# Patient Record
Sex: FEMALE | Race: WHITE | Employment: FULL TIME | URBAN - METROPOLITAN AREA
[De-identification: names, ages, dates, MRNs, and addresses within clinical notes are randomized per-mention and may not be internally consistent; named-entity substitution may affect disease eponyms.]

---

## 2017-01-23 ENCOUNTER — OFFICE VISIT (OUTPATIENT)
Dept: FAMILY MEDICINE CLINIC | Facility: CLINIC | Age: 47
End: 2017-01-23

## 2017-01-23 VITALS
HEIGHT: 64 IN | BODY MASS INDEX: 24.82 KG/M2 | DIASTOLIC BLOOD PRESSURE: 68 MMHG | SYSTOLIC BLOOD PRESSURE: 110 MMHG | WEIGHT: 145.38 LBS | HEART RATE: 88 BPM

## 2017-01-23 DIAGNOSIS — M72.2 PLANTAR FASCIITIS OF LEFT FOOT: Primary | ICD-10-CM

## 2017-01-23 PROCEDURE — 99213 OFFICE O/P EST LOW 20 MIN: CPT | Performed by: FAMILY MEDICINE

## 2017-01-23 NOTE — PATIENT INSTRUCTIONS
Understanding Plantar Fasciitis    Plantar fasciitis is a condition that causes foot and heel pain. The plantar fascia is a tough band of tissue that runs across the bottom of the foot from the heel to the toes. This tissue pulls on the heel bone.  It sup · Using heel cups or foot inserts (orthotics). These are placed in the shoes to help support the heel or arch and cushion the heel. You may also be told to buy proper-fitting shoes with good arch support and cushioned soles. · Taping the foot.  This suppor This can happen gradually or suddenly. It usually affects one foot at a time. Heel pain can be sharp, like a knife sticking into the bottom of your foot.  You may feel pain after exercising, long-distance jogging, stair climbing, long periods of standing, o · Icing may help control heel pain. Apply an ice pack to the heel for 10-20 minutes as a preventive. Or ice your heel after a severe flare-up of symptoms. You may repeat this every 1-2 hours as needed.   · You may use over-the-counter pain medicine to contr · To reduce severe pain and swelling, your healthcare provider may prescribe pills or injections or a walking cast in some instances. Physical therapy, such as ultrasound or a daily stretching program, may also be recommended. Surgery is rarely required.

## 2017-01-23 NOTE — PROGRESS NOTES
Britni Mckeon is a 48year old female. HPI:       Left heel pain. Started in the right, which got better with stretches. At worst is in the morning, up to 7-8/10. Also worse at the end of the day.   Worse after sitting for a while and getting up to wal 70% hearing loss in left ear   • History of HPV infection 6/2/2015   • Breast cancer ([facility] [address] 5/2014     Invasive ductal CA          Past Surgical History    TUBAL LIGATION  10-    Comment [facility] [place]    OTHER SURGICAL HISTORY oriented to person, place, and time. No sensory deficit. Skin: Skin is warm and dry. No lesion and no rash noted. No erythema. Psychiatric: She has a normal mood and affect.  Her behavior is normal.       ASSESSMENT AND PLAN:     Plantar fasciitis of le

## 2017-01-24 ENCOUNTER — HOSPITAL ENCOUNTER (OUTPATIENT)
Dept: GENERAL RADIOLOGY | Facility: HOSPITAL | Age: 47
Discharge: HOME | End: 2017-01-24
Attending: FAMILY MEDICINE
Payer: COMMERCIAL

## 2017-01-24 DIAGNOSIS — M72.2 PLANTAR FASCIITIS OF LEFT FOOT: ICD-10-CM

## 2017-01-24 PROCEDURE — 73630 X-RAY EXAM OF FOOT: CPT

## 2017-03-06 ENCOUNTER — OFFICE VISIT (OUTPATIENT)
Dept: FAMILY MEDICINE CLINIC | Facility: CLINIC | Age: 47
End: 2017-03-06

## 2017-03-06 VITALS
HEIGHT: 64 IN | BODY MASS INDEX: 24.69 KG/M2 | HEART RATE: 84 BPM | WEIGHT: 144.63 LBS | DIASTOLIC BLOOD PRESSURE: 64 MMHG | SYSTOLIC BLOOD PRESSURE: 108 MMHG

## 2017-03-06 DIAGNOSIS — Z00.00 ROUTINE GENERAL MEDICAL EXAMINATION AT A HEALTH CARE FACILITY: Primary | ICD-10-CM

## 2017-03-06 DIAGNOSIS — N92.6 IRREGULAR MENSTRUAL BLEEDING: ICD-10-CM

## 2017-03-06 DIAGNOSIS — Z00.00 LABORATORY EXAM ORDERED AS PART OF ROUTINE GENERAL MEDICAL EXAMINATION: ICD-10-CM

## 2017-03-06 DIAGNOSIS — Z11.1 SCREENING FOR TUBERCULOSIS: ICD-10-CM

## 2017-03-06 PROCEDURE — 99396 PREV VISIT EST AGE 40-64: CPT | Performed by: FAMILY MEDICINE

## 2017-03-06 PROCEDURE — 86580 TB INTRADERMAL TEST: CPT | Performed by: FAMILY MEDICINE

## 2017-03-06 NOTE — PROGRESS NOTES
HPI:   Brian Bautista is a 48year old female who presents for a complete physical exam.   Symptoms: denies discharge, itching, burning or dysuria, irregular, pt on tamoxifen.    Last PAP: 2015  Abnormal PAP: 2014 HPV positive  Sexually active: yes   Last child birth   • Normal delivery 40-     child birth   • Visual impairment      reading glasses   • Hearing impairment      70% hearing loss in left ear   • History of HPV infection 6/2/2015   • [facility] 5[facility]2014     Invasive ductal CA chest pain or tightness on exertion: no edema  VASCULAR: denies leg cramps  GI: denies abdominal pain, bowel movement changes, blood in stool  : denies urinary problems, vaginal discharge or discomfort,  periods regular no  MUSCULOSKELETAL: denies joint Routine health profile labs pending. Healthy diet and regular exercise discussed and encouraged. Pt to return to clinic right after her period for PAP ONLY. 2. Laboratory exam ordered as part of routine general medical examination  - LIPID PANEL;  Futu

## 2017-03-06 NOTE — PATIENT INSTRUCTIONS
SCHEDULING [facility] LAB APPOINTMENTS ONLINE    Lab appointments can now be scheduled online at [email or web address]    · Go to [email or web address]  · In Search type Lab  · Click \"Lab services\"  · Click \"Schedule Your Test Online\"  · Follow the prompts often you should have a mammogram.   Pap test: at least every 3 years if you have ever had sex and have not had your uterus removed.  Your healthcare provider may recommend more frequent screening if you have had any abnormalities in the past or if you have smoke or do not get regular exercise). Osteoporosis is a disease that thins and weakens bones to the point where they break easily. Hearing test: if you are 72 or older   Vision test: if you are 72 or older.    Remember, these are the minimum recommendati a hepatitis shot and for all adults who are at risk of infection.  This includes, for example, women who have more than 1 sex partner or whose partner has more than 1 partner, or who have a sexually transmitted infection, abuse IV drugs, or plan to travel w partners. Hormone use: During or after menopause, discuss the risks and benefits of use of estrogen and progesterone replacement with your healthcare provider.    Osteoporosis prevention:  Advise 1,500 mg of calcium with 1,000 iu of vitamin D daily and da

## 2017-03-08 ENCOUNTER — NURSE ONLY (OUTPATIENT)
Dept: FAMILY MEDICINE CLINIC | Facility: CLINIC | Age: 47
End: 2017-03-08

## 2017-03-08 LAB — INDURATION (): 0 MM (ref 0–11)

## 2017-05-08 ENCOUNTER — OFFICE VISIT (OUTPATIENT)
Dept: HEMATOLOGY/ONCOLOGY | Age: 47
End: 2017-05-08
Attending: INTERNAL MEDICINE
Payer: COMMERCIAL

## 2017-05-08 VITALS
RESPIRATION RATE: 18 BRPM | SYSTOLIC BLOOD PRESSURE: 108 MMHG | OXYGEN SATURATION: 97 % | BODY MASS INDEX: 26 KG/M2 | HEART RATE: 75 BPM | WEIGHT: 150 LBS | DIASTOLIC BLOOD PRESSURE: 70 MMHG | TEMPERATURE: 99 F

## 2017-05-08 DIAGNOSIS — R74.8 ELEVATED LIVER ENZYMES: ICD-10-CM

## 2017-05-08 DIAGNOSIS — N92.1 MENORRHAGIA WITH IRREGULAR CYCLE: ICD-10-CM

## 2017-05-08 DIAGNOSIS — C50.412 PRIMARY MALIGNANT NEOPLASM OF UPPER OUTER QUADRANT OF LEFT FEMALE BREAST (HCC): Primary | ICD-10-CM

## 2017-05-08 PROCEDURE — 99213 OFFICE O/P EST LOW 20 MIN: CPT | Performed by: INTERNAL MEDICINE

## 2017-05-08 RX ORDER — ALPRAZOLAM 1 MG/1
1 TABLET ORAL 2 TIMES DAILY PRN
Qty: 60 TABLET | Refills: 6 | Status: SHIPPED | OUTPATIENT
Start: 2017-05-08 | End: 2017-11-13

## 2017-05-08 NOTE — PROGRESS NOTES
[facility] Progress Note      Patient Name:  Lynn Berry  YOB: 1970  Medical Record Number:  [id number]    Date of visit:  5/8/2017    CHIEF COMPLAINT:  Stage I  L breast carcinoma s/p lumpectomy.     HPI:      48year old pre- Prescriptions:  ALPRAZolam (XANAX) 1 MG Oral Tab Take 1 tablet (1 mg total) by mouth 2 (two) times daily as needed for Sleep. Disp: 60 tablet Rfl: 6   Venlafaxine HCl ER 75 MG Oral Capsule SR 24 Hr Take 1 capsule (75 mg total) by mouth once daily.  Disp: 30 healed lumpectomy scars. No axillary adenopathy was noted. Emotional well being: Patient's emotional well being was assessed. No issues requiring acute psychosocial intervention were identified.      LABS:     No results found for this or any previous

## 2017-05-08 NOTE — PROGRESS NOTES
Pt here for MD f/u visit for h/o breast ca. Continues on Tamoxifen daily. Does report having hot flashes. Denies joint stiffness/pain.      Education Record    Learner:  Patient    Disease / Diagnosis:breast ca    Barriers / Limitations:  None   Comments:

## 2017-06-19 ENCOUNTER — LAB ENCOUNTER (OUTPATIENT)
Dept: LAB | Age: 47
End: 2017-06-19
Attending: FAMILY MEDICINE
Payer: COMMERCIAL

## 2017-06-19 DIAGNOSIS — Z00.00 LABORATORY EXAM ORDERED AS PART OF ROUTINE GENERAL MEDICAL EXAMINATION: ICD-10-CM

## 2017-06-19 DIAGNOSIS — C50.412 PRIMARY MALIGNANT NEOPLASM OF UPPER OUTER QUADRANT OF LEFT FEMALE BREAST (HCC): ICD-10-CM

## 2017-06-19 DIAGNOSIS — R74.8 ELEVATED LIVER ENZYMES: ICD-10-CM

## 2017-06-19 DIAGNOSIS — N92.1 MENORRHAGIA WITH IRREGULAR CYCLE: ICD-10-CM

## 2017-06-19 PROCEDURE — 82306 VITAMIN D 25 HYDROXY: CPT | Performed by: FAMILY MEDICINE

## 2017-06-19 PROCEDURE — 36415 COLL VENOUS BLD VENIPUNCTURE: CPT | Performed by: FAMILY MEDICINE

## 2017-06-19 PROCEDURE — 84439 ASSAY OF FREE THYROXINE: CPT | Performed by: FAMILY MEDICINE

## 2017-06-19 PROCEDURE — 84443 ASSAY THYROID STIM HORMONE: CPT | Performed by: FAMILY MEDICINE

## 2017-06-19 PROCEDURE — 80061 LIPID PANEL: CPT | Performed by: FAMILY MEDICINE

## 2017-06-22 ENCOUNTER — TELEPHONE (OUTPATIENT)
Dept: HEMATOLOGY/ONCOLOGY | Facility: HOSPITAL | Age: 47
End: 2017-06-22

## 2017-06-22 NOTE — TELEPHONE ENCOUNTER
\"Call, labs ok except liver enzyme bit high-fatuma low fat diet and recheck in 3 month-ordered. Hb bit low-no intervention needed. is she still having irregular spotting/menses? \"    Called to patient. Will try low fat diet and recheck in 3 months.   Per pa

## 2017-09-08 ENCOUNTER — HOSPITAL ENCOUNTER (EMERGENCY)
Age: 47
Discharge: HOME | End: 2017-09-08
Attending: EMERGENCY MEDICINE
Payer: COMMERCIAL

## 2017-09-08 ENCOUNTER — APPOINTMENT (OUTPATIENT)
Dept: GENERAL RADIOLOGY | Age: 47
End: 2017-09-08
Attending: EMERGENCY MEDICINE
Payer: COMMERCIAL

## 2017-09-08 ENCOUNTER — OFFICE VISIT (OUTPATIENT)
Dept: FAMILY MEDICINE CLINIC | Facility: CLINIC | Age: 47
End: 2017-09-08

## 2017-09-08 VITALS
OXYGEN SATURATION: 98 % | SYSTOLIC BLOOD PRESSURE: 126 MMHG | RESPIRATION RATE: 16 BRPM | WEIGHT: 148 LBS | HEART RATE: 95 BPM | BODY MASS INDEX: 25 KG/M2 | TEMPERATURE: 99 F | DIASTOLIC BLOOD PRESSURE: 82 MMHG

## 2017-09-08 VITALS
OXYGEN SATURATION: 99 % | SYSTOLIC BLOOD PRESSURE: 127 MMHG | WEIGHT: 148 LBS | HEART RATE: 99 BPM | TEMPERATURE: 99 F | BODY MASS INDEX: 27.23 KG/M2 | HEIGHT: 62 IN | RESPIRATION RATE: 18 BRPM | DIASTOLIC BLOOD PRESSURE: 84 MMHG

## 2017-09-08 DIAGNOSIS — Z02.9 ENCOUNTERS FOR ADMINISTRATIVE PURPOSES: Primary | ICD-10-CM

## 2017-09-08 DIAGNOSIS — R07.89 CHEST PAIN, ATYPICAL: Primary | ICD-10-CM

## 2017-09-08 LAB
ALBUMIN SERPL-MCNC: 3.7 G/DL (ref 3.5–4.8)
ALP LIVER SERPL-CCNC: 68 U/L (ref 39–100)
ALT SERPL-CCNC: 35 U/L (ref 14–54)
APTT PPP: 23.4 SECONDS (ref 25–34)
AST SERPL-CCNC: 24 U/L (ref 15–41)
BASOPHILS # BLD AUTO: 0.04 X10(3) UL (ref 0–0.1)
BASOPHILS NFR BLD AUTO: 0.3 %
BILIRUB SERPL-MCNC: 0.4 MG/DL (ref 0.1–2)
BILIRUB UR QL STRIP.AUTO: NEGATIVE
BUN BLD-MCNC: 14 MG/DL (ref 8–20)
CALCIUM BLD-MCNC: 9.1 MG/DL (ref 8.3–10.3)
CHLORIDE: 102 MMOL/L (ref 101–111)
CLARITY UR REFRACT.AUTO: CLEAR
CO2: 26 MMOL/L (ref 22–32)
COLOR UR AUTO: YELLOW
CREAT BLD-MCNC: 0.8 MG/DL (ref 0.55–1.02)
D-DIMER: 0.36 UG/ML FEU (ref 0–0.49)
EOSINOPHIL # BLD AUTO: 0.02 X10(3) UL (ref 0–0.3)
EOSINOPHIL NFR BLD AUTO: 0.1 %
ERYTHROCYTE [DISTWIDTH] IN BLOOD BY AUTOMATED COUNT: 11.7 % (ref 11.5–16)
GLUCOSE BLD-MCNC: 86 MG/DL (ref 70–99)
GLUCOSE UR STRIP.AUTO-MCNC: NEGATIVE MG/DL
HCT VFR BLD AUTO: 37.3 % (ref 34–50)
HGB BLD-MCNC: 13.1 G/DL (ref 12–16)
IMMATURE GRANULOCYTE COUNT: 0.07 X10(3) UL (ref 0–1)
IMMATURE GRANULOCYTE RATIO %: 0.5 %
INR BLD: 0.92 (ref 0.89–1.12)
KETONES UR STRIP.AUTO-MCNC: NEGATIVE MG/DL
LEUKOCYTE ESTERASE UR QL STRIP.AUTO: NEGATIVE
LYMPHOCYTES # BLD AUTO: 1.58 X10(3) UL (ref 0.9–4)
LYMPHOCYTES NFR BLD AUTO: 10.6 %
M PROTEIN MFR SERPL ELPH: 7.4 G/DL (ref 6.1–8.3)
MCH RBC QN AUTO: 31.3 PG (ref 27–33.2)
MCHC RBC AUTO-ENTMCNC: 35.1 G/DL (ref 31–37)
MCV RBC AUTO: 89.2 FL (ref 81–100)
MONOCYTES # BLD AUTO: 0.85 X10(3) UL (ref 0.1–0.6)
MONOCYTES NFR BLD AUTO: 5.7 %
NEUTROPHIL ABS PRELIM: 12.41 X10 (3) UL (ref 1.3–6.7)
NEUTROPHILS # BLD AUTO: 12.41 X10(3) UL (ref 1.3–6.7)
NEUTROPHILS NFR BLD AUTO: 82.8 %
NITRITE UR QL STRIP.AUTO: NEGATIVE
PH UR STRIP.AUTO: 6 [PH] (ref 4.5–8)
PLATELET # BLD AUTO: 275 10(3)UL (ref 150–450)
POTASSIUM SERPL-SCNC: 3.6 MMOL/L (ref 3.6–5.1)
PROT UR STRIP.AUTO-MCNC: NEGATIVE MG/DL
PSA SERPL DL<=0.01 NG/ML-MCNC: 12 SECONDS (ref 11.8–14.1)
RBC # BLD AUTO: 4.18 X10(6)UL (ref 3.8–5.1)
RBC UR QL AUTO: NEGATIVE
RED CELL DISTRIBUTION WIDTH-SD: 37.4 FL (ref 35.1–46.3)
SODIUM SERPL-SCNC: 135 MMOL/L (ref 136–144)
SP GR UR STRIP.AUTO: 1.01 (ref 1–1.03)
TROPONIN: <0.046 NG/ML (ref ?–0.05)
TROPONIN: <0.046 NG/ML (ref ?–0.05)
UROBILINOGEN UR STRIP.AUTO-MCNC: 0.2 MG/DL
WBC # BLD AUTO: 15 X10(3) UL (ref 4–13)

## 2017-09-08 PROCEDURE — 85730 THROMBOPLASTIN TIME PARTIAL: CPT | Performed by: EMERGENCY MEDICINE

## 2017-09-08 PROCEDURE — 85378 FIBRIN DEGRADE SEMIQUANT: CPT | Performed by: EMERGENCY MEDICINE

## 2017-09-08 PROCEDURE — 93005 ELECTROCARDIOGRAM TRACING: CPT

## 2017-09-08 PROCEDURE — 71100 X-RAY EXAM RIBS UNI 2 VIEWS: CPT | Performed by: EMERGENCY MEDICINE

## 2017-09-08 PROCEDURE — 36415 COLL VENOUS BLD VENIPUNCTURE: CPT

## 2017-09-08 PROCEDURE — 85025 COMPLETE CBC W/AUTO DIFF WBC: CPT | Performed by: EMERGENCY MEDICINE

## 2017-09-08 PROCEDURE — 80053 COMPREHEN METABOLIC PANEL: CPT | Performed by: EMERGENCY MEDICINE

## 2017-09-08 PROCEDURE — 85610 PROTHROMBIN TIME: CPT | Performed by: EMERGENCY MEDICINE

## 2017-09-08 PROCEDURE — 71020 XR CHEST PA + LAT CHEST (CPT=71020): CPT | Performed by: EMERGENCY MEDICINE

## 2017-09-08 PROCEDURE — 99285 EMERGENCY DEPT VISIT HI MDM: CPT

## 2017-09-08 PROCEDURE — 81003 URINALYSIS AUTO W/O SCOPE: CPT | Performed by: EMERGENCY MEDICINE

## 2017-09-08 PROCEDURE — 84484 ASSAY OF TROPONIN QUANT: CPT | Performed by: EMERGENCY MEDICINE

## 2017-09-08 RX ORDER — ASPIRIN 81 MG/1
324 TABLET, CHEWABLE ORAL ONCE
Status: COMPLETED | OUTPATIENT
Start: 2017-09-08 | End: 2017-09-08

## 2017-09-08 NOTE — ED INITIAL ASSESSMENT (HPI)
Pt has had chest pain a couple times lasting 4-5 hours for the past couple days. Pt states she is having pressure pain. Pt states she woke up at night w pain. Pt states the pain is mid chest epigastric region.  Pt has taken tums and relieved the pain on e t

## 2017-09-08 NOTE — PROGRESS NOTES
S:    Patient reports substernal chest pain described as squeezing. Pain started last night. Last night and this morning, pain rated 10/10. Currently, pain rated 4-5/10. Patient has had 3 previous episodes, but never sought medical care.    No SOB, dizz

## 2017-09-08 NOTE — ED PROVIDER NOTES
Patient Seen in: [address] [facility] In [place]    History   Patient presents with:  Chest Pain Angina (cardiovascular)    Stated Complaint: chest pain    HPI    Patient is a 41-year-old female who presents emergency room with a history of sev History   Problem Relation Age of Onset   • Cancer Paternal Grandmother      breast   • Breast Cancer Paternal Grandmother 72   • Diabetes Maternal Grandmother    • Breast Cancer Maternal Grandmother 72     estimated age   • Glaucoma Mother    • Breast Can appreciated. There is normoactive bowel sounds. There is no hernia. EXTREMITIES: There is no cyanosis, clubbing, or edema appreciated. Pulses are 2+ and equal in all 4 extremities. NEURO: Patient is awake, alert and oriented to time place and person.  Mot presentation. PROTHROMBIN TIME (PT) - Normal   TROPONIN I - Normal   CBC WITH DIFFERENTIAL WITH PLATELET    Narrative: The following orders were created for panel order CBC WITH DIFFERENTIAL WITH PLATELET.   Procedure                               Abn cardiac monitor.   I discussed with the patient at this point that she needed further testing and suggested admission to the hospital for further blood tests and a stress echocardiogram.  The patient is declining admission to the hospital and is aware of th

## 2017-09-09 LAB
ATRIAL RATE: 100 BPM
P AXIS: 43 DEGREES
P-R INTERVAL: 124 MS
Q-T INTERVAL: 310 MS
QRS DURATION: 80 MS
QTC CALCULATION (BEZET): 399 MS
R AXIS: 31 DEGREES
T AXIS: -33 DEGREES
VENTRICULAR RATE: 100 BPM

## 2017-10-11 RX ORDER — TAMOXIFEN CITRATE 20 MG/1
TABLET ORAL
Qty: 30 TABLET | Refills: 8 | Status: SHIPPED | OUTPATIENT
Start: 2017-10-11 | End: 2018-05-26

## 2017-11-06 ENCOUNTER — APPOINTMENT (OUTPATIENT)
Dept: HEMATOLOGY/ONCOLOGY | Age: 47
End: 2017-11-06
Payer: COMMERCIAL

## 2017-11-11 DIAGNOSIS — R74.8 ELEVATED LIVER ENZYMES: ICD-10-CM

## 2017-11-11 DIAGNOSIS — C50.412 PRIMARY MALIGNANT NEOPLASM OF UPPER OUTER QUADRANT OF LEFT FEMALE BREAST (HCC): ICD-10-CM

## 2017-11-11 DIAGNOSIS — N92.1 MENORRHAGIA WITH IRREGULAR CYCLE: ICD-10-CM

## 2017-11-13 ENCOUNTER — OFFICE VISIT (OUTPATIENT)
Dept: HEMATOLOGY/ONCOLOGY | Age: 47
End: 2017-11-13
Attending: INTERNAL MEDICINE
Payer: COMMERCIAL

## 2017-11-13 VITALS
DIASTOLIC BLOOD PRESSURE: 72 MMHG | WEIGHT: 155.63 LBS | BODY MASS INDEX: 28 KG/M2 | HEART RATE: 90 BPM | RESPIRATION RATE: 18 BRPM | SYSTOLIC BLOOD PRESSURE: 111 MMHG | TEMPERATURE: 98 F | OXYGEN SATURATION: 98 %

## 2017-11-13 DIAGNOSIS — C50.412 PRIMARY MALIGNANT NEOPLASM OF UPPER OUTER QUADRANT OF LEFT FEMALE BREAST (HCC): Primary | ICD-10-CM

## 2017-11-13 DIAGNOSIS — N92.1 MENORRHAGIA WITH IRREGULAR CYCLE: ICD-10-CM

## 2017-11-13 DIAGNOSIS — R74.8 ELEVATED LIVER ENZYMES: ICD-10-CM

## 2017-11-13 PROCEDURE — 99213 OFFICE O/P EST LOW 20 MIN: CPT | Performed by: INTERNAL MEDICINE

## 2017-11-13 RX ORDER — VENLAFAXINE HYDROCHLORIDE 75 MG/1
CAPSULE, EXTENDED RELEASE ORAL
Qty: 30 CAPSULE | Refills: 8 | Status: SHIPPED | OUTPATIENT
Start: 2017-11-13 | End: 2018-02-28

## 2017-11-13 RX ORDER — ALPRAZOLAM 1 MG/1
1 TABLET ORAL 2 TIMES DAILY PRN
Qty: 60 TABLET | Refills: 6 | Status: SHIPPED | OUTPATIENT
Start: 2017-11-13 | End: 2018-05-14

## 2017-11-13 NOTE — PROGRESS NOTES
Pt here for 6mth MD f/u visit for h/o breast ca. Continues on Tamoxifen daily. Does report having hot flashes.  Denies joint stiffness/pain.      Education Record     Learner:  Patient     Disease / Alfredo preston     Barriers / Limitations:  None

## 2017-11-13 NOTE — PROGRESS NOTES
[facility] Progress Note      Patient Name:  German Mcclure  YOB: 1970  Medical Record Number:  [id number]    Date of visit:  11/13/2017    CHIEF COMPLAINT:  Stage I  L breast carcinoma s/p lumpectomy.     HPI:      50year old pr Heme: no easy bruising or bleeding     ALLERGIES:    Tegaderm Chg Dressi*    Rash    Comment:Reacts to adhesive    MEDICATIONS:      Current Outpatient Prescriptions:  VENLAFAXINE HCL ER 75 MG Oral Capsule SR 24 Hr TAKE 1 CAPSULE BY MOUTH DAILY Disp: 30 hepato-splenomegaly. Extremities:  No edema. CNS: no focal deficit  Breast: both breasts were soft, without any masses or nipple retraction, with well healed lumpectomy scars. No axillary adenopathy was noted.       Emotional well being: Patient's emotion

## 2017-12-20 ENCOUNTER — HOSPITAL ENCOUNTER (OUTPATIENT)
Dept: MAMMOGRAPHY | Age: 47
Discharge: HOME | End: 2017-12-20
Attending: INTERNAL MEDICINE
Payer: COMMERCIAL

## 2017-12-20 DIAGNOSIS — C50.412 PRIMARY MALIGNANT NEOPLASM OF UPPER OUTER QUADRANT OF LEFT FEMALE BREAST (HCC): ICD-10-CM

## 2017-12-20 PROCEDURE — 77062 BREAST TOMOSYNTHESIS BI: CPT | Performed by: INTERNAL MEDICINE

## 2017-12-20 PROCEDURE — 77066 DX MAMMO INCL CAD BI: CPT | Performed by: INTERNAL MEDICINE

## 2018-02-12 ENCOUNTER — OFFICE VISIT (OUTPATIENT)
Dept: FAMILY MEDICINE CLINIC | Facility: CLINIC | Age: 48
End: 2018-02-12

## 2018-02-12 VITALS
WEIGHT: 163.63 LBS | HEIGHT: 64 IN | DIASTOLIC BLOOD PRESSURE: 72 MMHG | HEART RATE: 80 BPM | BODY MASS INDEX: 27.93 KG/M2 | RESPIRATION RATE: 16 BRPM | SYSTOLIC BLOOD PRESSURE: 104 MMHG

## 2018-02-12 DIAGNOSIS — E66.3 OVERWEIGHT (BMI 25.0-29.9): ICD-10-CM

## 2018-02-12 DIAGNOSIS — K75.81 NASH (NONALCOHOLIC STEATOHEPATITIS): ICD-10-CM

## 2018-02-12 DIAGNOSIS — Z87.19 HISTORY OF CHOLELITHIASIS: ICD-10-CM

## 2018-02-12 DIAGNOSIS — R10.11 RIGHT UPPER QUADRANT ABDOMINAL PAIN: ICD-10-CM

## 2018-02-12 DIAGNOSIS — R07.89 OTHER CHEST PAIN: Primary | ICD-10-CM

## 2018-02-12 PROCEDURE — 99214 OFFICE O/P EST MOD 30 MIN: CPT | Performed by: FAMILY MEDICINE

## 2018-02-12 NOTE — PROGRESS NOTES
Bill Centeno is a 50year old female. HPI:       Since July 2017 has been having episodes of right upper quadrant or right lower chest pain.   One episode so severe it woke her out of her sleep with squeezing pain of the very upper abdomen/lower chest. (OMEGA 3-6-9 COMPLEX) Oral Cap Take 2 capsules by mouth daily. Disp:  Rfl:    B Complex Vitamins (B COMPLEX OR) Take  by mouth. Disp:  Rfl:    Cyanocobalamin (VITAMIN B-12) 5000 MCG Sublingual SL Tab Place 1 tablet under the tongue daily.  Disp:  Rfl:    Co LEFT  43-: TUBAL LIGATION      Comment: Karishma Peres   Social History:  Smoking status: Former Smoker                                                              Packs/day: 1.00      Years: 15.00        Quit date: 3/1/2003  Smok oriented to person, place, and time. Psychiatric: She has a normal mood and affect. Her behavior is normal.         DATA:    Interpretation   PROCEDURE:  COMPLETE ULTRASOUND OF THE ABDOMEN WITHOUT DOPPLER     COMPARISON:  None.      INDICATIONS:  R74.8 Ab (CPT=76700); Future  - LIPASE; Future    4. SOTO (nonalcoholic steatohepatitis)  Slow steady weight loss recommended. Check labs. - US ABDOMEN COMPLETE (CPT=76700); Future  - COMP METABOLIC PANEL (14); Future    5. Overweight (BMI 25.0-29. 9)  Slow stea

## 2018-02-12 NOTE — PATIENT INSTRUCTIONS
We will call you with the test results and the next step.         Possible Gallstone with Biliary Colic (Presumed)    Your abdominal pain is may be due to spasm of the gallbladder. This is called gallbladder or biliary colic. The gallbladder is a smal and may cause increased pain. Therefore, avoid fat in your diet over the next two days and follow a low-fat diet after that. If you are overweight, a low fat diet will help you lose weight.   Follow-up care  If a test was already scheduled for you, keep thi

## 2018-02-19 ENCOUNTER — LAB ENCOUNTER (OUTPATIENT)
Dept: LAB | Age: 48
End: 2018-02-19
Attending: FAMILY MEDICINE
Payer: COMMERCIAL

## 2018-02-19 DIAGNOSIS — Z87.19 HISTORY OF CHOLELITHIASIS: ICD-10-CM

## 2018-02-19 DIAGNOSIS — R10.11 RIGHT UPPER QUADRANT ABDOMINAL PAIN: ICD-10-CM

## 2018-02-19 DIAGNOSIS — K75.81 NASH (NONALCOHOLIC STEATOHEPATITIS): ICD-10-CM

## 2018-02-19 LAB
ALBUMIN SERPL-MCNC: 3.5 G/DL (ref 3.5–4.8)
ALP LIVER SERPL-CCNC: 66 U/L (ref 39–100)
ALT SERPL-CCNC: 34 U/L (ref 14–54)
AST SERPL-CCNC: 22 U/L (ref 15–41)
BASOPHILS # BLD AUTO: 0.05 X10(3) UL (ref 0–0.1)
BASOPHILS NFR BLD AUTO: 0.9 %
BILIRUB SERPL-MCNC: 0.4 MG/DL (ref 0.1–2)
BUN BLD-MCNC: 18 MG/DL (ref 8–20)
CALCIUM BLD-MCNC: 8.8 MG/DL (ref 8.3–10.3)
CHLORIDE: 111 MMOL/L (ref 101–111)
CO2: 23 MMOL/L (ref 22–32)
CREAT BLD-MCNC: 0.94 MG/DL (ref 0.55–1.02)
EOSINOPHIL # BLD AUTO: 0.18 X10(3) UL (ref 0–0.3)
EOSINOPHIL NFR BLD AUTO: 3.3 %
ERYTHROCYTE [DISTWIDTH] IN BLOOD BY AUTOMATED COUNT: 12.8 % (ref 11.5–16)
GLUCOSE BLD-MCNC: 91 MG/DL (ref 70–99)
HCT VFR BLD AUTO: 35.4 % (ref 34–50)
HGB BLD-MCNC: 12.1 G/DL (ref 12–16)
IMMATURE GRANULOCYTE COUNT: 0.03 X10(3) UL (ref 0–1)
IMMATURE GRANULOCYTE RATIO %: 0.6 %
LIPASE: 219 U/L (ref 73–393)
LYMPHOCYTES # BLD AUTO: 1.52 X10(3) UL (ref 0.9–4)
LYMPHOCYTES NFR BLD AUTO: 28.1 %
M PROTEIN MFR SERPL ELPH: 6.9 G/DL (ref 6.1–8.3)
MCH RBC QN AUTO: 30.8 PG (ref 27–33.2)
MCHC RBC AUTO-ENTMCNC: 34.2 G/DL (ref 31–37)
MCV RBC AUTO: 90.1 FL (ref 81–100)
MONOCYTES # BLD AUTO: 0.54 X10(3) UL (ref 0.1–1)
MONOCYTES NFR BLD AUTO: 10 %
NEUTROPHIL ABS PRELIM: 3.09 X10 (3) UL (ref 1.3–6.7)
NEUTROPHILS # BLD AUTO: 3.09 X10(3) UL (ref 1.3–6.7)
NEUTROPHILS NFR BLD AUTO: 57.1 %
PLATELET # BLD AUTO: 275 10(3)UL (ref 150–450)
POTASSIUM SERPL-SCNC: 4.3 MMOL/L (ref 3.6–5.1)
RBC # BLD AUTO: 3.93 X10(6)UL (ref 3.8–5.1)
RED CELL DISTRIBUTION WIDTH-SD: 42.2 FL (ref 35.1–46.3)
SODIUM SERPL-SCNC: 141 MMOL/L (ref 136–144)
WBC # BLD AUTO: 5.4 X10(3) UL (ref 4–13)

## 2018-02-19 PROCEDURE — 36415 COLL VENOUS BLD VENIPUNCTURE: CPT

## 2018-02-19 PROCEDURE — 83690 ASSAY OF LIPASE: CPT

## 2018-02-19 PROCEDURE — 85025 COMPLETE CBC W/AUTO DIFF WBC: CPT

## 2018-02-19 PROCEDURE — 80053 COMPREHEN METABOLIC PANEL: CPT

## 2018-02-26 ENCOUNTER — HOSPITAL ENCOUNTER (OUTPATIENT)
Dept: ULTRASOUND IMAGING | Age: 48
Discharge: HOME | End: 2018-02-26
Attending: FAMILY MEDICINE
Payer: COMMERCIAL

## 2018-02-26 DIAGNOSIS — K75.81 NASH (NONALCOHOLIC STEATOHEPATITIS): ICD-10-CM

## 2018-02-26 DIAGNOSIS — Z87.19 HISTORY OF CHOLELITHIASIS: ICD-10-CM

## 2018-02-26 DIAGNOSIS — R10.11 RIGHT UPPER QUADRANT ABDOMINAL PAIN: ICD-10-CM

## 2018-02-26 PROCEDURE — 76700 US EXAM ABDOM COMPLETE: CPT | Performed by: FAMILY MEDICINE

## 2018-02-28 DIAGNOSIS — C50.412 PRIMARY MALIGNANT NEOPLASM OF UPPER OUTER QUADRANT OF LEFT FEMALE BREAST (HCC): ICD-10-CM

## 2018-02-28 DIAGNOSIS — N92.1 MENORRHAGIA WITH IRREGULAR CYCLE: ICD-10-CM

## 2018-02-28 DIAGNOSIS — R74.8 ELEVATED LIVER ENZYMES: ICD-10-CM

## 2018-02-28 RX ORDER — VENLAFAXINE HYDROCHLORIDE 75 MG/1
75 CAPSULE, EXTENDED RELEASE ORAL
Qty: 90 CAPSULE | Refills: 3 | Status: SHIPPED | OUTPATIENT
Start: 2018-02-28 | End: 2019-07-15

## 2018-03-05 ENCOUNTER — TELEPHONE (OUTPATIENT)
Dept: FAMILY MEDICINE CLINIC | Facility: CLINIC | Age: 48
End: 2018-03-05

## 2018-03-05 DIAGNOSIS — K80.20 CALCULUS OF GALLBLADDER WITHOUT CHOLECYSTITIS WITHOUT OBSTRUCTION: Primary | ICD-10-CM

## 2018-03-05 NOTE — TELEPHONE ENCOUNTER
Please call patient regarding the ultrasound:  Gallbladder stone that was noted on previous exam is now a little bit bigger.   Please provide patient contact information to Dr. Deborah Bence office, she can see Dr. Annika Jones or any of his colleagues, referral

## 2018-03-05 NOTE — TELEPHONE ENCOUNTER
Written by Reynold Contreras DO on 2/20/2018 12:07 AM   Robby Cruz, your blood test results are normal.   Sincerely,   Dr. Koko Ventura to patient with her lab results as above but she is now asking for the U/S results as well.     thanks

## 2018-03-06 NOTE — TELEPHONE ENCOUNTER
lmom for pt with results/instructions. Left the contact info for Dr. Yonis Amor [phone]. Asked pt after reviewing message to call office with any questions.

## 2018-03-20 ENCOUNTER — OFFICE VISIT (OUTPATIENT)
Dept: SURGERY | Facility: CLINIC | Age: 48
End: 2018-03-20

## 2018-03-20 VITALS
HEART RATE: 100 BPM | BODY MASS INDEX: 30 KG/M2 | TEMPERATURE: 99 F | DIASTOLIC BLOOD PRESSURE: 84 MMHG | SYSTOLIC BLOOD PRESSURE: 110 MMHG | HEIGHT: 62 IN | WEIGHT: 163 LBS

## 2018-03-20 DIAGNOSIS — K42.0 INCARCERATED UMBILICAL HERNIA: ICD-10-CM

## 2018-03-20 DIAGNOSIS — K80.12 CALCULUS OF GALLBLADDER WITH ACUTE ON CHRONIC CHOLECYSTITIS WITHOUT OBSTRUCTION: Primary | ICD-10-CM

## 2018-03-20 DIAGNOSIS — R74.8 ELEVATED LIVER ENZYMES: ICD-10-CM

## 2018-03-20 DIAGNOSIS — E66.3 OVERWEIGHT (BMI 25.0-29.9): ICD-10-CM

## 2018-03-20 PROCEDURE — 99244 OFF/OP CNSLTJ NEW/EST MOD 40: CPT | Performed by: COLON & RECTAL SURGERY

## 2018-03-20 NOTE — H&P
New Patient Visit Note       Active Problems      1. Calculus of gallbladder with acute on chronic cholecystitis without obstruction    2. Incarcerated umbilical hernia    3. Elevated liver enzymes    4. Overweight (BMI 25.0-29. 9)        Chief Complaint COMPLETE (CPT=76700), 12/02/2015, 10:41.      INDICATIONS:  R10.11 Right upper quadrant pain B87.90 Nonalcoholic steatohepatitis (SOTO) Z87.19 Personal history of other diseases of the digestive system     TECHNIQUE:  Real time gray-scale ultrasound was use no   Have you ever been diagnosed with a stomach ulcer? no   Have you ever been diagnosed with angina? no   Have you ever been diagnosed with esophageal reflux? no   Do you have dark colored ([employer]) urine? no   Do you have micaela colored (light/whitish) History   Problem Relation Age of Onset   • Cancer Paternal Grandmother      breast   • Breast Cancer Paternal Grandmother 72   • Diabetes Maternal Grandmother    • Breast Cancer Maternal Grandmother 72     estimated age   • Glaucoma Mother    • Breast Can COMPLEX) Oral Cap Take 2 capsules by mouth daily. Disp:  Rfl:    B Complex Vitamins (B COMPLEX OR) Take  by mouth. Disp:  Rfl:    Cyanocobalamin (VITAMIN B-12) 5000 MCG Sublingual SL Tab Place 1 tablet under the tongue daily.  Disp:  Rfl:    Coenzyme Q10 (C normal, S2 normal and normal heart sounds. No murmur heard. Pulmonary/Chest: Effort normal and breath sounds normal. No accessory muscle usage. No tachypnea. No respiratory distress. She has no decreased breath sounds. She has no wheezes.  She has no rh chronic cholecystitis without obstruction  (primary encounter diagnosis)  Incarcerated umbilical hernia  Elevated liver enzymes  Overweight (BMI 25.0-29. 9)      Plan   I am seeing this patient at the request the primary care service regarding biliary colic There are no inguinal hernias present. This patient has biliary colic with several episodes of attacks. She will require laparoscopic cholecystectomy with cholangiogram.  We will fix her umbilical hernia at the time of her operation.     All risks, be

## 2018-03-21 DIAGNOSIS — K42.9 UMBILICAL HERNIA WITHOUT OBSTRUCTION AND WITHOUT GANGRENE: Primary | ICD-10-CM

## 2018-03-21 RX ORDER — BIOTIN 5 MG
TABLET ORAL
COMMUNITY
End: 2020-02-14

## 2018-03-21 NOTE — PATIENT INSTRUCTIONS
I am seeing this patient at the request the primary care service regarding biliary colic. This patient has abdominal pain. Her last attack was in February 2018. Her first symptoms that she reports were from July 2017.     She feels that she has had 6 t cholangiogram.  We will fix her umbilical hernia at the time of her operation. All risks, benefits, complications and alternatives to the proposed operation were fully discussed with the patient. All questions from the patient were answered in detail.  A

## 2018-03-23 ENCOUNTER — TELEPHONE (OUTPATIENT)
Dept: SURGERY | Facility: CLINIC | Age: 48
End: 2018-03-23

## 2018-03-27 ENCOUNTER — ANESTHESIA EVENT (OUTPATIENT)
Dept: SURGERY | Facility: HOSPITAL | Age: 48
End: 2018-03-27
Payer: COMMERCIAL

## 2018-03-28 ENCOUNTER — SURGERY (OUTPATIENT)
Age: 48
End: 2018-03-28

## 2018-03-28 ENCOUNTER — APPOINTMENT (OUTPATIENT)
Dept: GENERAL RADIOLOGY | Facility: HOSPITAL | Age: 48
End: 2018-03-28
Attending: COLON & RECTAL SURGERY
Payer: COMMERCIAL

## 2018-03-28 ENCOUNTER — HOSPITAL ENCOUNTER (OUTPATIENT)
Facility: HOSPITAL | Age: 48
Setting detail: HOSPITAL OUTPATIENT SURGERY
Discharge: HOME | End: 2018-03-28
Attending: COLON & RECTAL SURGERY | Admitting: COLON & RECTAL SURGERY
Payer: COMMERCIAL

## 2018-03-28 ENCOUNTER — ANESTHESIA (OUTPATIENT)
Dept: SURGERY | Facility: HOSPITAL | Age: 48
End: 2018-03-28
Payer: COMMERCIAL

## 2018-03-28 VITALS
TEMPERATURE: 98 F | HEART RATE: 92 BPM | RESPIRATION RATE: 16 BRPM | BODY MASS INDEX: 29.81 KG/M2 | OXYGEN SATURATION: 98 % | SYSTOLIC BLOOD PRESSURE: 123 MMHG | WEIGHT: 162 LBS | HEIGHT: 62 IN | DIASTOLIC BLOOD PRESSURE: 79 MMHG

## 2018-03-28 DIAGNOSIS — K80.12 CALCULUS OF GALLBLADDER WITH ACUTE ON CHRONIC CHOLECYSTITIS WITHOUT OBSTRUCTION: ICD-10-CM

## 2018-03-28 DIAGNOSIS — E66.3 OVERWEIGHT (BMI 25.0-29.9): ICD-10-CM

## 2018-03-28 DIAGNOSIS — K80.20 CHOLELITHIASES: ICD-10-CM

## 2018-03-28 DIAGNOSIS — R74.8 ELEVATED LIVER ENZYMES: ICD-10-CM

## 2018-03-28 DIAGNOSIS — K43.9 VENTRAL HERNIA WITHOUT OBSTRUCTION OR GANGRENE: ICD-10-CM

## 2018-03-28 DIAGNOSIS — K42.0 INCARCERATED UMBILICAL HERNIA: ICD-10-CM

## 2018-03-28 PROCEDURE — 88304 TISSUE EXAM BY PATHOLOGIST: CPT | Performed by: COLON & RECTAL SURGERY

## 2018-03-28 PROCEDURE — BF100ZZ FLUOROSCOPY OF BILE DUCTS USING HIGH OSMOLAR CONTRAST: ICD-10-PCS | Performed by: COLON & RECTAL SURGERY

## 2018-03-28 PROCEDURE — 74300 X-RAY BILE DUCTS/PANCREAS: CPT | Performed by: COLON & RECTAL SURGERY

## 2018-03-28 PROCEDURE — 0FT44ZZ RESECTION OF GALLBLADDER, PERCUTANEOUS ENDOSCOPIC APPROACH: ICD-10-PCS | Performed by: COLON & RECTAL SURGERY

## 2018-03-28 PROCEDURE — 81025 URINE PREGNANCY TEST: CPT | Performed by: COLON & RECTAL SURGERY

## 2018-03-28 PROCEDURE — 0WQF0ZZ REPAIR ABDOMINAL WALL, OPEN APPROACH: ICD-10-PCS | Performed by: COLON & RECTAL SURGERY

## 2018-03-28 RX ORDER — MORPHINE SULFATE 4 MG/ML
2 INJECTION, SOLUTION INTRAMUSCULAR; INTRAVENOUS EVERY 5 MIN PRN
Status: DISCONTINUED | OUTPATIENT
Start: 2018-03-28 | End: 2018-03-28

## 2018-03-28 RX ORDER — MORPHINE SULFATE 4 MG/ML
INJECTION, SOLUTION INTRAMUSCULAR; INTRAVENOUS
Status: COMPLETED
Start: 2018-03-28 | End: 2018-03-28

## 2018-03-28 RX ORDER — NALOXONE HYDROCHLORIDE 0.4 MG/ML
80 INJECTION, SOLUTION INTRAMUSCULAR; INTRAVENOUS; SUBCUTANEOUS AS NEEDED
Status: DISCONTINUED | OUTPATIENT
Start: 2018-03-28 | End: 2018-03-28

## 2018-03-28 RX ORDER — HYDROCODONE BITARTRATE AND ACETAMINOPHEN 5; 325 MG/1; MG/1
1 TABLET ORAL AS NEEDED
Status: COMPLETED | OUTPATIENT
Start: 2018-03-28 | End: 2018-03-28

## 2018-03-28 RX ORDER — SODIUM CHLORIDE, SODIUM LACTATE, POTASSIUM CHLORIDE, CALCIUM CHLORIDE 600; 310; 30; 20 MG/100ML; MG/100ML; MG/100ML; MG/100ML
INJECTION, SOLUTION INTRAVENOUS CONTINUOUS
Status: DISCONTINUED | OUTPATIENT
Start: 2018-03-28 | End: 2018-03-28

## 2018-03-28 RX ORDER — HYDROCODONE BITARTRATE AND ACETAMINOPHEN 5; 325 MG/1; MG/1
2 TABLET ORAL AS NEEDED
Status: COMPLETED | OUTPATIENT
Start: 2018-03-28 | End: 2018-03-28

## 2018-03-28 RX ORDER — HYDROCODONE BITARTRATE AND ACETAMINOPHEN 5; 325 MG/1; MG/1
1-2 TABLET ORAL
Qty: 20 TABLET | Refills: 0 | Status: SHIPPED | OUTPATIENT
Start: 2018-03-28 | End: 2018-04-03 | Stop reason: ALTCHOICE

## 2018-03-28 RX ORDER — HEPARIN SODIUM 5000 [USP'U]/ML
5000 INJECTION, SOLUTION INTRAVENOUS; SUBCUTANEOUS ONCE
Status: COMPLETED | OUTPATIENT
Start: 2018-03-28 | End: 2018-03-28

## 2018-03-28 RX ORDER — ONDANSETRON 2 MG/ML
4 INJECTION INTRAMUSCULAR; INTRAVENOUS AS NEEDED
Status: DISCONTINUED | OUTPATIENT
Start: 2018-03-28 | End: 2018-03-28

## 2018-03-28 RX ORDER — MEPERIDINE HYDROCHLORIDE 25 MG/ML
12.5 INJECTION INTRAMUSCULAR; INTRAVENOUS; SUBCUTANEOUS AS NEEDED
Status: DISCONTINUED | OUTPATIENT
Start: 2018-03-28 | End: 2018-03-28

## 2018-03-28 RX ORDER — HYDROCODONE BITARTRATE AND ACETAMINOPHEN 5; 325 MG/1; MG/1
TABLET ORAL
Status: DISCONTINUED
Start: 2018-03-28 | End: 2018-03-28

## 2018-03-28 RX ORDER — MIDAZOLAM HYDROCHLORIDE 1 MG/ML
1 INJECTION INTRAMUSCULAR; INTRAVENOUS EVERY 5 MIN PRN
Status: DISCONTINUED | OUTPATIENT
Start: 2018-03-28 | End: 2018-03-28

## 2018-03-28 NOTE — OPERATIVE REPORT
[facility]   Operative Note    [facility] Location: OR   [facility] [id number] MRN [id number]   Admission Date 3/28/2018 Operation Date 3/28/2018   Attending Physician Radha Celestin MD Operating Physician Isaiah Jay MD     Pre-Operative Diagnosis: CHO stomach, duodenum, anterior surfaces of the intestine, omentum, and peritoneum are all within normal limits. The patient underwent an uncomplicated procedure in the standard fashion. Description of Procedure:  Following adequate general anesthesia, th see the findings in the Operative Findings section of this dictation. Three other trocars were placed: a 5-mm port in the subxiphoid region and two 5-mm ports in the subcostal region of the midclavicular and anterior axillary lines.   Using the above ports suctioned from the abdominal cavity. We then completed our repair of this ventral incisional hernia. This was done with #1 Ethibond sutures in a transverse fashion. 4 or 5 sutures were placed.   Once they were secured, the umbilicus was inverted back

## 2018-03-28 NOTE — H&P
Active Problems      1. Calculus of gallbladder with acute on chronic cholecystitis without obstruction    2. Incarcerated umbilical hernia    3. Elevated liver enzymes    4. Overweight (BMI 25.0-29. 9)          Chief Complaint     Biliary colic      Histor TECHNIQUE:  Real time gray-scale ultrasound was used to evaluate the abdomen. The exam includes images of the liver, gallbladder, common bile duct, pancreas, spleen, kidneys, IVC, and aorta.      PATIENT STATED HISTORY: (As transcribed by Technologist)  Pa 6/2014: WILFREDO NEEDLE LOCALIZATION W/ SPECIMEN 1 SITE RIG* Right      Comment: Fibroadenoma  5/2014: NEEDLE BIOPSY LEFT Left      Comment: US guided biopsy - IDC  age 13: OTHER SURGICAL HISTORY      Comment: right arm surgery  age 1: OTHER SURGICAL HISTORY TAMOXIFEN CITRATE 20 MG Oral Tab TAKE 1 TABLET BY MOUTH DAILY Disp: 30 tablet Rfl: 8   alprazolam (XANAX) 0.5 MG Oral Tab Take 1 tablet (0.5 mg total) by mouth nightly as needed for Anxiety.  Disp: 30 tablet Rfl: 2   omega-3 fatty acids (FISH OIL) 1000 MG O Psychiatric/Behavioral: Negative for behavioral problems and sleep disturbance. Physical Findings      Ht 62\"   Wt 163 lb   BMI 29.81 kg/m²   Physical Exam   Constitutional: She is oriented to person, place, and time.  She appears well-developed an Clinical examination reveals her abdomen to be soft, nondistended, currently nontender, no guarding or rebound. She has a negative Olguin sign. Liver and spleen are not palpable. Her BMI is 29.81. Bowel sounds have normal activity and normal pitch.   Anthony Domínguez She has never had hepatitis. She never has had a stomach ulcer. She has never had angina. She does not have a diagnosis of gastroesophageal reflux disease. She has never had dark colored urine, or light colored stool.      Her most recent ultrasound

## 2018-03-28 NOTE — ANESTHESIA POSTPROCEDURE EVALUATION
[address] Patient Status:  Hospital Outpatient Surgery   Age/Gender 50year old female MRN [id number]   [facility] SURGERY Attending Palomo Macias MD   Hosp Day # 0 PCP Jon Carter DO       Anesthesia Post-op Not

## 2018-03-28 NOTE — ANESTHESIA PREPROCEDURE EVALUATION
PRE-OP EVALUATION    Patient Name: Brody Means    Pre-op Diagnosis: Cholecystitis [K81.9]    Procedure(s):  LAPAROSCOPIC CHOLECYSTECTOMY WITH CHOLANGIOGRAM,   UMBILICAL HERNIORRHAPHY      Surgeon(s) and Role:     Lilly Patterson MD - Primary    Pre-op quadrant of left female breast ([facility] [address]     Drug, medication, or biological substance adverse effect, late effect     Plantar fasciitis of left foot     Routine general medical examination at a health care facility     Irregular menstrual bleeding     Overwei Other findings            ASA: 2   Plan: general  NPO status verified and patient meets guidelines. Post-procedure pain management plan discussed with surgeon and patient.       Plan/risks discussed with: patient                Present on Admission:  *

## 2018-04-03 ENCOUNTER — OFFICE VISIT (OUTPATIENT)
Dept: SURGERY | Facility: CLINIC | Age: 48
End: 2018-04-03

## 2018-04-03 VITALS
HEART RATE: 103 BPM | WEIGHT: 162 LBS | TEMPERATURE: 100 F | DIASTOLIC BLOOD PRESSURE: 85 MMHG | SYSTOLIC BLOOD PRESSURE: 127 MMHG | BODY MASS INDEX: 30 KG/M2

## 2018-04-03 DIAGNOSIS — K80.12 CALCULUS OF GALLBLADDER WITH ACUTE ON CHRONIC CHOLECYSTITIS WITHOUT OBSTRUCTION: Primary | ICD-10-CM

## 2018-04-03 DIAGNOSIS — K43.2 VENTRAL INCISIONAL HERNIA WITHOUT OBSTRUCTION OR GANGRENE: ICD-10-CM

## 2018-04-03 DIAGNOSIS — Z91.048 ALLERGY TO ADHESIVE: ICD-10-CM

## 2018-04-03 PROCEDURE — 99024 POSTOP FOLLOW-UP VISIT: CPT | Performed by: COLON & RECTAL SURGERY

## 2018-04-03 NOTE — PROGRESS NOTES
Post Operative Visit Note       Active Problems  1. Calculus of gallbladder with acute on chronic cholecystitis without obstruction    2. Ventral incisional hernia without obstruction or gangrene    3.  Allergy to adhesive         Chief Complaint   Patient CHOLECYSTECTOMY  03/28/2018: HERNIA SURGERY      Comment: ventral & umbilical hernia repair  06/02/2014: LUMPECTOMY LEFT Left      Comment: Invasive ductal CA  6/2014: WILFREDO NEEDLE LOCALIZATION W/ SPECIMEN 1 SITE RIG* Right      Comment: Fibroadenoma  5/2014 1 MG Oral Tab Take 1 tablet (1 mg total) by mouth 2 (two) times daily as needed for Sleep.  Disp: 60 tablet Rfl: 6   TAMOXIFEN CITRATE 20 MG Oral Tab TAKE 1 TABLET BY MOUTH DAILY Disp: 30 tablet Rfl: 8   alprazolam (XANAX) 0.5 MG Oral Tab Take 1 tablet (0.5 Clinical exam reveals the abdomen to be soft, nontender, nondistended, good bowel sounds. Incisions: The umbilical incision is slightly bigger than normal secondary to the patient's ventral hernia repair.   There is a vesicle type blistering and weepin umbilical incision is slightly bigger than normal secondary to the patient's ventral hernia repair. There is a vesicle type blistering and weeping from the tissues. It is partial-thickness. The incision itself is closed.   The 2 right upper quadrant late

## 2018-04-03 NOTE — PATIENT INSTRUCTIONS
This patient underwent laparoscopic cholecystectomy with cholangiogram.  She had a previous tubal ligation and a ventral hernia as well. The patient tolerated procedure presents for postoperative care. Procedure was performed on March 28, 2018.     This p

## 2018-04-04 ENCOUNTER — HOSPITAL ENCOUNTER (EMERGENCY)
Facility: HOSPITAL | Age: 48
Discharge: HOME | End: 2018-04-04
Attending: EMERGENCY MEDICINE
Payer: COMMERCIAL

## 2018-04-04 ENCOUNTER — TELEPHONE (OUTPATIENT)
Dept: SURGERY | Facility: CLINIC | Age: 48
End: 2018-04-04

## 2018-04-04 ENCOUNTER — APPOINTMENT (OUTPATIENT)
Dept: NUCLEAR MEDICINE | Facility: HOSPITAL | Age: 48
End: 2018-04-04
Attending: EMERGENCY MEDICINE
Payer: COMMERCIAL

## 2018-04-04 VITALS
BODY MASS INDEX: 29.81 KG/M2 | OXYGEN SATURATION: 97 % | WEIGHT: 162 LBS | TEMPERATURE: 100 F | SYSTOLIC BLOOD PRESSURE: 111 MMHG | RESPIRATION RATE: 18 BRPM | HEIGHT: 62 IN | HEART RATE: 98 BPM | DIASTOLIC BLOOD PRESSURE: 77 MMHG

## 2018-04-04 DIAGNOSIS — R10.9 ABDOMINAL PAIN OF UNKNOWN ETIOLOGY: Primary | ICD-10-CM

## 2018-04-04 PROCEDURE — 85025 COMPLETE CBC W/AUTO DIFF WBC: CPT | Performed by: EMERGENCY MEDICINE

## 2018-04-04 PROCEDURE — 99285 EMERGENCY DEPT VISIT HI MDM: CPT

## 2018-04-04 PROCEDURE — 80053 COMPREHEN METABOLIC PANEL: CPT | Performed by: EMERGENCY MEDICINE

## 2018-04-04 PROCEDURE — 36415 COLL VENOUS BLD VENIPUNCTURE: CPT

## 2018-04-04 PROCEDURE — 99284 EMERGENCY DEPT VISIT MOD MDM: CPT

## 2018-04-04 PROCEDURE — 78226 HEPATOBILIARY SYSTEM IMAGING: CPT | Performed by: EMERGENCY MEDICINE

## 2018-04-04 PROCEDURE — 83690 ASSAY OF LIPASE: CPT | Performed by: EMERGENCY MEDICINE

## 2018-04-04 PROCEDURE — 78299 UNLISTED GI PX DX NUC MED: CPT | Performed by: EMERGENCY MEDICINE

## 2018-04-04 RX ORDER — HYDROCODONE BITARTRATE AND ACETAMINOPHEN 5; 325 MG/1; MG/1
1-2 TABLET ORAL EVERY 4 HOURS PRN
Qty: 20 TABLET | Refills: 0 | Status: SHIPPED | OUTPATIENT
Start: 2018-04-04 | End: 2018-04-10 | Stop reason: ALTCHOICE

## 2018-04-04 RX ORDER — AMOXICILLIN AND CLAVULANATE POTASSIUM 875; 125 MG/1; MG/1
1 TABLET, FILM COATED ORAL 2 TIMES DAILY
Qty: 20 TABLET | Refills: 0 | Status: SHIPPED | OUTPATIENT
Start: 2018-04-04 | End: 2018-04-10

## 2018-04-04 NOTE — TELEPHONE ENCOUNTER
Pt called today and stated while taking bra off yesterday began with R under Breast pain. Pt states pain worse when she takes a deep breath. Offered her an appointment to be seen tomorrow.  She said she will call back, I instructed her if pain gets worse sh

## 2018-04-04 NOTE — ED INITIAL ASSESSMENT (HPI)
51 y/o female to ED with c/o of RUQ \"pulsing pain\". Patient reports she had cholecystectomy 3/28, reports pain started last night, started when she \"un hooked my bra\". Patient reports pain is relieved when she applies pressure.  Patient was at the lui

## 2018-04-04 NOTE — ED PROVIDER NOTES
Patient Seen in: [facility] Emergency Department    History   Patient presents with:  Abdomen/Flank Pain (GI/)    Stated Complaint: gallbladder out on 3/28, now having right upper quadrant abdominal pain.      YOANNA    Tawnya Martínez is a 77-year-old female pr Former Smoker                                                              Packs/day: 1.00      Years: 15.00        Quit date: 3/1/2003  Smokeless tobacco: Never Used                      Alcohol use: Yes           0.0 oz/week     Comment: rarely      Revi Please view results for these tests on the individual orders.    RAINBOW DRAW BLUE   RAINBOW DRAW LAVENDER   RAINBOW DRAW LIGHT GREEN   RAINBOW DRAW GOLD       ED Course as of Apr 04 1952  ------------------------------------------------------------

## 2018-04-09 ENCOUNTER — TELEPHONE (OUTPATIENT)
Dept: SURGERY | Facility: CLINIC | Age: 48
End: 2018-04-09

## 2018-04-09 RX ORDER — CLINDAMYCIN HYDROCHLORIDE 300 MG/1
300 CAPSULE ORAL 3 TIMES DAILY
Qty: 30 CAPSULE | Refills: 0 | Status: SHIPPED | OUTPATIENT
Start: 2018-04-09 | End: 2018-04-19

## 2018-04-09 NOTE — TELEPHONE ENCOUNTER
Pt called c/o rash to her thighs been on Augmentin talked with PA told pt to stop augmentin and start clindamycin this was ordered for pt. Pt has f/u appointment tomorrow. Pt stated good understanding.

## 2018-04-10 ENCOUNTER — OFFICE VISIT (OUTPATIENT)
Dept: SURGERY | Facility: CLINIC | Age: 48
End: 2018-04-10

## 2018-04-10 VITALS
BODY MASS INDEX: 30 KG/M2 | WEIGHT: 163 LBS | SYSTOLIC BLOOD PRESSURE: 112 MMHG | RESPIRATION RATE: 16 BRPM | HEIGHT: 62 IN | DIASTOLIC BLOOD PRESSURE: 76 MMHG | HEART RATE: 94 BPM

## 2018-04-10 DIAGNOSIS — L50.9 URTICARIA: ICD-10-CM

## 2018-04-10 DIAGNOSIS — R10.11 RUQ ABDOMINAL PAIN: Primary | ICD-10-CM

## 2018-04-10 DIAGNOSIS — K80.12 CALCULUS OF GALLBLADDER WITH ACUTE ON CHRONIC CHOLECYSTITIS WITHOUT OBSTRUCTION: ICD-10-CM

## 2018-04-10 DIAGNOSIS — K43.2 VENTRAL INCISIONAL HERNIA WITHOUT OBSTRUCTION OR GANGRENE: ICD-10-CM

## 2018-04-10 PROCEDURE — 99024 POSTOP FOLLOW-UP VISIT: CPT | Performed by: PHYSICIAN ASSISTANT

## 2018-04-10 NOTE — PROGRESS NOTES
Follow Up Visit Note       Active Problems      1. RUQ abdominal pain    2. Calculus of gallbladder with acute on chronic cholecystitis without obstruction    3. Ventral incisional hernia without obstruction or gangrene    4.  Urticaria          Chief Compl ([facility] [address] 5/2014    Invasive ductal CA   • Depression    • Disorder of liver     SOTO  fatty liver   • Hearing impairment     70% hearing loss in left ear   • History of HPV infection 6/2/2015   • Normal delivery 2-    child birth   • Normal delivery 10 Alcohol use: Yes           0.0 oz/week       Comment: rarely    Drug use: No            Other Topics            Concern  Caffeine Concern        Yes    Comment:1 large sweet tea daily  Exercise                Yes    Comment:1-2 times abdominal pain. Negative for abdominal distention, anal bleeding, blood in stool, constipation, diarrhea, nausea and vomiting. Genitourinary: Negative for difficulty urinating, dysuria, frequency and urgency.    Musculoskeletal: Negative for arthralgias a encounter diagnosis)  Calculus of gallbladder with acute on chronic cholecystitis without obstruction  Ventral incisional hernia without obstruction or gangrene  Urticaria    Plan   The patient has some residual right upper quadrant pain following her surg

## 2018-05-13 NOTE — PROGRESS NOTES
[facility] Progress Note      Patient Name:  Kim Hart  YOB: 1970  Medical Record Number:  [id number]    Date of visit:  5/14/2018    CHIEF COMPLAINT:  Stage I  L breast carcinoma s/p lumpectomy.     HPI:      50year old pre TABLET BY MOUTH DAILY Disp: 30 tablet Rfl: 8   alprazolam (XANAX) 0.5 MG Oral Tab Take 1 tablet (0.5 mg total) by mouth nightly as needed for Anxiety. Disp: 30 tablet Rfl: 2   Vitamin D3 (VITAMIN D3) 1000 UNITS Oral Tab Take 2,000 Units by mouth daily.    D Alt 40 14 - 54 U/L   Bilirubin, Total 0.3 0.1 - 2.0 mg/dL   Total Protein 6.9 6.1 - 8.3 g/dL   Albumin 3.3 (L) 3.5 - 4.8 g/dL   Sodium 142 136 - 144 mmol/L   Potassium 4.1 3.6 - 5.1 mmol/L   Chloride 110 101 - 111 mmol/L   CO2 24.0 22.0 - 32.0 mmol/L   - tablet 6      Sig: Take 1 tablet (1 mg total) by mouth 3 (three) times daily as needed for Sleep. Return in about 6 months (around 11/14/2018) for Madeleine Franks M.D.     [facility]    [facility]  [address]

## 2018-05-14 ENCOUNTER — OFFICE VISIT (OUTPATIENT)
Dept: HEMATOLOGY/ONCOLOGY | Age: 48
End: 2018-05-14
Attending: INTERNAL MEDICINE
Payer: COMMERCIAL

## 2018-05-14 VITALS
RESPIRATION RATE: 18 BRPM | DIASTOLIC BLOOD PRESSURE: 75 MMHG | HEART RATE: 96 BPM | SYSTOLIC BLOOD PRESSURE: 111 MMHG | WEIGHT: 164.63 LBS | BODY MASS INDEX: 30 KG/M2 | TEMPERATURE: 97 F | OXYGEN SATURATION: 98 %

## 2018-05-14 DIAGNOSIS — R74.8 ELEVATED LIVER ENZYMES: ICD-10-CM

## 2018-05-14 DIAGNOSIS — N92.1 MENORRHAGIA WITH IRREGULAR CYCLE: ICD-10-CM

## 2018-05-14 DIAGNOSIS — F41.9 ANXIETY: ICD-10-CM

## 2018-05-14 DIAGNOSIS — C50.412 PRIMARY MALIGNANT NEOPLASM OF UPPER OUTER QUADRANT OF LEFT FEMALE BREAST (HCC): Primary | ICD-10-CM

## 2018-05-14 PROCEDURE — 99214 OFFICE O/P EST MOD 30 MIN: CPT | Performed by: INTERNAL MEDICINE

## 2018-05-14 RX ORDER — ALPRAZOLAM 1 MG/1
1 TABLET ORAL 3 TIMES DAILY PRN
Qty: 90 TABLET | Refills: 6 | Status: SHIPPED | OUTPATIENT
Start: 2018-05-14 | End: 2019-12-09

## 2018-05-14 NOTE — PROGRESS NOTES
Pt here for MD f/u visit for h/o breast ca. Continues on Tamoxifen daily. Some hot flashes. LMP 5/10/18. Spotting in April. Denies joint pain/stiffness.    Education Record    Learner:  Patient    Disease / Diagnosis:breast ca    Barriers / Limitations:  No

## 2018-05-29 RX ORDER — TAMOXIFEN CITRATE 20 MG/1
TABLET ORAL
Qty: 30 TABLET | Refills: 5 | Status: SHIPPED | OUTPATIENT
Start: 2018-05-29 | End: 2018-11-29

## 2018-11-12 ENCOUNTER — OFFICE VISIT (OUTPATIENT)
Dept: HEMATOLOGY/ONCOLOGY | Age: 48
End: 2018-11-12
Attending: INTERNAL MEDICINE
Payer: COMMERCIAL

## 2018-11-12 VITALS
OXYGEN SATURATION: 99 % | SYSTOLIC BLOOD PRESSURE: 121 MMHG | WEIGHT: 165 LBS | HEART RATE: 89 BPM | BODY MASS INDEX: 30 KG/M2 | TEMPERATURE: 97 F | RESPIRATION RATE: 18 BRPM | DIASTOLIC BLOOD PRESSURE: 78 MMHG

## 2018-11-12 DIAGNOSIS — E61.1 IRON DEFICIENCY: ICD-10-CM

## 2018-11-12 DIAGNOSIS — E55.9 VITAMIN D DEFICIENCY: Primary | ICD-10-CM

## 2018-11-12 DIAGNOSIS — C50.412 PRIMARY MALIGNANT NEOPLASM OF UPPER OUTER QUADRANT OF LEFT FEMALE BREAST (HCC): ICD-10-CM

## 2018-11-12 DIAGNOSIS — N92.1 MENORRHAGIA WITH IRREGULAR CYCLE: ICD-10-CM

## 2018-11-12 DIAGNOSIS — R74.8 ELEVATED LIVER ENZYMES: ICD-10-CM

## 2018-11-12 PROCEDURE — 99214 OFFICE O/P EST MOD 30 MIN: CPT | Performed by: INTERNAL MEDICINE

## 2018-11-12 RX ORDER — VENLAFAXINE HYDROCHLORIDE 75 MG/1
75 CAPSULE, EXTENDED RELEASE ORAL
Qty: 30 CAPSULE | Refills: 11 | Status: SHIPPED | OUTPATIENT
Start: 2018-11-12 | End: 2019-11-25

## 2018-11-12 RX ORDER — ALPRAZOLAM 0.5 MG/1
0.5 TABLET ORAL EVERY 6 HOURS PRN
Qty: 120 TABLET | Refills: 6 | Status: SHIPPED | OUTPATIENT
Start: 2018-11-12 | End: 2019-07-15

## 2018-11-12 NOTE — PROGRESS NOTES
Pt here for 6mth MD f/u visit for h/o breast ca. Pt continues on Tamoxifen daily. Vaginal discharge daily. Pt states discharge does have an odor.    Outpatient Oncology Care Plan  Problem list:  knowledge deficit    Problems related to:    disease/disease p

## 2018-11-12 NOTE — PROGRESS NOTES
[facility] Progress Note      Patient Name:  Ellen Chamberlain  YOB: 1970  Medical Record Number:  [id number]    Date of visit:  5/14/2018    CHIEF COMPLAINT:  Stage I  L breast carcinoma s/p lumpectomy.     HPI:      46year old pre mouth 3 (three) times daily as needed for Sleep. Disp: 90 tablet Rfl: 6   Krill Oil 1000 MG Oral Cap Take by mouth. Disp:  Rfl:    Venlafaxine HCl ER 75 MG Oral Capsule SR 24 Hr Take 1 capsule (75 mg total) by mouth once daily.  Disp: 90 capsule Rfl: 3   al Potassium 4.2 3.6 - 5.1 mmol/L    Chloride 108 101 - 111 mmol/L    CO2 24.0 22.0 - 32.0 mmol/L    Anion Gap 8 0 - 18 mmol/L    BUN 13 8 - 20 mg/dL    Creatinine 0.95 0.55 - 1.02 mg/dL    BUN/CREA Ratio 13.7 10.0 - 20.0    Calcium, Total 8.4 8.3 - 10.3 m s/p left breast lumpectomy and was found to have 1.1 cm focus of grade 2 invasive ductal carcinoma along with DCIS. Tumor is ER/OH positive and HER-2 negative with an Oncotype DX recurrence score of 17.  She started tamoxifen in 10/14 and plan is to keep he

## 2018-11-13 ENCOUNTER — TELEPHONE (OUTPATIENT)
Dept: HEMATOLOGY/ONCOLOGY | Facility: HOSPITAL | Age: 48
End: 2018-11-13

## 2018-11-13 RX ORDER — ERGOCALCIFEROL 1.25 MG/1
50000 CAPSULE ORAL WEEKLY
Qty: 12 CAPSULE | Refills: 0 | Status: SHIPPED | OUTPATIENT
Start: 2018-11-13 | End: 2019-02-10

## 2018-11-13 NOTE — TELEPHONE ENCOUNTER
Mayela Mcdowell MD  [facility] [address] [facility] Rns             Call, vit d low, Rx sent, also Fe low. Riaz OTC iron any brand/dose once every other day     Unable to reach pt by phone.  Left VM with MD instructions and requested a call back to RN triage line with a

## 2018-11-16 NOTE — TELEPHONE ENCOUNTER
Patient received RN's message regarding lab results. Picked up over the counter iron and Rx for Vitamin d. Patient will take as directed.

## 2018-11-29 RX ORDER — TAMOXIFEN CITRATE 20 MG/1
TABLET ORAL
Qty: 30 TABLET | Refills: 5 | Status: SHIPPED | OUTPATIENT
Start: 2018-11-29 | End: 2019-04-12

## 2018-12-11 ENCOUNTER — HOSPITAL ENCOUNTER (OUTPATIENT)
Dept: MAMMOGRAPHY | Age: 48
Discharge: HOME | End: 2018-12-11
Attending: INTERNAL MEDICINE
Payer: COMMERCIAL

## 2018-12-11 DIAGNOSIS — C50.412 PRIMARY MALIGNANT NEOPLASM OF UPPER OUTER QUADRANT OF LEFT FEMALE BREAST (HCC): ICD-10-CM

## 2018-12-11 PROCEDURE — 77062 BREAST TOMOSYNTHESIS BI: CPT | Performed by: INTERNAL MEDICINE

## 2018-12-11 PROCEDURE — 77066 DX MAMMO INCL CAD BI: CPT | Performed by: INTERNAL MEDICINE

## 2019-02-10 DIAGNOSIS — C50.412 PRIMARY MALIGNANT NEOPLASM OF UPPER OUTER QUADRANT OF LEFT FEMALE BREAST (HCC): Primary | ICD-10-CM

## 2019-02-11 RX ORDER — ERGOCALCIFEROL 1.25 MG/1
CAPSULE ORAL
Qty: 12 CAPSULE | Refills: 0 | Status: SHIPPED | OUTPATIENT
Start: 2019-02-11 | End: 2020-02-14

## 2019-04-12 RX ORDER — TAMOXIFEN CITRATE 20 MG/1
TABLET ORAL
Qty: 30 TABLET | Refills: 3 | Status: SHIPPED | OUTPATIENT
Start: 2019-04-12 | End: 2019-12-09

## 2019-05-06 ENCOUNTER — PATIENT OUTREACH (OUTPATIENT)
Dept: FAMILY MEDICINE CLINIC | Facility: CLINIC | Age: 49
End: 2019-05-06

## 2019-05-07 DIAGNOSIS — C50.412 PRIMARY MALIGNANT NEOPLASM OF UPPER OUTER QUADRANT OF LEFT FEMALE BREAST (HCC): ICD-10-CM

## 2019-05-07 RX ORDER — ERGOCALCIFEROL 1.25 MG/1
CAPSULE ORAL
Qty: 12 CAPSULE | Refills: 0 | OUTPATIENT
Start: 2019-05-07

## 2019-05-12 NOTE — PROGRESS NOTES
[facility] Progress Note      Patient Name:  Alida Oswald  YOB: 1970  Medical Record Number:  [id number]    Date of visit:  5/13/2019    CHIEF COMPLAINT:  Stage I  L breast carcinoma s/p lumpectomy.     HPI:      46year old pre needed for Anxiety. Disp: 120 tablet Rfl: 6   Venlafaxine HCl ER 75 MG Oral Capsule SR 24 Hr Take 1 capsule (75 mg total) by mouth once daily.  Disp: 30 capsule Rfl: 11   ALPRAZolam (XANAX) 1 MG Oral Tab Take 1 tablet (1 mg total) by mouth 3 (three) times d psychosocial intervention were identified.      LABS:     Recent Results (from the past 24 hour(s))   COMP METABOLIC PANEL (14)    Collection Time: 05/13/19  1:23 PM   Result Value Ref Range    Glucose 88 70 - 99 mg/dL    Sodium 141 136 - 145 mmol/L    Pota ductal carcinoma along with DCIS. Tumor is ER/SC positive and HER-2 negative with an Oncotype DX recurrence score of 17. She started tamoxifen in 10/14 and plan is to keep her on endocrine Rx for 5 years till 10/19.   Pap smear is overdue-discussed again ri

## 2019-05-13 NOTE — PROGRESS NOTES
Pt here for 6mth MD f/u visit for h/o breast ca. Pt continues on Tamoxifen daily. Some HF. Denies pain.      Outpatient Oncology Care Plan  Problem list:  knowledge deficit     Problems related to:    disease/disease progression  side effect of treatment

## 2019-07-15 ENCOUNTER — OFFICE VISIT (OUTPATIENT)
Dept: FAMILY MEDICINE CLINIC | Facility: CLINIC | Age: 49
End: 2019-07-15
Payer: COMMERCIAL

## 2019-07-15 VITALS
SYSTOLIC BLOOD PRESSURE: 110 MMHG | DIASTOLIC BLOOD PRESSURE: 76 MMHG | HEART RATE: 76 BPM | BODY MASS INDEX: 32.02 KG/M2 | WEIGHT: 174 LBS | HEIGHT: 62 IN

## 2019-07-15 DIAGNOSIS — H93.19 TINNITUS, UNSPECIFIED LATERALITY: ICD-10-CM

## 2019-07-15 DIAGNOSIS — Z12.11 SCREENING FOR MALIGNANT NEOPLASM OF COLON: ICD-10-CM

## 2019-07-15 DIAGNOSIS — Z23 NEED FOR VACCINATION: ICD-10-CM

## 2019-07-15 DIAGNOSIS — E66.09 CLASS 1 OBESITY DUE TO EXCESS CALORIES WITHOUT SERIOUS COMORBIDITY WITH BODY MASS INDEX (BMI) OF 31.0 TO 31.9 IN ADULT: ICD-10-CM

## 2019-07-15 DIAGNOSIS — Z00.00 ROUTINE GENERAL MEDICAL EXAMINATION AT A HEALTH CARE FACILITY: Primary | ICD-10-CM

## 2019-07-15 DIAGNOSIS — N84.1 CERVICAL POLYP: ICD-10-CM

## 2019-07-15 PROBLEM — E66.3 OVERWEIGHT (BMI 25.0-29.9): Status: RESOLVED | Noted: 2018-02-12 | Resolved: 2019-07-15

## 2019-07-15 PROBLEM — K43.2 VENTRAL INCISIONAL HERNIA WITHOUT OBSTRUCTION OR GANGRENE: Status: RESOLVED | Noted: 2018-04-03 | Resolved: 2019-07-15

## 2019-07-15 PROBLEM — K42.0 INCARCERATED UMBILICAL HERNIA: Status: RESOLVED | Noted: 2018-03-20 | Resolved: 2019-07-15

## 2019-07-15 PROBLEM — K80.12 CHOLELITHIASIS WITH ACUTE ON CHRONIC CHOLECYSTITIS WITHOUT BILIARY OBSTRUCTION: Status: RESOLVED | Noted: 2018-03-20 | Resolved: 2019-07-15

## 2019-07-15 PROBLEM — M72.2 PLANTAR FASCIITIS OF LEFT FOOT: Status: RESOLVED | Noted: 2017-01-23 | Resolved: 2019-07-15

## 2019-07-15 PROBLEM — E66.811 CLASS 1 OBESITY DUE TO EXCESS CALORIES WITHOUT SERIOUS COMORBIDITY WITH BODY MASS INDEX (BMI) OF 31.0 TO 31.9 IN ADULT: Status: ACTIVE | Noted: 2019-07-15

## 2019-07-15 PROCEDURE — 90715 TDAP VACCINE 7 YRS/> IM: CPT | Performed by: FAMILY MEDICINE

## 2019-07-15 PROCEDURE — 99396 PREV VISIT EST AGE 40-64: CPT | Performed by: FAMILY MEDICINE

## 2019-07-15 PROCEDURE — 90472 IMMUNIZATION ADMIN EACH ADD: CPT | Performed by: FAMILY MEDICINE

## 2019-07-15 PROCEDURE — 90471 IMMUNIZATION ADMIN: CPT | Performed by: FAMILY MEDICINE

## 2019-07-15 NOTE — PATIENT INSTRUCTIONS
Prevention Guidelines, Women Ages 48 to 59  Screening tests and vaccines are an important part of managing your health. A screening test is done to find possible disorders or diseases in people who don't have any symptoms.  The goal is to find a disease e Chlamydia Women at increased risk for infection At routine exams   Colorectal cancer All women in this age group Flexible sigmoidoscopy every 5 years, or colonoscopy every 10 years, or double-contrast barium enema every 5 years; yearly fecal occult blood t Hepatitis B Women at increased risk for infection – talk with your healthcare provider 3 doses over 6 months; second dose should be given 1 month after the first dose; the third dose should be given at least 2 months after the second dose and at least 4 mo Use of daily aspirin Women ages 54 and up in this age group who are at risk for cardiovascular health problems such as stroke When your risk is known   Use of tobacco and the health effects it can cause All women in this age group Every exam   1Ameryadiel Ca

## 2019-07-15 NOTE — PROGRESS NOTES
HPI:   Genet Villanueva is a 46year old female   Symptoms: denies discharge, itching, burning or dysuria, periods are irregular.    Last PAP: 2016  Abnormal PAP: yes  Sexually active: no   Last colonoscopy: n/a      Patient complains of chronic tinnitus whi Vitamins (B COMPLEX OR) Take  by mouth. Disp:  Rfl:    Cyanocobalamin (VITAMIN B-12) 5000 MCG Sublingual SL Tab Place 1 tablet under the tongue daily. Disp:  Rfl:    Nutritional Supplements (ANTIOXIDANTS OR) Take  by mouth.  Take as directed Disp:  Rfl: 17-    Andrea Peres      Family History   Problem Relation Age of Onset   • Cancer Paternal Grandmother         breast   • Breast Cancer Paternal Grandmother 72   • Diabetes Maternal Grandmother    • Breast Cancer Maternal Grandm lesions  HEENT: atraumatic, normocephalic,ears, nose and throat are normal, mmm  EYES: PERRLA, EOMI, sclera, conjunctiva are clear  NECK: supple, no adenopathy/thyromegaly/masses  LUNGS: clear to auscultation bilateral, no rales, rhonchi or wheezing  CARDI laterality  Consult Dr. Mickael Schwab for further evaluation and treatment.     4. Class 1 obesity due to excess calories without serious comorbidity with body mass index (BMI) of 31.0 to 31.9 in adult  Recommend healthy diet including green leafy vegetables, fres

## 2019-08-12 ENCOUNTER — HOSPITAL ENCOUNTER (OUTPATIENT)
Dept: MRI IMAGING | Age: 49
Discharge: HOME | End: 2019-08-12
Attending: OTOLARYNGOLOGY
Payer: COMMERCIAL

## 2019-08-12 DIAGNOSIS — H90.5 SENSORINEURAL HEARING LOSS (SNHL) OF LEFT EAR, UNSPECIFIED HEARING STATUS ON CONTRALATERAL SIDE: ICD-10-CM

## 2019-08-12 DIAGNOSIS — H90.3 SENSORINEURAL HEARING LOSS, ASYMMETRICAL: ICD-10-CM

## 2019-08-12 LAB — CREAT BLD-MCNC: 0.9 MG/DL (ref 0.55–1.02)

## 2019-08-12 PROCEDURE — 82565 ASSAY OF CREATININE: CPT

## 2019-08-12 PROCEDURE — A9575 INJ GADOTERATE MEGLUMI 0.1ML: HCPCS | Performed by: OTOLARYNGOLOGY

## 2019-08-12 PROCEDURE — 70553 MRI BRAIN STEM W/O & W/DYE: CPT | Performed by: OTOLARYNGOLOGY

## 2019-08-19 ENCOUNTER — OFFICE VISIT (OUTPATIENT)
Dept: OBGYN CLINIC | Facility: CLINIC | Age: 49
End: 2019-08-19
Payer: COMMERCIAL

## 2019-08-19 VITALS
WEIGHT: 174 LBS | BODY MASS INDEX: 29.71 KG/M2 | HEIGHT: 64 IN | DIASTOLIC BLOOD PRESSURE: 74 MMHG | SYSTOLIC BLOOD PRESSURE: 122 MMHG

## 2019-08-19 DIAGNOSIS — N85.2 ENLARGED UTERUS: ICD-10-CM

## 2019-08-19 DIAGNOSIS — Z12.4 CERVICAL CANCER SCREENING: ICD-10-CM

## 2019-08-19 DIAGNOSIS — N84.1 CERVICAL POLYP: Primary | ICD-10-CM

## 2019-08-19 DIAGNOSIS — Z85.3 PERSONAL HISTORY OF BREAST CANCER: ICD-10-CM

## 2019-08-19 PROCEDURE — 88175 CYTOPATH C/V AUTO FLUID REDO: CPT | Performed by: NURSE PRACTITIONER

## 2019-08-19 PROCEDURE — 99203 OFFICE O/P NEW LOW 30 MIN: CPT | Performed by: NURSE PRACTITIONER

## 2019-08-19 PROCEDURE — 88305 TISSUE EXAM BY PATHOLOGIST: CPT | Performed by: NURSE PRACTITIONER

## 2019-08-19 PROCEDURE — 57500 BIOPSY OF CERVIX: CPT | Performed by: NURSE PRACTITIONER

## 2019-08-19 NOTE — PROGRESS NOTES
Here for new gynecology visit. 46year old G 2 P 2. Patient's last menstrual period was 08/18/2019 (exact date). .     Here for a pap smear and was told she had a cervical polyp.  She also feels she may have some kind of prolapse, she has been retaining ur 03/28/2018   • HERNIA SURGERY  03/28/2018    ventral & umbilical hernia repair   • HERNIA UMBILICAL REPAIR ADULT N/A 3/28/2018    Performed by Lg Tran MD at [address]   • [address] [place] [address] N/A 3/28/2018    Performed by Lg Tran MD Supplements (ANTIOXIDANTS OR) Take  by mouth. Take as directed Disp:  Rfl:      No current facility-administered medications on file prior to visit.      OB History    Para Term  AB Living   2 2 2 0 0 2   SAB TAB Ectopic Multiple Live Births

## 2019-11-25 DIAGNOSIS — C50.412 PRIMARY MALIGNANT NEOPLASM OF UPPER OUTER QUADRANT OF LEFT FEMALE BREAST (HCC): ICD-10-CM

## 2019-11-25 DIAGNOSIS — R74.8 ELEVATED LIVER ENZYMES: ICD-10-CM

## 2019-11-25 DIAGNOSIS — N92.1 MENORRHAGIA WITH IRREGULAR CYCLE: ICD-10-CM

## 2019-11-25 RX ORDER — VENLAFAXINE HYDROCHLORIDE 75 MG/1
CAPSULE, EXTENDED RELEASE ORAL
Qty: 90 CAPSULE | Refills: 3 | Status: SHIPPED | OUTPATIENT
Start: 2019-11-25 | End: 2020-09-21

## 2019-12-08 NOTE — PROGRESS NOTES
[facility] Progress Note      Patient Name:  Deanna Thomas  YOB: 1970  Medical Record Number:  [id number]    Date of visit: 12/9/2019    CHIEF COMPLAINT:  Stage I  L breast carcinoma s/p lumpectomy.     HPI:      44year old pre- 120 tablet 6   • ERGOCALCIFEROL 61519 units Oral Cap TAKE 1 CAPSULE BY MOUTH ONCE A WEEK FOR 12 DOSES. 12 capsule 0   • Krill Oil 1000 MG Oral Cap Take by mouth.      • alprazolam (XANAX) 0.5 MG Oral Tab Take 1 tablet (0.5 mg total) by mouth nightly as need - 18 mg/dL    Creatinine 1.02 0.55 - 1.02 mg/dL    BUN/CREA Ratio 14.7 10.0 - 20.0    Calcium, Total 9.0 8.5 - 10.1 mg/dL    Calculated Osmolality 289 275 - 295 mOsm/kg    GFR, Non- 64 >=60    GFR, -American 74 >=60    AST 36 15 - 37 signs and symptoms of recurrence, continue surveillance. # Anxiety: Tolerating Effexor well. Xanax prescription given for insomnia. She wishes to stay on Effexor for now. May go to every other day and then wean off and stop when she is ready.     # Vi

## 2019-12-09 NOTE — PROGRESS NOTES
Pt here for 6mth MD f/u visit for h/o BRCA. Pt stopped Tamoxifen at the end of August (was due to complete in October) d/t \"horrible HF'.    Outpatient Oncology Care Plan  Problem list:  knowledge deficit     Problems related to:    disease/disease progres

## 2019-12-17 ENCOUNTER — HOSPITAL ENCOUNTER (OUTPATIENT)
Dept: MAMMOGRAPHY | Age: 49
Discharge: HOME | End: 2019-12-17
Attending: INTERNAL MEDICINE
Payer: COMMERCIAL

## 2019-12-17 DIAGNOSIS — N92.1 MENORRHAGIA WITH IRREGULAR CYCLE: ICD-10-CM

## 2019-12-17 DIAGNOSIS — C50.412 PRIMARY MALIGNANT NEOPLASM OF UPPER OUTER QUADRANT OF LEFT FEMALE BREAST (HCC): ICD-10-CM

## 2019-12-17 DIAGNOSIS — R74.8 ELEVATED LIVER ENZYMES: ICD-10-CM

## 2019-12-17 PROCEDURE — 77062 BREAST TOMOSYNTHESIS BI: CPT | Performed by: INTERNAL MEDICINE

## 2019-12-17 PROCEDURE — 77066 DX MAMMO INCL CAD BI: CPT | Performed by: INTERNAL MEDICINE

## 2020-01-21 ENCOUNTER — HOSPITAL ENCOUNTER (OUTPATIENT)
Dept: ULTRASOUND IMAGING | Age: 50
Discharge: HOME | End: 2020-01-21
Attending: NURSE PRACTITIONER
Payer: COMMERCIAL

## 2020-01-21 DIAGNOSIS — Z85.3 PERSONAL HISTORY OF BREAST CANCER: ICD-10-CM

## 2020-01-21 DIAGNOSIS — N85.2 ENLARGED UTERUS: ICD-10-CM

## 2020-01-21 PROCEDURE — 76856 US EXAM PELVIC COMPLETE: CPT | Performed by: NURSE PRACTITIONER

## 2020-01-21 PROCEDURE — 76830 TRANSVAGINAL US NON-OB: CPT | Performed by: NURSE PRACTITIONER

## 2020-02-14 ENCOUNTER — OFFICE VISIT (OUTPATIENT)
Dept: OBGYN CLINIC | Facility: CLINIC | Age: 50
End: 2020-02-14
Payer: COMMERCIAL

## 2020-02-14 VITALS
HEART RATE: 101 BPM | WEIGHT: 179.81 LBS | DIASTOLIC BLOOD PRESSURE: 70 MMHG | BODY MASS INDEX: 31 KG/M2 | SYSTOLIC BLOOD PRESSURE: 110 MMHG

## 2020-02-14 DIAGNOSIS — R93.89 THICKENED ENDOMETRIUM: ICD-10-CM

## 2020-02-14 DIAGNOSIS — Z92.29 H/O TAMOXIFEN THERAPY: ICD-10-CM

## 2020-02-14 DIAGNOSIS — Z85.3 PERSONAL HISTORY OF BREAST CANCER: Primary | ICD-10-CM

## 2020-02-14 PROCEDURE — 99213 OFFICE O/P EST LOW 20 MIN: CPT | Performed by: OBSTETRICS & GYNECOLOGY

## 2020-02-14 RX ORDER — RIBOFLAVIN (VITAMIN B2) 100 MG
100 TABLET ORAL DAILY
COMMUNITY

## 2020-02-14 NOTE — PROGRESS NOTES
German Mcclure is a 44year old female. HPI:   Patient presents with: Follow - Up: US review. Pt c/o  having cycle since nov 20. Pt states since the US was done her cycle been worse.        Here to discuss above  Stopped tamoxifen last year  Bleeding n 5/2014    US guided biopsy - IDC   • OTHER SURGICAL HISTORY  age 13    right arm surgery   • OTHER SURGICAL HISTORY  age 1    dental work under anesthesia   • RADIATION LEFT  2014   • TUBAL LIGATION  10-    [address] [address] to better evaluate adnexal and endometrial detail. PATIENT STATED HISTORY: (As transcribed by Technologist)  Patient states abnormal bleeding since August 2019. History of breast cancer. FINDINGS:                UTERUS:  15.02 cm x 7.39 cm x 9.

## 2020-02-17 ENCOUNTER — TELEPHONE (OUTPATIENT)
Dept: OBGYN CLINIC | Facility: CLINIC | Age: 50
End: 2020-02-17

## 2020-02-17 DIAGNOSIS — Z92.29 H/O TAMOXIFEN THERAPY: ICD-10-CM

## 2020-02-17 DIAGNOSIS — R93.89 THICKENED ENDOMETRIUM: ICD-10-CM

## 2020-02-17 DIAGNOSIS — Z85.3 PERSONAL HISTORY OF BREAST CANCER: Primary | ICD-10-CM

## 2020-02-17 NOTE — TELEPHONE ENCOUNTER
----- Message from Alice Fritz MD sent at 2/14/2020  3:25 PM CST -----  Surgery: diagnostic hysteroscopy, dilation and curettage    Diag; (Z85.3) Personal history of breast cancer  (primary encounter diagnosis)      (R93.89) Thickened endometrium

## 2020-02-17 NOTE — TELEPHONE ENCOUNTER
Surgery scheduled 3/31/2020  Post op - LVM for pt to schedule 2 week post op  Orders entered  Added to calendar  PA - no auth needed ref # [id number]

## 2020-02-20 NOTE — TELEPHONE ENCOUNTER
Post op scheduled   Future Appointments   Date Time Provider Byron Henson   4/13/2020 12:15 PM Niki Graham MD [facility] [address]

## 2020-03-11 ENCOUNTER — TELEPHONE (OUTPATIENT)
Dept: HEMATOLOGY/ONCOLOGY | Facility: HOSPITAL | Age: 50
End: 2020-03-11

## 2020-03-11 NOTE — TELEPHONE ENCOUNTER
Harjinder Back called to let Priyank Staples know that she is faxing a form over for her to start employment. She says she can't strt the job until it is signed and faxed back to the number listed on the form. Please call the patient when you've received this.

## 2020-03-13 ENCOUNTER — TELEPHONE (OUTPATIENT)
Dept: HEMATOLOGY/ONCOLOGY | Facility: HOSPITAL | Age: 50
End: 2020-03-13

## 2020-03-16 ENCOUNTER — NURSE ONLY (OUTPATIENT)
Dept: HEMATOLOGY/ONCOLOGY | Facility: HOSPITAL | Age: 50
End: 2020-03-16

## 2020-03-16 NOTE — PROGRESS NOTES
Received DOT Medical Documentation request for information from physicians immediate care. Form completed by dr. Catie Castaneda and faxed to medical records office per request, [phone]. Form sent to be scanned.

## 2020-03-18 RX ORDER — ACETAMINOPHEN 500 MG
1000 TABLET ORAL ONCE
Status: CANCELLED | OUTPATIENT
Start: 2020-03-18 | End: 2020-03-18

## 2020-03-19 ENCOUNTER — TELEPHONE (OUTPATIENT)
Dept: OBGYN CLINIC | Facility: CLINIC | Age: 50
End: 2020-03-19

## 2020-03-19 NOTE — TELEPHONE ENCOUNTER
Spouse calling and Pt is really bleeding a lot and unsteady and SOB    Spouse wants to see if we can move up surgery  Current date March 31st    Please advise Spouse

## 2020-03-19 NOTE — TELEPHONE ENCOUNTER
Spoke to patient's  who was requesting to move surgery up. I advised patient based on the situation we are unable to do so at this time. He did say patient is bleeding heavily, has shortness of breath, and is unsteady.  Advised patient's  go t

## 2020-03-21 ENCOUNTER — TELEPHONE (OUTPATIENT)
Dept: OBGYN CLINIC | Facility: CLINIC | Age: 50
End: 2020-03-21

## 2020-03-21 ENCOUNTER — HOSPITAL ENCOUNTER (EMERGENCY)
Facility: HOSPITAL | Age: 50
Discharge: HOME | End: 2020-03-22
Attending: EMERGENCY MEDICINE
Payer: COMMERCIAL

## 2020-03-21 ENCOUNTER — APPOINTMENT (OUTPATIENT)
Dept: LAB | Age: 50
End: 2020-03-21
Payer: COMMERCIAL

## 2020-03-21 DIAGNOSIS — N93.9 VAGINAL BLEEDING: Primary | ICD-10-CM

## 2020-03-21 DIAGNOSIS — R93.89 THICKENED ENDOMETRIUM: ICD-10-CM

## 2020-03-21 DIAGNOSIS — D64.9 SYMPTOMATIC ANEMIA: ICD-10-CM

## 2020-03-21 LAB
ALBUMIN SERPL-MCNC: 3.4 G/DL (ref 3.4–5)
ALBUMIN/GLOB SERPL: 0.9 {RATIO} (ref 1–2)
ALP LIVER SERPL-CCNC: 101 U/L (ref 41–108)
ALT SERPL-CCNC: 114 U/L (ref 13–56)
ANION GAP SERPL CALC-SCNC: 4 MMOL/L (ref 0–18)
ANTIBODY SCREEN: NEGATIVE
AST SERPL-CCNC: 89 U/L (ref 15–37)
BASOPHILS # BLD AUTO: 0.05 X10(3) UL (ref 0–0.2)
BASOPHILS NFR BLD AUTO: 0.7 %
BILIRUB SERPL-MCNC: 0.4 MG/DL (ref 0.1–2)
BUN BLD-MCNC: 12 MG/DL (ref 7–18)
BUN/CREAT SERPL: 10.9 (ref 10–20)
CALCIUM BLD-MCNC: 8.5 MG/DL (ref 8.5–10.1)
CHLORIDE SERPL-SCNC: 109 MMOL/L (ref 98–112)
CO2 SERPL-SCNC: 26 MMOL/L (ref 21–32)
CREAT BLD-MCNC: 1.1 MG/DL (ref 0.55–1.02)
DEPRECATED RDW RBC AUTO: 45.4 FL (ref 35.1–46.3)
DEPRECATED RDW RBC AUTO: 48.2 FL (ref 35.1–46.3)
EOSINOPHIL # BLD AUTO: 0.26 X10(3) UL (ref 0–0.7)
EOSINOPHIL NFR BLD AUTO: 3.5 %
ERYTHROCYTE [DISTWIDTH] IN BLOOD BY AUTOMATED COUNT: 14.6 % (ref 11–15)
ERYTHROCYTE [DISTWIDTH] IN BLOOD BY AUTOMATED COUNT: 14.9 % (ref 11–15)
GLOBULIN PLAS-MCNC: 3.7 G/DL (ref 2.8–4.4)
GLUCOSE BLD-MCNC: 92 MG/DL (ref 70–99)
HCT VFR BLD AUTO: 20.6 % (ref 35–48)
HCT VFR BLD AUTO: 22.1 % (ref 35–48)
HGB BLD-MCNC: 6.3 G/DL (ref 12–16)
HGB BLD-MCNC: 6.4 G/DL (ref 12–16)
IMM GRANULOCYTES # BLD AUTO: 0.1 X10(3) UL (ref 0–1)
IMM GRANULOCYTES NFR BLD: 1.3 %
LYMPHOCYTES # BLD AUTO: 2.44 X10(3) UL (ref 1–4)
LYMPHOCYTES NFR BLD AUTO: 32.4 %
M PROTEIN MFR SERPL ELPH: 7.1 G/DL (ref 6.4–8.2)
MCH RBC QN AUTO: 26 PG (ref 26–34)
MCH RBC QN AUTO: 26.3 PG (ref 26–34)
MCHC RBC AUTO-ENTMCNC: 29 G/DL (ref 31–37)
MCHC RBC AUTO-ENTMCNC: 30.6 G/DL (ref 31–37)
MCV RBC AUTO: 85.8 FL (ref 80–100)
MCV RBC AUTO: 89.8 FL (ref 80–100)
MONOCYTES # BLD AUTO: 0.62 X10(3) UL (ref 0.1–1)
MONOCYTES NFR BLD AUTO: 8.2 %
NEUTROPHILS # BLD AUTO: 4.06 X10 (3) UL (ref 1.5–7.7)
NEUTROPHILS # BLD AUTO: 4.06 X10(3) UL (ref 1.5–7.7)
NEUTROPHILS NFR BLD AUTO: 53.9 %
OSMOLALITY SERPL CALC.SUM OF ELEC: 287 MOSM/KG (ref 275–295)
PATIENT FASTING Y/N/NP: YES
PLATELET # BLD AUTO: 384 10(3)UL (ref 150–450)
PLATELET # BLD AUTO: 410 10(3)UL (ref 150–450)
POTASSIUM SERPL-SCNC: 4.6 MMOL/L (ref 3.5–5.1)
RBC # BLD AUTO: 2.4 X10(6)UL (ref 3.8–5.3)
RBC # BLD AUTO: 2.46 X10(6)UL (ref 3.8–5.3)
RH BLOOD TYPE: POSITIVE
SODIUM SERPL-SCNC: 139 MMOL/L (ref 136–145)
WBC # BLD AUTO: 6.4 X10(3) UL (ref 4–11)
WBC # BLD AUTO: 7.5 X10(3) UL (ref 4–11)

## 2020-03-21 PROCEDURE — 86920 COMPATIBILITY TEST SPIN: CPT

## 2020-03-21 PROCEDURE — 36415 COLL VENOUS BLD VENIPUNCTURE: CPT

## 2020-03-21 PROCEDURE — 85027 COMPLETE CBC AUTOMATED: CPT

## 2020-03-21 PROCEDURE — 99285 EMERGENCY DEPT VISIT HI MDM: CPT

## 2020-03-21 PROCEDURE — 86901 BLOOD TYPING SEROLOGIC RH(D): CPT | Performed by: EMERGENCY MEDICINE

## 2020-03-21 PROCEDURE — 81025 URINE PREGNANCY TEST: CPT

## 2020-03-21 PROCEDURE — 86850 RBC ANTIBODY SCREEN: CPT | Performed by: EMERGENCY MEDICINE

## 2020-03-21 PROCEDURE — 80053 COMPREHEN METABOLIC PANEL: CPT

## 2020-03-21 PROCEDURE — 85025 COMPLETE CBC W/AUTO DIFF WBC: CPT | Performed by: EMERGENCY MEDICINE

## 2020-03-21 PROCEDURE — 84703 CHORIONIC GONADOTROPIN ASSAY: CPT | Performed by: EMERGENCY MEDICINE

## 2020-03-21 PROCEDURE — 86900 BLOOD TYPING SEROLOGIC ABO: CPT | Performed by: EMERGENCY MEDICINE

## 2020-03-21 PROCEDURE — 36430 TRANSFUSION BLD/BLD COMPNT: CPT

## 2020-03-21 RX ORDER — TRANEXAMIC ACID 650 1/1
1300 TABLET ORAL 3 TIMES DAILY
Qty: 30 TABLET | Refills: 0 | Status: SHIPPED | OUTPATIENT
Start: 2020-03-21 | End: 2020-03-26

## 2020-03-22 VITALS
HEART RATE: 100 BPM | SYSTOLIC BLOOD PRESSURE: 111 MMHG | BODY MASS INDEX: 32 KG/M2 | WEIGHT: 179.88 LBS | OXYGEN SATURATION: 100 % | TEMPERATURE: 99 F | RESPIRATION RATE: 18 BRPM | DIASTOLIC BLOOD PRESSURE: 68 MMHG

## 2020-03-22 LAB — HCG SERPL QL: NEGATIVE

## 2020-03-22 NOTE — TELEPHONE ENCOUNTER
I received a phone call from the lab regarding a critical lab value on CBC of 6.4. I reviewed the patient's chart prior to phone call. She is  scheduled for D&C, hysteroscopy on 3/31/20 for thickened endometrium and menorrhagia. Has a h/o tamoxifen use. arrival.       Yanet Olvera, 03/21/20, 8:06 PM

## 2020-03-22 NOTE — H&P
[facility]  Obstetrics and Gynecology Consultation     Jorge Luiswendy John 13 Patient Status:  Emergency    10/16/1970 MRN [id number]   Location [address] [place] [address] Attending Rose Marie Price MD   Hospital Day 0 PCP Abbie Kenny, liver enzymes 11/16/2015   • Exposure to medical diagnostic radiation     last 2014   • Hearing impairment     70% hearing loss in left ear and some in right    • History of HPV infection 6/2/2015   • Incarcerated umbilical hernia 3/09/6534   • Insomnia 7/ Needs      Financial resource strain: Not on file      Food insecurity:        Worry: Not on file        Inability: Not on file      Transportation needs:        Medical: Not on file        Non-medical: Not on file    Tobacco Use      Smoking status: Forme Paternal Grandmother 72   • Diabetes Maternal Grandmother    • Breast Cancer Maternal Grandmother 72        estimated age   • Breast Cancer Maternal Aunt    • Glaucoma Mother    • Breast Cancer Self 45       ROS:  General: + fatigue  Cardiovascular:  dhruv them today. Reviewed bleeding precautions. Patient and her  voiced understanding and were given opportunities to ask questions.        Time spent on counseling/coordination of care:  30 Minutes    Total time spent with patient:  30 Minutes    Delle Aschoff

## 2020-03-22 NOTE — ED PROVIDER NOTES
Patient Seen in: [facility] Emergency Department      History   Patient presents with:  Abnormal Labs  Dyspnea BESSY SOB    Stated Complaint: hgb 6.6, BESSY    HPI  This is a 49-year-old female who is been having dysfunctional uterine bleeding for some OR   • BREAST SENTINEL LYMPH NODE BIOPSY Bilateral 6/2/2014    Performed by Ivan Kimball MD at [facility] MAIN OR   • CHOLECYSTECTOMY  03/28/2018   • HERNIA SURGERY  03/28/2018    ventral & umbilical hernia repair   • HERNIA UMBILICAL REPAIR ADULT N/A 3/28/2018 distress. The patient is not septic or toxic    HEENT: Atraumatic, conjunctiva are not pale. There is no icterus. Oral mucosa Is wet. No facial trauma. The neck is supple. LUNGS: Clear to auscultation, there is no wheezing or retraction.   No crackle ABORH (BLOOD TYPE)   ANTIBODY SCREEN   PREPARE RBC          The patient was placed on monitors, IV was started, blood was drawn. The patient's hemoglobin was 6.3. She was typed and screened. I discussed this case with her extensively.   The patient na

## 2020-03-23 ENCOUNTER — TELEPHONE (OUTPATIENT)
Dept: OBGYN CLINIC | Facility: CLINIC | Age: 50
End: 2020-03-23

## 2020-03-23 ENCOUNTER — ANESTHESIA EVENT (OUTPATIENT)
Dept: SURGERY | Facility: HOSPITAL | Age: 50
End: 2020-03-23
Payer: COMMERCIAL

## 2020-03-23 LAB — BLOOD TYPE BARCODE: 6200

## 2020-03-23 NOTE — PAT NURSING NOTE
Faxed abnormal Hematology report to Aleksey,surgeon to advise of low Hemoglobin and hematocrit results.

## 2020-03-23 NOTE — TELEPHONE ENCOUNTER
Please see if we can move case up to either Wednesday 3/25 after yaritza around 3:30 or  Thursday  3/26 at 9:30    Pt is bleeding and anemic

## 2020-03-24 ENCOUNTER — TELEPHONE (OUTPATIENT)
Dept: OBGYN CLINIC | Facility: CLINIC | Age: 50
End: 2020-03-24

## 2020-03-24 NOTE — TELEPHONE ENCOUNTER
Pt  returned call to the office.  Spoke with pt and scheduled surgery for 4/26/2020 at [facility] op   Future Appointments   Date Time Provider Byron Henson   4/13/2020 12:15 PM Trevon Price MD [facility] [address]     Orders entere

## 2020-03-24 NOTE — TELEPHONE ENCOUNTER
----- Message from Precious Najjar, MD sent at 3/21/2020 10:14 PM CDT -----  Regarding: Move up this patient's surgery? Hello all,  Do you think (if Dr. Edu Clark able) her surgery could be moved up this week?  Patient is s/p ED visit for anemia requiring t

## 2020-03-25 NOTE — H&P
[facility] Patient Status:  Hospital Outpatient Surgery    10/16/1970 MRN [id number]   [facility] SURGERY Attending Roshan Fitch MD   Hosp Day # 0 PCP Yasmine Quintero DO     SUBJECTIVE: anemic. She was then sent to the ER for evaluation. My partner examined her and found to have scant bleeding at that time. She was placed on lysteda and given 1 unit of blood and instructed on iron supplementation.     Her case was then moved to 3/26/2020 Surgical History:  Past Surgical History:   Procedure Laterality Date   • BREAST BIOPSY Left 6/27/2014    Performed by Jignesh Chandler MD at [facility] MAIN OR   • BREAST BIOPSY NEEDLE LOCALIZATION Left 6/2/2014    Performed by Jignesh Chandler MD at [address] [place] [address]   • B 1446    Physical Exam:  General: Alert, orientated x3. .  Vital Signs:  Height 63\", weight 180 lb (81.6 kg), last menstrual period 03/18/2020, not currently breastfeeding. . VSS Afebrile  HEENT: Exam is unremarkable. Without scleral icterus.   Mucous membr the endometrial canal.  This may be related to tamoxifen use. However given the overall appearance, an underlying lesion such as a polyp or mass is not able to   be excluded and continued follow-up and correlation with direct visualization is recommended.

## 2020-03-26 ENCOUNTER — HOSPITAL ENCOUNTER (OUTPATIENT)
Facility: HOSPITAL | Age: 50
Setting detail: HOSPITAL OUTPATIENT SURGERY
Discharge: HOME | End: 2020-03-26
Attending: OBSTETRICS & GYNECOLOGY | Admitting: OBSTETRICS & GYNECOLOGY
Payer: COMMERCIAL

## 2020-03-26 VITALS
TEMPERATURE: 98 F | HEART RATE: 90 BPM | DIASTOLIC BLOOD PRESSURE: 72 MMHG | OXYGEN SATURATION: 96 % | WEIGHT: 182.13 LBS | RESPIRATION RATE: 14 BRPM | SYSTOLIC BLOOD PRESSURE: 115 MMHG | BODY MASS INDEX: 32.27 KG/M2 | HEIGHT: 63 IN

## 2020-03-26 DIAGNOSIS — Z92.29 H/O TAMOXIFEN THERAPY: ICD-10-CM

## 2020-03-26 DIAGNOSIS — R93.89 THICKENED ENDOMETRIUM: Primary | ICD-10-CM

## 2020-03-26 DIAGNOSIS — Z85.3 PERSONAL HISTORY OF BREAST CANCER: ICD-10-CM

## 2020-03-26 LAB
POCT LOT NUMBER: NORMAL
POCT URINE PREGNANCY: NEGATIVE

## 2020-03-26 PROCEDURE — 58558 HYSTEROSCOPY BIOPSY: CPT | Performed by: OBSTETRICS & GYNECOLOGY

## 2020-03-26 PROCEDURE — 0UDB8ZZ EXTRACTION OF ENDOMETRIUM, VIA NATURAL OR ARTIFICIAL OPENING ENDOSCOPIC: ICD-10-PCS | Performed by: OBSTETRICS & GYNECOLOGY

## 2020-03-26 RX ORDER — DOXYCYCLINE HYCLATE 100 MG
100 TABLET ORAL 2 TIMES DAILY
Qty: 6 TABLET | Refills: 0 | Status: SHIPPED | OUTPATIENT
Start: 2020-03-26 | End: 2020-03-29

## 2020-03-26 RX ORDER — HYDROCODONE BITARTRATE AND ACETAMINOPHEN 5; 325 MG/1; MG/1
2 TABLET ORAL AS NEEDED
Status: DISCONTINUED | OUTPATIENT
Start: 2020-03-26 | End: 2020-03-26

## 2020-03-26 RX ORDER — NALOXONE HYDROCHLORIDE 0.4 MG/ML
80 INJECTION, SOLUTION INTRAMUSCULAR; INTRAVENOUS; SUBCUTANEOUS AS NEEDED
Status: DISCONTINUED | OUTPATIENT
Start: 2020-03-26 | End: 2020-03-26

## 2020-03-26 RX ORDER — HYDROCODONE BITARTRATE AND ACETAMINOPHEN 5; 325 MG/1; MG/1
1-2 TABLET ORAL EVERY 4 HOURS PRN
Qty: 10 TABLET | Refills: 0 | Status: SHIPPED | OUTPATIENT
Start: 2020-03-26 | End: 2020-04-13

## 2020-03-26 RX ORDER — HYDROCODONE BITARTRATE AND ACETAMINOPHEN 5; 325 MG/1; MG/1
1 TABLET ORAL AS NEEDED
Status: DISCONTINUED | OUTPATIENT
Start: 2020-03-26 | End: 2020-03-26

## 2020-03-26 RX ORDER — SODIUM CHLORIDE, SODIUM LACTATE, POTASSIUM CHLORIDE, CALCIUM CHLORIDE 600; 310; 30; 20 MG/100ML; MG/100ML; MG/100ML; MG/100ML
INJECTION, SOLUTION INTRAVENOUS CONTINUOUS
Status: DISCONTINUED | OUTPATIENT
Start: 2020-03-26 | End: 2020-03-26

## 2020-03-26 RX ORDER — HYDROMORPHONE HYDROCHLORIDE 1 MG/ML
0.4 INJECTION, SOLUTION INTRAMUSCULAR; INTRAVENOUS; SUBCUTANEOUS EVERY 5 MIN PRN
Status: DISCONTINUED | OUTPATIENT
Start: 2020-03-26 | End: 2020-03-26

## 2020-03-26 RX ORDER — DEXAMETHASONE SODIUM PHOSPHATE 4 MG/ML
VIAL (ML) INJECTION AS NEEDED
Status: DISCONTINUED | OUTPATIENT
Start: 2020-03-26 | End: 2020-03-26 | Stop reason: SURG

## 2020-03-26 RX ORDER — ONDANSETRON 2 MG/ML
INJECTION INTRAMUSCULAR; INTRAVENOUS AS NEEDED
Status: DISCONTINUED | OUTPATIENT
Start: 2020-03-26 | End: 2020-03-26 | Stop reason: SURG

## 2020-03-26 RX ORDER — LIDOCAINE HYDROCHLORIDE 10 MG/ML
INJECTION, SOLUTION EPIDURAL; INFILTRATION; INTRACAUDAL; PERINEURAL AS NEEDED
Status: DISCONTINUED | OUTPATIENT
Start: 2020-03-26 | End: 2020-03-26 | Stop reason: SURG

## 2020-03-26 RX ADMIN — SODIUM CHLORIDE, SODIUM LACTATE, POTASSIUM CHLORIDE, CALCIUM CHLORIDE: 600; 310; 30; 20 INJECTION, SOLUTION INTRAVENOUS at 10:16:00

## 2020-03-26 RX ADMIN — DEXAMETHASONE SODIUM PHOSPHATE 8 MG: 4 MG/ML VIAL (ML) INJECTION at 09:59:00

## 2020-03-26 RX ADMIN — LIDOCAINE HYDROCHLORIDE 50 MG: 10 INJECTION, SOLUTION EPIDURAL; INFILTRATION; INTRACAUDAL; PERINEURAL at 09:52:00

## 2020-03-26 RX ADMIN — ONDANSETRON 4 MG: 2 INJECTION INTRAMUSCULAR; INTRAVENOUS at 09:59:00

## 2020-03-26 NOTE — BRIEF OP NOTE
Pre-Operative Diagnosis: Personal history of breast cancer [Z85.3]  Thickened endometrium [R93.89]  H/O tamoxifen therapy [Z92.29]     Post-Operative Diagnosis: Personal history of breast cancer [Z85. 3]Thickened endometrium [R93.89]H/O tamoxifen therapy [Z

## 2020-03-26 NOTE — ANESTHESIA PREPROCEDURE EVALUATION
PRE-OP EVALUATION    Patient Name: Vianney Wyatt    Pre-op Diagnosis: Personal history of breast cancer [Z85.3]  Thickened endometrium [R93.89]  H/O tamoxifen therapy [Z92.29]    Procedure(s):  diagnostic hysteroscopy, dilation and curettage    Surgeon(s NEEDLE LOCALIZATION Left 6/2/2014    Performed by Jignesh Chandler MD at [facility] MAIN OR   • BREAST SENTINEL LYMPH NODE BIOPSY Bilateral 6/2/2014    Performed by Jignesh Chandler MD at [facility] MAIN OR   • CHOLECYSTECTOMY  03/28/2018   • HERNIA SURGERY  03/28/2018    falguni regular  Rate: normal     Dental  Comment: No gross abnormalities noted on exam.            Pulmonary    Pulmonary exam normal.                 Other findings            ASA: 2   Plan: general  NPO status verified and patient meets guidelines.     Post-proc

## 2020-03-26 NOTE — ANESTHESIA PROCEDURE NOTES
Airway  Date/Time: 3/26/2020 10:02 AM  Urgency: elective    Airway not difficult    General Information and Staff    Patient location during procedure: OR  Anesthesiologist: Gilma Garcia MD  Performed: anesthesiologist     Indications and Patient Conditio

## 2020-03-26 NOTE — ANESTHESIA POSTPROCEDURE EVALUATION
[address] Patient Status:  Hospital Outpatient Surgery   Age/Gender 44year old female MRN [id number]   [facility] SURGERY Attending Norbert Cook MD   Hosp Day # 0 PCP Kip Banks DO       Anesthesia Post-op

## 2020-03-26 NOTE — OPERATIVE REPORT
[facility]  Post-Operative Note    Juwan Lopez 13 Patient Status:  Hospital Outpatient Surgery    10/16/1970 MRN [id number]   [facility] SURGERY Attending Amanda Rodríguez MD   Hosp Day # 0 PCP Alondra Mora DO     Preoperative D

## 2020-03-26 NOTE — INTERVAL H&P NOTE
Pre-op Diagnosis: Personal history of breast cancer [Z85.3]  Thickened endometrium [R93.89]  H/O tamoxifen therapy [Z92.29]    The above referenced H&P was reviewed by Ric Gross MD on 3/26/2020, the patient was examined and no significant changes h

## 2020-03-31 ENCOUNTER — ANESTHESIA (OUTPATIENT)
Dept: SURGERY | Facility: HOSPITAL | Age: 50
End: 2020-03-31
Payer: COMMERCIAL

## 2020-04-13 ENCOUNTER — TELEPHONE (OUTPATIENT)
Dept: HEMATOLOGY/ONCOLOGY | Facility: HOSPITAL | Age: 50
End: 2020-04-13

## 2020-04-13 ENCOUNTER — OFFICE VISIT (OUTPATIENT)
Dept: OBGYN CLINIC | Facility: CLINIC | Age: 50
End: 2020-04-13
Payer: COMMERCIAL

## 2020-04-13 VITALS
DIASTOLIC BLOOD PRESSURE: 72 MMHG | HEIGHT: 64 IN | WEIGHT: 181.63 LBS | SYSTOLIC BLOOD PRESSURE: 122 MMHG | BODY MASS INDEX: 31.01 KG/M2 | HEART RATE: 103 BPM | TEMPERATURE: 99 F

## 2020-04-13 DIAGNOSIS — Z98.890 POST-OPERATIVE STATE: Primary | ICD-10-CM

## 2020-04-13 DIAGNOSIS — R93.89 THICKENED ENDOMETRIUM: ICD-10-CM

## 2020-04-13 DIAGNOSIS — Z92.29 H/O TAMOXIFEN THERAPY: ICD-10-CM

## 2020-04-13 NOTE — PROGRESS NOTES
Post op visit    No bleeding currently    Will observe    Pathology and procedure reviewed with pt    Repeat u/s in july

## 2020-04-13 NOTE — TELEPHONE ENCOUNTER
Cheryl Rodrigues called requesting a refill on Xanax prescribed in December when she was in [place] which the script cannot be transferred back to [place]. Please send to [facility] in [place] her usual pharmacy, if any problems please call her.     I contacted the patient, a

## 2020-09-21 NOTE — PROGRESS NOTES
Alida Oswald is a 44year old female. HPI:     Depression and anxiety:  Mood worsening since being off of Effexor. Mood also worse since having issues with her daughter who has severe untreated and uncontrolled bipolar disorder.   Patient states raeann times daily as needed for Sleep. (Patient taking differently: Take 1 mg by mouth as needed for Sleep.  ) 90 tablet 3   • Ascorbic Acid (VITAMIN C) 100 MG Oral Tab Take 100 mg by mouth daily.      • Vitamin D3 (VITAMIN D3) 1000 UNITS Oral Tab Take 2,000 Unit Performed by Gerry Marina MD at [facility] MAIN OR   • LUMPECTOMY LEFT Left 06/02/2014    Invasive ductal CA   • LUMPECTOMY RIGHT      right breast- benign   • WILFREDO LOCALIZATION WIRE 1 SITE RIGHT (CKM=05852) Right 6/2014    Fibroadenoma   • NEEDLE BIOPSY LEFT Lef LMP 09/09/2020 (Approximate)   SpO2 98%   BMI 29.97 kg/m²   GENERAL: NAD, pleasant well-appearing overweight  female  SKIN: no visible rashes  HEAD: NCAT  NECK: supple, no adenopathy, no thyromegaly, no masses  LUNGS: CTA A/P no wheezes/ronchi/ra (VIIBRYD) 40 MG Oral Tab 30 tablet 1     Sig: Take 1 tablet (40 mg total) by mouth daily. Imaging & Consults:  INFLUENZA REFUSED [facility] NAVIGATOR    Return in about 4 weeks (around 10/19/2020) for Depression/Anxiety, and Wellness Visit. [facility]

## 2020-09-22 ENCOUNTER — TELEPHONE (OUTPATIENT)
Dept: FAMILY MEDICINE CLINIC | Facility: CLINIC | Age: 50
End: 2020-09-22

## 2020-09-22 ENCOUNTER — OFFICE VISIT (OUTPATIENT)
Dept: PODIATRY CLINIC | Facility: CLINIC | Age: 50
End: 2020-09-22
Payer: COMMERCIAL

## 2020-09-22 DIAGNOSIS — S93.602A: ICD-10-CM

## 2020-09-22 DIAGNOSIS — M79.671 RIGHT FOOT PAIN: Primary | ICD-10-CM

## 2020-09-22 PROCEDURE — 99203 OFFICE O/P NEW LOW 30 MIN: CPT | Performed by: PODIATRIST

## 2020-09-22 NOTE — TELEPHONE ENCOUNTER
Dinh Son Dr. Jha Course,     On 9/22, the following referrals for therapy were provided to the patient:     Rupinder Banks, Psychologist,  [address], Therapist, [facility] Cathy   [facility]

## 2020-09-22 NOTE — PROGRESS NOTES
Carin Barboza is a 44year old female.  Patient presents with:  New Patient: On Memorial day patient drop a step stool on middle toe right foot - ever since then she is  unable to walk more  than 3 hrs a day - does wear a foot brace - pain scale 2/10 now, umbilical hernia 8/36/3289   • Insomnia 7/20/2015   • Normal delivery 2-    child birth   • Normal delivery 23-    child birth   • Overweight (BMI 25.0-29.9) 2/12/2018   • Ventral incisional hernia without obstruction or gangrene 4/3/2018   • name: Not on file      Number of children: 2      Years of education: Not on file      Highest education level: Not on file    Occupational History      Occupation:  for [employer]        Employer: [employer]     Tobacco Use VIEWS), RIGHT   (CPT=73630)  -     PHYSICAL THERAPY EXTERNAL    Sprain of foot joint, left, initial encounter  -     PHYSICAL THERAPY EXTERNAL        Plan:  At this time we reviewed her x-rays there is no evidence of any obvious fracture of the second toe o

## 2020-09-29 PROBLEM — F33.1 MAJOR DEPRESSIVE DISORDER, RECURRENT EPISODE, MODERATE WITH ANXIOUS DISTRESS (HCC): Status: ACTIVE | Noted: 2020-09-29

## 2020-10-21 ENCOUNTER — OFFICE VISIT (OUTPATIENT)
Dept: FAMILY MEDICINE CLINIC | Facility: CLINIC | Age: 50
End: 2020-10-21
Payer: COMMERCIAL

## 2020-10-21 VITALS
DIASTOLIC BLOOD PRESSURE: 70 MMHG | RESPIRATION RATE: 16 BRPM | WEIGHT: 174 LBS | TEMPERATURE: 97 F | HEART RATE: 78 BPM | BODY MASS INDEX: 29.71 KG/M2 | HEIGHT: 64 IN | SYSTOLIC BLOOD PRESSURE: 122 MMHG

## 2020-10-21 DIAGNOSIS — Z12.11 SCREENING FOR MALIGNANT NEOPLASM OF COLON: ICD-10-CM

## 2020-10-21 DIAGNOSIS — F33.1 MAJOR DEPRESSIVE DISORDER, RECURRENT EPISODE, MODERATE WITH ANXIOUS DISTRESS (HCC): ICD-10-CM

## 2020-10-21 DIAGNOSIS — C50.412 PRIMARY MALIGNANT NEOPLASM OF UPPER OUTER QUADRANT OF LEFT FEMALE BREAST (HCC): ICD-10-CM

## 2020-10-21 DIAGNOSIS — D50.0 IRON DEFICIENCY ANEMIA DUE TO CHRONIC BLOOD LOSS: ICD-10-CM

## 2020-10-21 DIAGNOSIS — Z23 NEED FOR VACCINATION: ICD-10-CM

## 2020-10-21 DIAGNOSIS — Z00.00 ROUTINE GENERAL MEDICAL EXAMINATION AT A HEALTH CARE FACILITY: Primary | ICD-10-CM

## 2020-10-21 PROCEDURE — 3008F BODY MASS INDEX DOCD: CPT | Performed by: FAMILY MEDICINE

## 2020-10-21 PROCEDURE — 99396 PREV VISIT EST AGE 40-64: CPT | Performed by: FAMILY MEDICINE

## 2020-10-21 PROCEDURE — 3074F SYST BP LT 130 MM HG: CPT | Performed by: FAMILY MEDICINE

## 2020-10-21 PROCEDURE — 99214 OFFICE O/P EST MOD 30 MIN: CPT | Performed by: FAMILY MEDICINE

## 2020-10-21 PROCEDURE — 3078F DIAST BP <80 MM HG: CPT | Performed by: FAMILY MEDICINE

## 2020-10-21 PROCEDURE — 90686 IIV4 VACC NO PRSV 0.5 ML IM: CPT | Performed by: FAMILY MEDICINE

## 2020-10-21 PROCEDURE — 90471 IMMUNIZATION ADMIN: CPT | Performed by: FAMILY MEDICINE

## 2020-10-21 RX ORDER — VILAZODONE HYDROCHLORIDE 40 MG/1
40 TABLET ORAL DAILY
Qty: 30 TABLET | Refills: 1 | Status: SHIPPED | OUTPATIENT
Start: 2020-10-21 | End: 2020-12-02

## 2020-10-21 RX ORDER — ALPRAZOLAM 0.5 MG/1
0.5 TABLET ORAL NIGHTLY PRN
Qty: 90 TABLET | Refills: 0 | Status: SHIPPED | OUTPATIENT
Start: 2020-10-21 | End: 2021-10-11

## 2020-10-21 NOTE — PROGRESS NOTES
HPI:   Venkata Bañuelos is a 44year old female   Symptoms: denies discharge, itching, burning or dysuria.    Last PAP: Up-to-date  Last colonoscopy: Not applicable      Depression and anxiety:  Patient was prescribed Viibryd on 9/21/2020, we had her start w 2,000 Units by mouth daily. • Nutritional Supplements (ANTIOXIDANTS OR) Take  by mouth.  Take as directed        Past Medical History:   Diagnosis Date   • Breast cancer ([facility] [address] 5/2014    Invasive ductal CA// taking Tamoxifen until 08/2019 x5 years   • Left 5/2014    US guided biopsy - [facility]   • OTHER  03/2020    dilation and curettage    • OTHER SURGICAL HISTORY  age 13    right arm surgery   • OTHER SURGICAL HISTORY  age 1    dental work under anesthesia   • RADIATION LEFT  2014   • TUBAL LIGATION  10-19 122/70   Pulse 78   Temp 97.2 °F (36.2 °C) (Temporal)   Resp 16   Ht 64\"   Wt 174 lb (78.9 kg)   LMP 09/09/2020 (Approximate)   BMI 29.87 kg/m²   Body mass index is 29.87 kg/m².    GENERAL: NAD, Pleasant overweight  female female  SKIN: no rashes, 1.50 - 7.70 x10(3) uL 4.06    Lymphocytes Absolute      1.00 - 4.00 x10(3) uL 2.44    Monocytes Absolute      0.10 - 1.00 x10(3) uL 0.62    Eosinophils Absolute      0.00 - 0.70 x10(3) uL 0.26    Basophils Absolute      0.00 - 0.20 x10(3) uL 0.05    Sherry 25OH, Total      30.0 - 100.0 ng/mL 56.5 34.2          ASSESSMENT AND PLAN:   Jeny Michele is a 44year old female who presents for a complete physical exam.        Routine general medical examination at a health care facility  (primary encounter diagno Future  - VITAMIN B12; Future  - ALPRAZolam 0.5 MG Oral Tab; Take 1 tablet (0.5 mg total) by mouth nightly as needed for Sleep or Anxiety. Dispense: 90 tablet; Refill: 0    5.  Primary malignant neoplasm of upper outer quadrant of left female breast ([facility] [address] for physical and mental health concerns.

## 2020-10-25 PROBLEM — D50.0 IRON DEFICIENCY ANEMIA DUE TO CHRONIC BLOOD LOSS: Status: ACTIVE | Noted: 2020-10-25

## 2020-12-02 DIAGNOSIS — F33.1 MAJOR DEPRESSIVE DISORDER, RECURRENT EPISODE, MODERATE WITH ANXIOUS DISTRESS (HCC): ICD-10-CM

## 2020-12-02 RX ORDER — VILAZODONE HYDROCHLORIDE 40 MG/1
TABLET ORAL
Qty: 30 TABLET | Refills: 0 | Status: SHIPPED | OUTPATIENT
Start: 2020-12-02 | End: 2020-12-21

## 2020-12-02 NOTE — TELEPHONE ENCOUNTER
Requested Prescriptions     Pending Prescriptions Disp Refills   • VIIBRYD 40 MG Oral Tab [Pharmacy Med Name: VIIBRYD 40 MG TABLET] 30 tablet 1     Sig: TAKE 1 TABLET BY MOUTH EVERY DAY     Last fill was 10/21/20 30 1 refill  Last OV 10/21/20  Future OV 12

## 2020-12-04 ENCOUNTER — TELEPHONE (OUTPATIENT)
Dept: HEMATOLOGY/ONCOLOGY | Facility: HOSPITAL | Age: 50
End: 2020-12-04

## 2020-12-04 DIAGNOSIS — C50.412 PRIMARY MALIGNANT NEOPLASM OF UPPER OUTER QUADRANT OF LEFT FEMALE BREAST (HCC): Primary | ICD-10-CM

## 2020-12-04 NOTE — TELEPHONE ENCOUNTER
Gisele Oropeza called asking for her mammogram order to be places so she can schedule that and a f/u with Tommy Al.

## 2020-12-04 NOTE — TELEPHONE ENCOUNTER
LVM to let her know the order for mammogram is in the system.    Can schedule by calling [phone]

## 2020-12-08 ENCOUNTER — LAB ENCOUNTER (OUTPATIENT)
Dept: LAB | Age: 50
End: 2020-12-08
Attending: FAMILY MEDICINE
Payer: COMMERCIAL

## 2020-12-08 ENCOUNTER — HOSPITAL ENCOUNTER (OUTPATIENT)
Dept: ULTRASOUND IMAGING | Age: 50
Discharge: HOME | End: 2020-12-08
Attending: OBSTETRICS & GYNECOLOGY
Payer: COMMERCIAL

## 2020-12-08 ENCOUNTER — HOSPITAL ENCOUNTER (OUTPATIENT)
Dept: MAMMOGRAPHY | Age: 50
Discharge: HOME | End: 2020-12-08
Attending: INTERNAL MEDICINE
Payer: COMMERCIAL

## 2020-12-08 DIAGNOSIS — Z00.00 ROUTINE GENERAL MEDICAL EXAMINATION AT A HEALTH CARE FACILITY: ICD-10-CM

## 2020-12-08 DIAGNOSIS — Z92.29 H/O TAMOXIFEN THERAPY: ICD-10-CM

## 2020-12-08 DIAGNOSIS — F33.1 MAJOR DEPRESSIVE DISORDER, RECURRENT EPISODE, MODERATE WITH ANXIOUS DISTRESS (HCC): ICD-10-CM

## 2020-12-08 DIAGNOSIS — R93.89 THICKENED ENDOMETRIUM: ICD-10-CM

## 2020-12-08 DIAGNOSIS — C50.412 PRIMARY MALIGNANT NEOPLASM OF UPPER OUTER QUADRANT OF LEFT FEMALE BREAST (HCC): ICD-10-CM

## 2020-12-08 DIAGNOSIS — D50.0 IRON DEFICIENCY ANEMIA DUE TO CHRONIC BLOOD LOSS: ICD-10-CM

## 2020-12-08 PROCEDURE — 84443 ASSAY THYROID STIM HORMONE: CPT

## 2020-12-08 PROCEDURE — 80053 COMPREHEN METABOLIC PANEL: CPT

## 2020-12-08 PROCEDURE — 36415 COLL VENOUS BLD VENIPUNCTURE: CPT

## 2020-12-08 PROCEDURE — 77066 DX MAMMO INCL CAD BI: CPT | Performed by: INTERNAL MEDICINE

## 2020-12-08 PROCEDURE — 82607 VITAMIN B-12: CPT

## 2020-12-08 PROCEDURE — 85025 COMPLETE CBC W/AUTO DIFF WBC: CPT

## 2020-12-08 PROCEDURE — 83540 ASSAY OF IRON: CPT

## 2020-12-08 PROCEDURE — 82728 ASSAY OF FERRITIN: CPT

## 2020-12-08 PROCEDURE — 84439 ASSAY OF FREE THYROXINE: CPT

## 2020-12-08 PROCEDURE — 76642 ULTRASOUND BREAST LIMITED: CPT | Performed by: OBSTETRICS & GYNECOLOGY

## 2020-12-08 PROCEDURE — 82306 VITAMIN D 25 HYDROXY: CPT

## 2020-12-08 PROCEDURE — 77062 BREAST TOMOSYNTHESIS BI: CPT | Performed by: INTERNAL MEDICINE

## 2020-12-08 PROCEDURE — 83550 IRON BINDING TEST: CPT

## 2020-12-08 PROCEDURE — 80061 LIPID PANEL: CPT

## 2020-12-21 NOTE — PROGRESS NOTES
Melvin Das is a 44year old female. HPI:       Depression:  Pt states the Viibryd has been effective in treating her depression. However, insurance will not cover Viibryd starting January 1, 2021. Still having difficulty sleeping.   Sleeping 6 ho • History of HPV infection 6/2/2015   • Incarcerated umbilical hernia 0/79/2989   • Insomnia 7/20/2015   • Normal delivery 2-    child birth   • Normal delivery 90-    child birth   • Overweight (BMI 25.0-29.9) 2/12/2018   • Ventral incisio Family History   Problem Relation Age of Onset   • Cancer Paternal Grandmother         breast   • Breast Cancer Paternal Grandmother 72   • Diabetes Maternal Grandmother    • Breast Cancer Maternal Grandmother 72        estimated age   • Breast Cancer MCV      80.0 - 100.0 fL  90.3 85.8 89.8   MCH      26.0 - 34.0 pg  28.0 26.3 26.0   MCHC      31.0 - 37.0 g/dL  31.0 30.6 (L) 29.0 (L)   RDW      11.0 - 15.0 %  13.6 14.6 14.9   RDW-SD      35.1 - 46.3 fL  44.4 45.4 48.2 (H)   Prelim Neutrophil Abs Triglycerides      30 - 149 mg/dL  111     LDL Cholesterol Calc      <100 mg/dL  100 (H)     VLDL      0 - 30 mg/dL  22     NON HDL CHOL      <130 mg/dL  122     Iron, Serum      50 - 170 ug/dL  32 (L)     Transferrin      200 - 360 mg/dL  293     Iron B 1          Orders Placed This Encounter      Iron And Tibc [E]      Ferritin [E]      Meds & Refills for this Visit:  Requested Prescriptions     Signed Prescriptions Disp Refills   • escitalopram 5 MG Oral Tab 30 tablet 1     Sig: Take 1 tablet (5 mg tota

## 2020-12-21 NOTE — PATIENT INSTRUCTIONS
-Wean off the Viibryd using the starter pack in reverse.   After weaning off the Viibryd start the Lexapro.    -Schedule your appointment for follow-up on depression and use of Lexapro after you have been on the Lexapro for approximately 4 w

## 2021-01-19 DIAGNOSIS — F33.1 MAJOR DEPRESSIVE DISORDER, RECURRENT EPISODE, MODERATE WITH ANXIOUS DISTRESS (HCC): ICD-10-CM

## 2021-01-19 DIAGNOSIS — Z76.89 ENCOUNTER FOR NEW MEDICATION PRESCRIPTION: ICD-10-CM

## 2021-01-19 NOTE — TELEPHONE ENCOUNTER
Pt has an upcoming appt on 2/11 but will be out of her escitalopram 5 MG before then and would it refilled before she runs out to her [facility] on file.

## 2021-01-19 NOTE — TELEPHONE ENCOUNTER
Patient has refill available. LMOM to call pharmacy and request refill. Last fill was 12/21/20 30 tabs with 1 refill.

## 2021-01-29 RX ORDER — ESCITALOPRAM OXALATE 5 MG/1
5 TABLET ORAL DAILY
Qty: 90 TABLET | Refills: 0 | Status: SHIPPED | OUTPATIENT
Start: 2021-01-29 | End: 2021-02-11

## 2021-01-29 NOTE — TELEPHONE ENCOUNTER
Percy Gonzalez called her pharmacy, they told her to call the office and have a nurse call to get approval to refill her Escitalopram..

## 2021-02-04 ENCOUNTER — LAB ENCOUNTER (OUTPATIENT)
Dept: LAB | Age: 51
End: 2021-02-04
Attending: FAMILY MEDICINE
Payer: COMMERCIAL

## 2021-02-04 DIAGNOSIS — D50.0 IRON DEFICIENCY ANEMIA DUE TO CHRONIC BLOOD LOSS: ICD-10-CM

## 2021-02-04 LAB
DEPRECATED HBV CORE AB SER IA-ACNC: 11.3 NG/ML
IRON SATURATION: 22 %
IRON SERPL-MCNC: 99 UG/DL
TOTAL IRON BINDING CAPACITY: 450 UG/DL (ref 240–450)
TRANSFERRIN SERPL-MCNC: 302 MG/DL (ref 200–360)

## 2021-02-04 PROCEDURE — 36415 COLL VENOUS BLD VENIPUNCTURE: CPT

## 2021-02-04 PROCEDURE — 83540 ASSAY OF IRON: CPT

## 2021-02-04 PROCEDURE — 82728 ASSAY OF FERRITIN: CPT

## 2021-02-04 PROCEDURE — 83550 IRON BINDING TEST: CPT

## 2021-02-11 ENCOUNTER — OFFICE VISIT (OUTPATIENT)
Dept: FAMILY MEDICINE CLINIC | Facility: CLINIC | Age: 51
End: 2021-02-11
Payer: COMMERCIAL

## 2021-02-11 VITALS
TEMPERATURE: 98 F | SYSTOLIC BLOOD PRESSURE: 120 MMHG | DIASTOLIC BLOOD PRESSURE: 68 MMHG | BODY MASS INDEX: 29.77 KG/M2 | HEIGHT: 64 IN | HEART RATE: 88 BPM | WEIGHT: 174.38 LBS | OXYGEN SATURATION: 98 %

## 2021-02-11 DIAGNOSIS — F33.1 MAJOR DEPRESSIVE DISORDER, RECURRENT EPISODE, MODERATE WITH ANXIOUS DISTRESS (HCC): ICD-10-CM

## 2021-02-11 DIAGNOSIS — D50.0 IRON DEFICIENCY ANEMIA DUE TO CHRONIC BLOOD LOSS: ICD-10-CM

## 2021-02-11 DIAGNOSIS — G47.26 CIRCADIAN RHYTHM SLEEP DISORDER, SHIFT WORK TYPE: ICD-10-CM

## 2021-02-11 DIAGNOSIS — E66.3 OVERWEIGHT WITH BODY MASS INDEX (BMI) OF 29 TO 29.9 IN ADULT: ICD-10-CM

## 2021-02-11 DIAGNOSIS — Z51.81 THERAPEUTIC DRUG MONITORING: Primary | ICD-10-CM

## 2021-02-11 DIAGNOSIS — Z79.899 ENCOUNTER FOR LONG-TERM CURRENT USE OF MEDICATION: ICD-10-CM

## 2021-02-11 PROBLEM — E66.09 CLASS 1 OBESITY DUE TO EXCESS CALORIES WITHOUT SERIOUS COMORBIDITY WITH BODY MASS INDEX (BMI) OF 31.0 TO 31.9 IN ADULT: Status: RESOLVED | Noted: 2019-07-15 | Resolved: 2021-02-11

## 2021-02-11 PROBLEM — E66.811 CLASS 1 OBESITY DUE TO EXCESS CALORIES WITHOUT SERIOUS COMORBIDITY WITH BODY MASS INDEX (BMI) OF 31.0 TO 31.9 IN ADULT: Status: RESOLVED | Noted: 2019-07-15 | Resolved: 2021-02-11

## 2021-02-11 PROCEDURE — 3008F BODY MASS INDEX DOCD: CPT | Performed by: FAMILY MEDICINE

## 2021-02-11 PROCEDURE — 99214 OFFICE O/P EST MOD 30 MIN: CPT | Performed by: FAMILY MEDICINE

## 2021-02-11 PROCEDURE — 3078F DIAST BP <80 MM HG: CPT | Performed by: FAMILY MEDICINE

## 2021-02-11 PROCEDURE — 3074F SYST BP LT 130 MM HG: CPT | Performed by: FAMILY MEDICINE

## 2021-02-11 RX ORDER — ESCITALOPRAM OXALATE 5 MG/1
5 TABLET ORAL DAILY
Qty: 90 TABLET | Refills: 0 | Status: SHIPPED | OUTPATIENT
Start: 2021-02-11 | End: 2021-07-14 | Stop reason: DRUGHIGH

## 2021-02-11 NOTE — PROGRESS NOTES
Annabel Jay is a 44year old female. HPI:       Sleep disorder:  Working 4 over nights a week, midnight to 8 am.  She does keep the room she sleeps in as dark as possible.   Tinnitus also affects her sleep, it is less intense when she gets better sl 11/16/2015   • Exposure to medical diagnostic radiation     last 2014   • Hearing impairment     70% hearing loss in left ear and some in right    • History of HPV infection 6/2/2015   • Incarcerated umbilical hernia 3/89/1046   • Insomnia 7/20/2015   • Ir years: 13        Quit date: 3/1/2003        Years since quittin.9      Smokeless tobacco: Never Used    Alcohol use:  Yes      Alcohol/week: 0.0 standard drinks      Comment: rarely    Drug use: No        Family History   Problem Relation Age of Onset (primary encounter diagnosis)  Major depressive disorder, recurrent episode, moderate with anxious distress (hcc)  Iron deficiency anemia due to chronic blood loss  Circadian rhythm sleep disorder, shift work type  Overweight with body mass index (bmi) of

## 2021-02-11 NOTE — PATIENT INSTRUCTIONS
-Stop taking the iron supplement.    -Continue to take the generic Lexapro every day, avoid missing doses. -You can continue the vitamin C supplement and vitamin D supplement.

## 2021-04-06 ENCOUNTER — OFFICE VISIT (OUTPATIENT)
Dept: SURGERY | Facility: CLINIC | Age: 51
End: 2021-04-06
Payer: COMMERCIAL

## 2021-04-06 VITALS — DIASTOLIC BLOOD PRESSURE: 72 MMHG | TEMPERATURE: 98 F | SYSTOLIC BLOOD PRESSURE: 109 MMHG | HEART RATE: 79 BPM

## 2021-04-06 DIAGNOSIS — Z12.11 ENCOUNTER FOR SCREENING COLONOSCOPY: Primary | ICD-10-CM

## 2021-04-06 PROCEDURE — 3078F DIAST BP <80 MM HG: CPT | Performed by: COLON & RECTAL SURGERY

## 2021-04-06 PROCEDURE — S0285 CNSLT BEFORE SCREEN COLONOSC: HCPCS | Performed by: COLON & RECTAL SURGERY

## 2021-04-06 PROCEDURE — 3074F SYST BP LT 130 MM HG: CPT | Performed by: COLON & RECTAL SURGERY

## 2021-04-06 RX ORDER — SODIUM, POTASSIUM,MAG SULFATES 17.5-3.13G
SOLUTION, RECONSTITUTED, ORAL ORAL
Qty: 1 KIT | Refills: 0 | Status: SHIPPED | OUTPATIENT
Start: 2021-04-06 | End: 2021-05-27

## 2021-04-06 NOTE — PATIENT INSTRUCTIONS
This patient is 44years old, she presents for screening colonoscopy. She has not had a previous colonoscopy examination. She has no bright red blood per rectum or melena. She has no mucus in the stool or narrowing of the stool.     She has not had a

## 2021-04-06 NOTE — H&P
New Patient Visit Note       Active Problems      1. Encounter for screening colonoscopy        Chief Complaint   Patient presents with:  Colonoscopy: NP - CSCOPE CONSULT -- 1st cscope, denies GI issues. Denies family hx colon or rectal ca.        History o 1 obesity due to excess calories without serious comorbidity with body mass index (BMI) of 31.0 to 31.9 in adult 7/15/2019   • Depression    • Diarrhea     since 03/17/20/ advised to notify surgeon or Pcp- 03/18/20   • Disorder of liver     SOTO  fatty unique HISTORY  age 1    dental work under anesthesia   • RADIATION LEFT  2014   • TUBAL LIGATION  10-    [place], [place]       The family history and social history have been reviewed by me today.     Family History   Problem Relation Age Communication with Friends and Family:       Frequency of Social Gatherings with Friends and Family:       Attends [employer] Services:       Active Member of Clubs or Organizations:       Attends Club or Organization Meetings:       Marital Status:   Intim frequency and urgency. Musculoskeletal: Negative for arthralgias and myalgias. Skin: Negative for color change and rash. Neurological: Negative for tremors, syncope and weakness. Hematological: Negative for adenopathy. Does not bruise/bleed easily. superficial or deep cervical adenopathy. Left cervical: No superficial or deep cervical adenopathy. Upper Body:      Right upper body: No supraclavicular adenopathy. Left upper body: No supraclavicular adenopathy.    Neurological:      Mental

## 2021-04-07 ENCOUNTER — TELEPHONE (OUTPATIENT)
Dept: SURGERY | Facility: CLINIC | Age: 51
End: 2021-04-07

## 2021-04-07 RX ORDER — POLYETHYLENE GLYCOL 3350, SODIUM CHLORIDE, SODIUM BICARBONATE, POTASSIUM CHLORIDE 420; 11.2; 5.72; 1.48 G/4L; G/4L; G/4L; G/4L
POWDER, FOR SOLUTION ORAL
Qty: 1 BOTTLE | Refills: 0 | Status: SHIPPED | OUTPATIENT
Start: 2021-04-07 | End: 2021-05-27

## 2021-04-07 NOTE — TELEPHONE ENCOUNTER
PT. Was contacted after I spoke with the pharmacist. The pharmacist at [facility] informed me that all the preps are on backorder currently and the date of arrival is unknown at this time.  Pt.'s insurance will not cover the Suprep so it would be an out of pocket

## 2021-06-11 ENCOUNTER — PATIENT OUTREACH (OUTPATIENT)
Dept: SURGERY | Facility: CLINIC | Age: 51
End: 2021-06-11

## 2021-07-13 PROBLEM — K57.30 DIVERTICULOSIS OF COLON: Status: ACTIVE | Noted: 2021-07-13

## 2021-07-14 ENCOUNTER — OFFICE VISIT (OUTPATIENT)
Dept: FAMILY MEDICINE CLINIC | Facility: CLINIC | Age: 51
End: 2021-07-14
Payer: COMMERCIAL

## 2021-07-14 DIAGNOSIS — F33.1 MAJOR DEPRESSIVE DISORDER, RECURRENT EPISODE, MODERATE WITH ANXIOUS DISTRESS (HCC): ICD-10-CM

## 2021-07-14 DIAGNOSIS — Z51.81 THERAPEUTIC DRUG MONITORING: Primary | ICD-10-CM

## 2021-07-14 DIAGNOSIS — R10.11 RIGHT UPPER QUADRANT PAIN: ICD-10-CM

## 2021-07-14 PROCEDURE — 3074F SYST BP LT 130 MM HG: CPT | Performed by: FAMILY MEDICINE

## 2021-07-14 PROCEDURE — 3078F DIAST BP <80 MM HG: CPT | Performed by: FAMILY MEDICINE

## 2021-07-14 PROCEDURE — 3008F BODY MASS INDEX DOCD: CPT | Performed by: FAMILY MEDICINE

## 2021-07-14 PROCEDURE — 99214 OFFICE O/P EST MOD 30 MIN: CPT | Performed by: FAMILY MEDICINE

## 2021-07-14 RX ORDER — ESCITALOPRAM OXALATE 10 MG/1
10 TABLET ORAL DAILY
Qty: 90 TABLET | Refills: 1 | Status: SHIPPED | OUTPATIENT
Start: 2021-07-14

## 2021-07-14 NOTE — PROGRESS NOTES
Brody Hester is a 44year old female. 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 Has been having to work from home. Feels like the dose of the Lexapro needs to go up.   States she feels like he depressio difficult have these problems made it for you to do your work, take care of things at home, or get along with other people? Not difficult at all         Right lower rib pain:  Sharp.   Occurs when she is doing a higher level of cardio, when she stops doing hearing loss in left ear and some in right    • History of HPV infection 6/2/2015   • Hx of colonoscopy 05/27/2021   • Incarcerated umbilical hernia 2/94/7871   • Insomnia 7/20/2015   • Iron deficiency anemia due to chronic blood loss 10/25/2020   • Normal Grandmother    • Breast Cancer Maternal Grandmother 72        estimated age   • Breast Cancer Maternal Aunt    • Glaucoma Mother    • Breast Cancer Self 45     REVIEW OF SYSTEMS:   GENERAL HEALTH: overall feels well, no fevers  RESPIRATORY: Denies: BESSY/TRAYLOR mouth daily. Dispense: 90 tablet; Refill: 1    3.  Right upper quadrant pain  This is intermittent and only occurs when doing a higher level of cardio, therefore suspect this is related to patient's diaphragm or scar tissue related to history of lap garcia

## 2021-07-15 VITALS
SYSTOLIC BLOOD PRESSURE: 104 MMHG | HEIGHT: 63 IN | WEIGHT: 171 LBS | BODY MASS INDEX: 30.3 KG/M2 | TEMPERATURE: 97 F | OXYGEN SATURATION: 97 % | DIASTOLIC BLOOD PRESSURE: 64 MMHG | HEART RATE: 75 BPM

## 2021-10-10 NOTE — PROGRESS NOTES
[facility] Progress Note      Patient Name:  Jhony Bonds  YOB: 1970  Medical Record Number:  [id number]    Date of visit: 10/11/2021    CHIEF COMPLAINT:  Stage I  L breast carcinoma s/p lumpectomy.     HPI:      44year old pre tablet (10 mg total) by mouth daily. 90 tablet 1   • multivitamin with iron 10 MG/ML Oral Solution Take 1 mL by mouth daily. • Calcium-Magnesium-Vitamin D (CALCIUM MAGNESIUM OR) Take by mouth.  Liquid     • Ascorbic Acid (VITAMIN C) 100 MG Oral Tab Take Prescriptions     Signed Prescriptions Disp Refills   • ALPRAZolam 0.5 MG Oral Tab 90 tablet 3     Sig: Take 1 tablet (0.5 mg total) by mouth nightly as needed for Sleep or Anxiety.        May follow-up with me annually or may just have mammograms ordered i

## 2021-10-11 ENCOUNTER — OFFICE VISIT (OUTPATIENT)
Dept: HEMATOLOGY/ONCOLOGY | Age: 51
End: 2021-10-11
Attending: INTERNAL MEDICINE
Payer: COMMERCIAL

## 2021-10-11 VITALS
DIASTOLIC BLOOD PRESSURE: 71 MMHG | HEART RATE: 77 BPM | BODY MASS INDEX: 31 KG/M2 | RESPIRATION RATE: 18 BRPM | WEIGHT: 175.81 LBS | OXYGEN SATURATION: 96 % | SYSTOLIC BLOOD PRESSURE: 115 MMHG | TEMPERATURE: 97 F

## 2021-10-11 DIAGNOSIS — C50.412 PRIMARY MALIGNANT NEOPLASM OF UPPER OUTER QUADRANT OF LEFT FEMALE BREAST (HCC): Primary | ICD-10-CM

## 2021-10-11 DIAGNOSIS — F33.1 MAJOR DEPRESSIVE DISORDER, RECURRENT EPISODE, MODERATE WITH ANXIOUS DISTRESS (HCC): ICD-10-CM

## 2021-10-11 PROCEDURE — 99214 OFFICE O/P EST MOD 30 MIN: CPT | Performed by: INTERNAL MEDICINE

## 2021-10-11 RX ORDER — ALPRAZOLAM 0.5 MG/1
0.5 TABLET ORAL NIGHTLY PRN
Qty: 90 TABLET | Refills: 3 | Status: SHIPPED | OUTPATIENT
Start: 2021-10-11

## 2021-10-11 NOTE — PROGRESS NOTES
Education Record    Learner:  Patient    Disease / Diagnosis:hx left breast cancer      Barriers / Limitations:  None   Comments:    Method:  Discussion   Comments:    General Topics:  Plan of care reviewed   Comments:    Outcome:  Shows understanding   Co

## 2021-12-01 ENCOUNTER — HOSPITAL ENCOUNTER (OUTPATIENT)
Dept: MAMMOGRAPHY | Age: 51
Discharge: HOME | End: 2021-12-01
Attending: INTERNAL MEDICINE
Payer: COMMERCIAL

## 2021-12-01 DIAGNOSIS — C50.412 PRIMARY MALIGNANT NEOPLASM OF UPPER OUTER QUADRANT OF LEFT FEMALE BREAST (HCC): ICD-10-CM

## 2021-12-01 PROCEDURE — 77063 BREAST TOMOSYNTHESIS BI: CPT | Performed by: INTERNAL MEDICINE

## 2021-12-01 PROCEDURE — 77067 SCR MAMMO BI INCL CAD: CPT | Performed by: INTERNAL MEDICINE

## 2021-12-07 ENCOUNTER — HOSPITAL ENCOUNTER (OUTPATIENT)
Dept: ULTRASOUND IMAGING | Age: 51
Discharge: HOME | End: 2021-12-07
Attending: INTERNAL MEDICINE
Payer: COMMERCIAL

## 2021-12-07 ENCOUNTER — HOSPITAL ENCOUNTER (OUTPATIENT)
Dept: MAMMOGRAPHY | Age: 51
Discharge: HOME | End: 2021-12-07
Attending: INTERNAL MEDICINE
Payer: COMMERCIAL

## 2021-12-07 DIAGNOSIS — R92.2 INCONCLUSIVE MAMMOGRAM: ICD-10-CM

## 2021-12-07 PROCEDURE — 77065 DX MAMMO INCL CAD UNI: CPT | Performed by: INTERNAL MEDICINE

## 2021-12-07 PROCEDURE — 76642 ULTRASOUND BREAST LIMITED: CPT | Performed by: INTERNAL MEDICINE

## 2021-12-07 PROCEDURE — 77061 BREAST TOMOSYNTHESIS UNI: CPT | Performed by: INTERNAL MEDICINE

## 2022-01-27 ENCOUNTER — TELEPHONE (OUTPATIENT)
Dept: FAMILY MEDICINE CLINIC | Facility: CLINIC | Age: 52
End: 2022-01-27

## 2022-01-27 DIAGNOSIS — H93.19 TINNITUS, UNSPECIFIED LATERALITY: Primary | ICD-10-CM

## 2022-01-27 NOTE — TELEPHONE ENCOUNTER
Patient requesting referral for Dr. Luis Corcoran ENT for hearing problem. She said she saw him a few years ago. Referral placed.

## 2022-02-01 RX ORDER — ESCITALOPRAM OXALATE 10 MG/1
TABLET ORAL
Qty: 90 TABLET | Refills: 1 | OUTPATIENT
Start: 2022-02-01

## 2022-02-24 ENCOUNTER — OFFICE VISIT (OUTPATIENT)
Dept: FAMILY MEDICINE CLINIC | Facility: CLINIC | Age: 52
End: 2022-02-24
Payer: COMMERCIAL

## 2022-02-24 VITALS
BODY MASS INDEX: 29.41 KG/M2 | DIASTOLIC BLOOD PRESSURE: 68 MMHG | HEIGHT: 63 IN | TEMPERATURE: 97 F | WEIGHT: 166 LBS | SYSTOLIC BLOOD PRESSURE: 112 MMHG | OXYGEN SATURATION: 96 % | HEART RATE: 72 BPM

## 2022-02-24 DIAGNOSIS — N18.2 CKD (CHRONIC KIDNEY DISEASE) STAGE 2, GFR 60-89 ML/MIN: ICD-10-CM

## 2022-02-24 DIAGNOSIS — Z79.899 ENCOUNTER FOR LONG-TERM CURRENT USE OF MEDICATION: ICD-10-CM

## 2022-02-24 DIAGNOSIS — Z51.81 THERAPEUTIC DRUG MONITORING: Primary | ICD-10-CM

## 2022-02-24 DIAGNOSIS — F33.1 MAJOR DEPRESSIVE DISORDER, RECURRENT EPISODE, MODERATE WITH ANXIOUS DISTRESS (HCC): ICD-10-CM

## 2022-02-24 DIAGNOSIS — D50.0 IRON DEFICIENCY ANEMIA DUE TO CHRONIC BLOOD LOSS: ICD-10-CM

## 2022-02-24 DIAGNOSIS — Z13.6 SCREENING FOR HEART DISEASE: ICD-10-CM

## 2022-02-24 DIAGNOSIS — H91.93 BILATERAL HEARING LOSS, UNSPECIFIED HEARING LOSS TYPE: ICD-10-CM

## 2022-02-24 PROCEDURE — 3008F BODY MASS INDEX DOCD: CPT | Performed by: FAMILY MEDICINE

## 2022-02-24 PROCEDURE — 3078F DIAST BP <80 MM HG: CPT | Performed by: FAMILY MEDICINE

## 2022-02-24 PROCEDURE — 99214 OFFICE O/P EST MOD 30 MIN: CPT | Performed by: FAMILY MEDICINE

## 2022-02-24 PROCEDURE — 3074F SYST BP LT 130 MM HG: CPT | Performed by: FAMILY MEDICINE

## 2022-02-24 RX ORDER — ESCITALOPRAM OXALATE 10 MG/1
10 TABLET ORAL DAILY
Qty: 90 TABLET | Refills: 1 | Status: SHIPPED | OUTPATIENT
Start: 2022-02-24

## 2022-02-24 RX ORDER — VITAMIN E 268 MG
1000 CAPSULE ORAL DAILY
COMMUNITY

## 2022-05-16 ENCOUNTER — TELEPHONE (OUTPATIENT)
Dept: FAMILY MEDICINE CLINIC | Facility: CLINIC | Age: 52
End: 2022-05-16

## 2022-05-16 NOTE — TELEPHONE ENCOUNTER
Patient is at [facility] they will not accept her paper orders for labs they said it must be faxed  Patient is at [facility] now    [facility]  Fax# [phone]

## 2022-05-17 LAB
ABSOLUTE BASOPHILS: 51 CELLS/UL (ref 0–200)
ABSOLUTE EOSINOPHILS: 128 CELLS/UL (ref 15–500)
ABSOLUTE LYMPHOCYTES: 1971 CELLS/UL (ref 850–3900)
ABSOLUTE MONOCYTES: 544 CELLS/UL (ref 200–950)
ABSOLUTE NEUTROPHILS: 3706 CELLS/UL (ref 1500–7800)
ALBUMIN/GLOBULIN RATIO: 1.8 (CALC) (ref 1–2.5)
ALBUMIN: 4.6 G/DL (ref 3.6–5.1)
ALKALINE PHOSPHATASE: 74 U/L (ref 37–153)
ALT: 31 U/L (ref 6–29)
AST: 27 U/L (ref 10–35)
BASOPHILS: 0.8 %
BILIRUBIN, TOTAL: 0.6 MG/DL (ref 0.2–1.2)
BUN: 24 MG/DL (ref 7–25)
CALCIUM: 9.6 MG/DL (ref 8.6–10.4)
CARBON DIOXIDE: 27 MMOL/L (ref 20–32)
CHLORIDE: 107 MMOL/L (ref 98–110)
CHOL/HDLC RATIO: 3.9 (CALC)
CHOLESTEROL, TOTAL: 187 MG/DL
CREATININE: 0.81 MG/DL (ref 0.5–1.05)
EGFR IF AFRICN AM: 96 ML/MIN/1.73M2
EGFR IF NONAFRICN AM: 83 ML/MIN/1.73M2
EOSINOPHILS: 2 %
FERRITIN: 60 NG/ML (ref 16–232)
GLOBULIN: 2.6 G/DL (CALC) (ref 1.9–3.7)
GLUCOSE: 85 MG/DL (ref 65–99)
HDL CHOLESTEROL: 48 MG/DL
HEMATOCRIT: 42.5 % (ref 35–45)
HEMOGLOBIN: 14 G/DL (ref 11.7–15.5)
LDL-CHOLESTEROL: 120 MG/DL (CALC)
LYMPHOCYTES: 30.8 %
MCH: 30.5 PG (ref 27–33)
MCHC: 32.9 G/DL (ref 32–36)
MCV: 92.6 FL (ref 80–100)
MONOCYTES: 8.5 %
MPV: 11.3 FL (ref 7.5–12.5)
NEUTROPHILS: 57.9 %
NON-HDL CHOLESTEROL: 139 MG/DL (CALC)
PLATELET COUNT: 255 THOUSAND/UL (ref 140–400)
POTASSIUM: 4.2 MMOL/L (ref 3.5–5.3)
PROTEIN, TOTAL: 7.2 G/DL (ref 6.1–8.1)
RDW: 12.1 % (ref 11–15)
RED BLOOD CELL COUNT: 4.59 MILLION/UL (ref 3.8–5.1)
SODIUM: 142 MMOL/L (ref 135–146)
T4, FREE: 1.2 NG/DL (ref 0.8–1.8)
TRIGLYCERIDES: 88 MG/DL
TSH: 2.41 MIU/L
WHITE BLOOD CELL COUNT: 6.4 THOUSAND/UL (ref 3.8–10.8)

## 2022-05-25 ENCOUNTER — OFFICE VISIT (OUTPATIENT)
Dept: FAMILY MEDICINE CLINIC | Facility: CLINIC | Age: 52
End: 2022-05-25
Payer: COMMERCIAL

## 2022-05-25 VITALS
HEIGHT: 63 IN | DIASTOLIC BLOOD PRESSURE: 62 MMHG | RESPIRATION RATE: 18 BRPM | WEIGHT: 159.19 LBS | SYSTOLIC BLOOD PRESSURE: 98 MMHG | BODY MASS INDEX: 28.21 KG/M2 | TEMPERATURE: 97 F

## 2022-05-25 DIAGNOSIS — Z85.3 HISTORY OF BREAST CANCER: ICD-10-CM

## 2022-05-25 DIAGNOSIS — Z97.4 WEARS HEARING AID: ICD-10-CM

## 2022-05-25 DIAGNOSIS — E78.00 HYPERCHOLESTEROLEMIA: ICD-10-CM

## 2022-05-25 DIAGNOSIS — Z01.419 ENCOUNTER FOR ROUTINE GYNECOLOGICAL EXAMINATION WITH PAPANICOLAOU SMEAR OF CERVIX: Primary | ICD-10-CM

## 2022-05-25 DIAGNOSIS — R63.4 WEIGHT LOSS: ICD-10-CM

## 2022-05-25 DIAGNOSIS — E66.3 OVERWEIGHT WITH BODY MASS INDEX (BMI) OF 28 TO 28.9 IN ADULT: ICD-10-CM

## 2022-05-25 DIAGNOSIS — Z00.00 ROUTINE GENERAL MEDICAL EXAMINATION AT A HEALTH CARE FACILITY: ICD-10-CM

## 2022-05-25 DIAGNOSIS — R74.8 ELEVATED LIVER ENZYMES: ICD-10-CM

## 2022-05-25 DIAGNOSIS — Z12.31 ENCOUNTER FOR SCREENING MAMMOGRAM FOR MALIGNANT NEOPLASM OF BREAST: ICD-10-CM

## 2022-05-25 PROBLEM — Z12.11 ENCOUNTER FOR SCREENING COLONOSCOPY: Status: RESOLVED | Noted: 2021-04-06 | Resolved: 2022-05-25

## 2022-05-25 PROBLEM — D50.0 IRON DEFICIENCY ANEMIA DUE TO CHRONIC BLOOD LOSS: Status: RESOLVED | Noted: 2022-02-24 | Resolved: 2022-05-25

## 2022-05-25 PROBLEM — N84.1 CERVICAL POLYP: Status: RESOLVED | Noted: 2019-07-15 | Resolved: 2022-05-25

## 2022-05-25 PROBLEM — N92.6 IRREGULAR MENSTRUAL BLEEDING: Status: RESOLVED | Noted: 2017-03-06 | Resolved: 2022-05-25

## 2022-05-25 PROCEDURE — 87624 HPV HI-RISK TYP POOLED RSLT: CPT | Performed by: FAMILY MEDICINE

## 2022-05-25 PROCEDURE — 99213 OFFICE O/P EST LOW 20 MIN: CPT | Performed by: FAMILY MEDICINE

## 2022-05-25 PROCEDURE — 88175 CYTOPATH C/V AUTO FLUID REDO: CPT | Performed by: FAMILY MEDICINE

## 2022-05-25 PROCEDURE — 3078F DIAST BP <80 MM HG: CPT | Performed by: FAMILY MEDICINE

## 2022-05-25 PROCEDURE — 3008F BODY MASS INDEX DOCD: CPT | Performed by: FAMILY MEDICINE

## 2022-05-25 PROCEDURE — 3074F SYST BP LT 130 MM HG: CPT | Performed by: FAMILY MEDICINE

## 2022-05-25 PROCEDURE — 99396 PREV VISIT EST AGE 40-64: CPT | Performed by: FAMILY MEDICINE

## 2022-05-26 LAB — HPV I/H RISK 1 DNA SPEC QL NAA+PROBE: NEGATIVE

## 2022-05-31 PROBLEM — R74.8 ELEVATED LIVER ENZYMES: Status: ACTIVE | Noted: 2022-05-31

## 2022-05-31 PROBLEM — Z97.4 WEARS HEARING AID: Status: ACTIVE | Noted: 2022-05-31

## 2022-05-31 PROBLEM — E78.00 HYPERCHOLESTEROLEMIA: Status: ACTIVE | Noted: 2022-05-31

## 2022-05-31 PROBLEM — Z85.3 HISTORY OF BREAST CANCER: Status: ACTIVE | Noted: 2022-05-31

## 2022-08-19 ENCOUNTER — HOSPITAL ENCOUNTER (OUTPATIENT)
Age: 52
Discharge: HOME | End: 2022-08-19
Payer: COMMERCIAL

## 2022-08-19 ENCOUNTER — APPOINTMENT (OUTPATIENT)
Dept: GENERAL RADIOLOGY | Age: 52
End: 2022-08-19
Attending: NURSE PRACTITIONER
Payer: COMMERCIAL

## 2022-08-19 VITALS
HEIGHT: 64 IN | BODY MASS INDEX: 25.61 KG/M2 | TEMPERATURE: 98 F | RESPIRATION RATE: 17 BRPM | WEIGHT: 150 LBS | HEART RATE: 93 BPM | SYSTOLIC BLOOD PRESSURE: 115 MMHG | OXYGEN SATURATION: 97 % | DIASTOLIC BLOOD PRESSURE: 77 MMHG

## 2022-08-19 DIAGNOSIS — J40 SINOBRONCHITIS: ICD-10-CM

## 2022-08-19 DIAGNOSIS — H10.33 ACUTE CONJUNCTIVITIS OF BOTH EYES, UNSPECIFIED ACUTE CONJUNCTIVITIS TYPE: Primary | ICD-10-CM

## 2022-08-19 DIAGNOSIS — J32.9 SINOBRONCHITIS: ICD-10-CM

## 2022-08-19 PROCEDURE — 99213 OFFICE O/P EST LOW 20 MIN: CPT

## 2022-08-19 PROCEDURE — 71046 X-RAY EXAM CHEST 2 VIEWS: CPT | Performed by: NURSE PRACTITIONER

## 2022-08-19 RX ORDER — OFLOXACIN 3 MG/ML
2 SOLUTION/ DROPS OPHTHALMIC 4 TIMES DAILY
Qty: 1 EACH | Refills: 0 | Status: SHIPPED | OUTPATIENT
Start: 2022-08-19 | End: 2022-08-26

## 2022-08-19 RX ORDER — BENZONATATE 100 MG/1
100 CAPSULE ORAL 3 TIMES DAILY PRN
Qty: 30 CAPSULE | Refills: 0 | Status: SHIPPED | OUTPATIENT
Start: 2022-08-19 | End: 2022-09-18

## 2022-08-19 RX ORDER — DOXYCYCLINE HYCLATE 100 MG/1
100 CAPSULE ORAL 2 TIMES DAILY
Qty: 14 CAPSULE | Refills: 0 | Status: SHIPPED | OUTPATIENT
Start: 2022-08-19 | End: 2022-08-26

## 2022-08-19 NOTE — ED INITIAL ASSESSMENT (HPI)
Pt aox4. Pt c/o cough and stuffy nose x 1 week. Pt c/o lamont eye discharge with yellow crust on eyes started yesterday. Pt denies fevers. Pt reports had 2 negative at home Covid tests on Monday and Wednesday.

## 2022-08-22 DIAGNOSIS — Z51.81 THERAPEUTIC DRUG MONITORING: ICD-10-CM

## 2022-08-22 DIAGNOSIS — Z79.899 ENCOUNTER FOR LONG-TERM CURRENT USE OF MEDICATION: ICD-10-CM

## 2022-08-24 RX ORDER — ESCITALOPRAM OXALATE 10 MG/1
TABLET ORAL
Qty: 90 TABLET | Refills: 0 | Status: SHIPPED | OUTPATIENT
Start: 2022-08-24

## 2022-11-01 ENCOUNTER — LAB ENCOUNTER (OUTPATIENT)
Dept: LAB | Age: 52
End: 2022-11-01
Attending: FAMILY MEDICINE
Payer: COMMERCIAL

## 2022-11-01 DIAGNOSIS — R74.8 ELEVATED LIVER ENZYMES: ICD-10-CM

## 2022-11-01 LAB
ALBUMIN SERPL-MCNC: 3.8 G/DL (ref 3.4–5)
ALP LIVER SERPL-CCNC: 79 U/L
ALT SERPL-CCNC: 59 U/L
AST SERPL-CCNC: 31 U/L (ref 15–37)
BILIRUB DIRECT SERPL-MCNC: <0.1 MG/DL (ref 0–0.2)
BILIRUB SERPL-MCNC: 0.4 MG/DL (ref 0.1–2)
PROT SERPL-MCNC: 7.4 G/DL (ref 6.4–8.2)

## 2022-11-01 PROCEDURE — 36415 COLL VENOUS BLD VENIPUNCTURE: CPT

## 2022-11-01 PROCEDURE — 80076 HEPATIC FUNCTION PANEL: CPT

## 2022-11-03 ENCOUNTER — OFFICE VISIT (OUTPATIENT)
Dept: FAMILY MEDICINE CLINIC | Facility: CLINIC | Age: 52
End: 2022-11-03
Payer: COMMERCIAL

## 2022-11-03 VITALS
RESPIRATION RATE: 20 BRPM | BODY MASS INDEX: 29.13 KG/M2 | SYSTOLIC BLOOD PRESSURE: 110 MMHG | TEMPERATURE: 97 F | OXYGEN SATURATION: 98 % | DIASTOLIC BLOOD PRESSURE: 80 MMHG | HEIGHT: 64 IN | HEART RATE: 71 BPM | WEIGHT: 170.63 LBS

## 2022-11-03 DIAGNOSIS — K75.81 NASH (NONALCOHOLIC STEATOHEPATITIS): ICD-10-CM

## 2022-11-03 DIAGNOSIS — Z23 NEED FOR VACCINATION: ICD-10-CM

## 2022-11-03 DIAGNOSIS — F33.1 MAJOR DEPRESSIVE DISORDER, RECURRENT EPISODE, MODERATE WITH ANXIOUS DISTRESS (HCC): ICD-10-CM

## 2022-11-03 DIAGNOSIS — E66.3 OVERWEIGHT WITH BODY MASS INDEX (BMI) OF 29 TO 29.9 IN ADULT: ICD-10-CM

## 2022-11-03 DIAGNOSIS — Z51.81 THERAPEUTIC DRUG MONITORING: Primary | ICD-10-CM

## 2022-11-03 DIAGNOSIS — G47.26 EPISODIC CIRCADIAN RHYTHM SLEEP DISORDER, SHIFT WORK TYPE: ICD-10-CM

## 2022-11-03 DIAGNOSIS — R74.8 ELEVATED LIVER ENZYMES: ICD-10-CM

## 2022-11-03 DIAGNOSIS — Z79.899 ENCOUNTER FOR LONG-TERM CURRENT USE OF MEDICATION: ICD-10-CM

## 2022-11-03 PROCEDURE — 3074F SYST BP LT 130 MM HG: CPT | Performed by: FAMILY MEDICINE

## 2022-11-03 PROCEDURE — 90471 IMMUNIZATION ADMIN: CPT | Performed by: FAMILY MEDICINE

## 2022-11-03 PROCEDURE — 90686 IIV4 VACC NO PRSV 0.5 ML IM: CPT | Performed by: FAMILY MEDICINE

## 2022-11-03 PROCEDURE — 3008F BODY MASS INDEX DOCD: CPT | Performed by: FAMILY MEDICINE

## 2022-11-03 PROCEDURE — 3079F DIAST BP 80-89 MM HG: CPT | Performed by: FAMILY MEDICINE

## 2022-11-03 PROCEDURE — 99214 OFFICE O/P EST MOD 30 MIN: CPT | Performed by: FAMILY MEDICINE

## 2022-11-03 RX ORDER — ESCITALOPRAM OXALATE 10 MG/1
10 TABLET ORAL DAILY
Qty: 90 TABLET | Refills: 1 | Status: SHIPPED | OUTPATIENT
Start: 2022-11-03

## 2022-12-07 ENCOUNTER — HOSPITAL ENCOUNTER (OUTPATIENT)
Dept: MAMMOGRAPHY | Age: 52
Discharge: HOME | End: 2022-12-07
Attending: FAMILY MEDICINE
Payer: COMMERCIAL

## 2022-12-07 DIAGNOSIS — Z85.3 HISTORY OF BREAST CANCER: ICD-10-CM

## 2022-12-07 DIAGNOSIS — Z12.31 ENCOUNTER FOR SCREENING MAMMOGRAM FOR MALIGNANT NEOPLASM OF BREAST: ICD-10-CM

## 2022-12-07 PROCEDURE — 77067 SCR MAMMO BI INCL CAD: CPT | Performed by: FAMILY MEDICINE

## 2022-12-07 PROCEDURE — 77063 BREAST TOMOSYNTHESIS BI: CPT | Performed by: FAMILY MEDICINE

## 2022-12-08 ENCOUNTER — TELEPHONE (OUTPATIENT)
Dept: FAMILY MEDICINE CLINIC | Facility: CLINIC | Age: 52
End: 2022-12-08

## 2023-02-10 ENCOUNTER — LAB REQUISITION (OUTPATIENT)
Dept: LAB | Facility: HOSPITAL | Age: 53
End: 2023-02-10
Payer: COMMERCIAL

## 2023-02-10 DIAGNOSIS — D48.5 NEOPLASM OF UNCERTAIN BEHAVIOR OF SKIN: ICD-10-CM

## 2023-02-22 ENCOUNTER — HOSPITAL ENCOUNTER (OUTPATIENT)
Dept: ULTRASOUND IMAGING | Age: 53
Discharge: HOME | End: 2023-02-22
Attending: STUDENT IN AN ORGANIZED HEALTH CARE EDUCATION/TRAINING PROGRAM
Payer: COMMERCIAL

## 2023-02-22 ENCOUNTER — LAB ENCOUNTER (OUTPATIENT)
Dept: LAB | Age: 53
End: 2023-02-22
Attending: STUDENT IN AN ORGANIZED HEALTH CARE EDUCATION/TRAINING PROGRAM
Payer: COMMERCIAL

## 2023-02-22 DIAGNOSIS — R74.8 ELEVATED LIVER ENZYMES: ICD-10-CM

## 2023-02-22 DIAGNOSIS — K75.81 NASH (NONALCOHOLIC STEATOHEPATITIS): ICD-10-CM

## 2023-02-22 LAB
ALBUMIN SERPL-MCNC: 3.8 G/DL (ref 3.4–5)
ALBUMIN/GLOB SERPL: 1.1 {RATIO} (ref 1–2)
ALP LIVER SERPL-CCNC: 77 U/L
ALT SERPL-CCNC: 54 U/L
ANION GAP SERPL CALC-SCNC: 2 MMOL/L (ref 0–18)
AST SERPL-CCNC: 30 U/L (ref 15–37)
BASOPHILS # BLD AUTO: 0.03 X10(3) UL (ref 0–0.2)
BASOPHILS NFR BLD AUTO: 0.6 %
BILIRUB SERPL-MCNC: 0.7 MG/DL (ref 0.1–2)
BUN BLD-MCNC: 14 MG/DL (ref 7–18)
CALCIUM BLD-MCNC: 9.1 MG/DL (ref 8.5–10.1)
CERULOPLASMIN SERPL-MCNC: 28.1 MG/DL (ref 20–60)
CHLORIDE SERPL-SCNC: 110 MMOL/L (ref 98–112)
CO2 SERPL-SCNC: 29 MMOL/L (ref 21–32)
CREAT BLD-MCNC: 0.91 MG/DL
DEPRECATED HBV CORE AB SER IA-ACNC: 84 NG/ML
EOSINOPHIL # BLD AUTO: 0.1 X10(3) UL (ref 0–0.7)
EOSINOPHIL NFR BLD AUTO: 2.1 %
ERYTHROCYTE [DISTWIDTH] IN BLOOD BY AUTOMATED COUNT: 12.9 %
FASTING STATUS PATIENT QL REPORTED: YES
GFR SERPLBLD BASED ON 1.73 SQ M-ARVRAT: 75 ML/MIN/1.73M2 (ref 60–?)
GLOBULIN PLAS-MCNC: 3.6 G/DL (ref 2.8–4.4)
GLUCOSE BLD-MCNC: 108 MG/DL (ref 70–99)
HAV AB SER QL IA: NONREACTIVE
HBV CORE AB SERPL QL IA: NONREACTIVE
HBV SURFACE AB SER QL: NONREACTIVE
HBV SURFACE AB SERPL IA-ACNC: <3.1 MIU/ML
HBV SURFACE AG SER-ACNC: <0.1 [IU]/L
HBV SURFACE AG SERPL QL IA: NONREACTIVE
HCT VFR BLD AUTO: 39.6 %
HCV AB SERPL QL IA: NONREACTIVE
HGB BLD-MCNC: 13.2 G/DL
IGA SERPL-MCNC: 174 MG/DL (ref 70–312)
IMM GRANULOCYTES # BLD AUTO: 0.01 X10(3) UL (ref 0–1)
IMM GRANULOCYTES NFR BLD: 0.2 %
IRON SATN MFR SERPL: 20 %
IRON SERPL-MCNC: 74 UG/DL
LYMPHOCYTES # BLD AUTO: 1.35 X10(3) UL (ref 1–4)
LYMPHOCYTES NFR BLD AUTO: 28.3 %
MCH RBC QN AUTO: 31.5 PG (ref 26–34)
MCHC RBC AUTO-ENTMCNC: 33.3 G/DL (ref 31–37)
MCV RBC AUTO: 94.5 FL
MONOCYTES # BLD AUTO: 0.54 X10(3) UL (ref 0.1–1)
MONOCYTES NFR BLD AUTO: 11.3 %
NEUTROPHILS # BLD AUTO: 2.74 X10 (3) UL (ref 1.5–7.7)
NEUTROPHILS # BLD AUTO: 2.74 X10(3) UL (ref 1.5–7.7)
NEUTROPHILS NFR BLD AUTO: 57.5 %
OSMOLALITY SERPL CALC.SUM OF ELEC: 293 MOSM/KG (ref 275–295)
PLATELET # BLD AUTO: 239 10(3)UL (ref 150–450)
POTASSIUM SERPL-SCNC: 4 MMOL/L (ref 3.5–5.1)
PROT SERPL-MCNC: 7.4 G/DL (ref 6.4–8.2)
RBC # BLD AUTO: 4.19 X10(6)UL
SODIUM SERPL-SCNC: 141 MMOL/L (ref 136–145)
TIBC SERPL-MCNC: 371 UG/DL (ref 240–450)
TRANSFERRIN SERPL-MCNC: 249 MG/DL (ref 200–360)
WBC # BLD AUTO: 4.8 X10(3) UL (ref 4–11)

## 2023-02-22 PROCEDURE — 80053 COMPREHEN METABOLIC PANEL: CPT

## 2023-02-22 PROCEDURE — 86364 TISS TRNSGLTMNASE EA IG CLAS: CPT

## 2023-02-22 PROCEDURE — 87340 HEPATITIS B SURFACE AG IA: CPT

## 2023-02-22 PROCEDURE — 85025 COMPLETE CBC W/AUTO DIFF WBC: CPT

## 2023-02-22 PROCEDURE — 86803 HEPATITIS C AB TEST: CPT

## 2023-02-22 PROCEDURE — 36415 COLL VENOUS BLD VENIPUNCTURE: CPT

## 2023-02-22 PROCEDURE — 86704 HEP B CORE ANTIBODY TOTAL: CPT

## 2023-02-22 PROCEDURE — 83550 IRON BINDING TEST: CPT

## 2023-02-22 PROCEDURE — 76705 ECHO EXAM OF ABDOMEN: CPT | Performed by: STUDENT IN AN ORGANIZED HEALTH CARE EDUCATION/TRAINING PROGRAM

## 2023-02-22 PROCEDURE — 83540 ASSAY OF IRON: CPT

## 2023-02-22 PROCEDURE — 86256 FLUORESCENT ANTIBODY TITER: CPT

## 2023-02-22 PROCEDURE — 82784 ASSAY IGA/IGD/IGG/IGM EACH: CPT

## 2023-02-22 PROCEDURE — 76981 USE PARENCHYMA: CPT | Performed by: STUDENT IN AN ORGANIZED HEALTH CARE EDUCATION/TRAINING PROGRAM

## 2023-02-22 PROCEDURE — 82728 ASSAY OF FERRITIN: CPT

## 2023-02-22 PROCEDURE — 86708 HEPATITIS A ANTIBODY: CPT

## 2023-02-22 PROCEDURE — 86706 HEP B SURFACE ANTIBODY: CPT

## 2023-02-22 PROCEDURE — 82390 ASSAY OF CERULOPLASMIN: CPT

## 2023-02-24 NOTE — PROGRESS NOTES
Aury    Minimal scaring noted in the liver on the US. No other abnormalities noted on the labs aside from mild liver inflammation (ALT should be less than 30 for a female). Focus on weight loss through diet and exercise. I would recommend you get vaccinated against hepatitis A and B through your PCPs office at your convenience.   Please call with any questions,    Sowmya Ott MD

## 2023-03-01 LAB — TTG IGA SER-ACNC: 0.3 U/ML (ref ?–7)

## 2023-03-27 ENCOUNTER — APPOINTMENT (OUTPATIENT)
Dept: GENERAL RADIOLOGY | Age: 53
End: 2023-03-27
Attending: NURSE PRACTITIONER
Payer: COMMERCIAL

## 2023-03-27 ENCOUNTER — HOSPITAL ENCOUNTER (OUTPATIENT)
Age: 53
Discharge: HOME | End: 2023-03-27
Payer: COMMERCIAL

## 2023-03-27 VITALS
TEMPERATURE: 98 F | OXYGEN SATURATION: 99 % | HEART RATE: 72 BPM | DIASTOLIC BLOOD PRESSURE: 81 MMHG | RESPIRATION RATE: 16 BRPM | SYSTOLIC BLOOD PRESSURE: 130 MMHG

## 2023-03-27 DIAGNOSIS — S40.011A CONTUSION OF RIGHT SHOULDER, INITIAL ENCOUNTER: Primary | ICD-10-CM

## 2023-03-27 PROCEDURE — 73030 X-RAY EXAM OF SHOULDER: CPT | Performed by: NURSE PRACTITIONER

## 2023-03-27 PROCEDURE — 99213 OFFICE O/P EST LOW 20 MIN: CPT

## 2023-03-27 PROCEDURE — 99214 OFFICE O/P EST MOD 30 MIN: CPT

## 2023-03-27 RX ORDER — IBUPROFEN 800 MG/1
800 TABLET ORAL ONCE
Status: COMPLETED | OUTPATIENT
Start: 2023-03-27 | End: 2023-03-27

## 2023-03-27 NOTE — ED INITIAL ASSESSMENT (HPI)
C/o slipped and fell in the hotel's bath tub on Saturday. Unable to bend right arm with upper arm pain. Denies hitting head.

## 2023-03-28 NOTE — DISCHARGE INSTRUCTIONS
Rest, ice and elevate as much as possible over the next few days. Wear the sling for the next several days to allow for rest.   Take Tylenol and/or ibuprofen as needed for pain control. Follow up with your PCP or the orthopedic in the next 5-7 days if not improved. Thank you for choosing [facility] for your care.

## 2023-03-31 ENCOUNTER — HOSPITAL ENCOUNTER (OUTPATIENT)
Dept: MRI IMAGING | Age: 53
Discharge: HOME | End: 2023-03-31
Attending: FAMILY MEDICINE
Payer: COMMERCIAL

## 2023-03-31 DIAGNOSIS — M67.911 ROTATOR CUFF DISORDER, RIGHT: ICD-10-CM

## 2023-03-31 DIAGNOSIS — S42.251A CLOSED DISPLACED FRACTURE OF GREATER TUBEROSITY OF RIGHT HUMERUS, INITIAL ENCOUNTER: ICD-10-CM

## 2023-03-31 PROCEDURE — 73221 MRI JOINT UPR EXTREM W/O DYE: CPT | Performed by: FAMILY MEDICINE

## 2023-04-26 ENCOUNTER — OFFICE VISIT (OUTPATIENT)
Dept: INTERNAL MEDICINE CLINIC | Facility: CLINIC | Age: 53
End: 2023-04-26
Payer: COMMERCIAL

## 2023-04-26 VITALS
WEIGHT: 182 LBS | BODY MASS INDEX: 31.07 KG/M2 | OXYGEN SATURATION: 97 % | HEIGHT: 64 IN | SYSTOLIC BLOOD PRESSURE: 118 MMHG | RESPIRATION RATE: 18 BRPM | DIASTOLIC BLOOD PRESSURE: 80 MMHG | HEART RATE: 78 BPM

## 2023-04-26 DIAGNOSIS — Z51.81 ENCOUNTER FOR THERAPEUTIC DRUG LEVEL MONITORING: Primary | ICD-10-CM

## 2023-04-26 DIAGNOSIS — R73.01 IFG (IMPAIRED FASTING GLUCOSE): ICD-10-CM

## 2023-04-26 DIAGNOSIS — F32.A ANXIETY AND DEPRESSION: ICD-10-CM

## 2023-04-26 DIAGNOSIS — E78.00 HYPERCHOLESTEROLEMIA: ICD-10-CM

## 2023-04-26 DIAGNOSIS — K75.81 NASH (NONALCOHOLIC STEATOHEPATITIS): ICD-10-CM

## 2023-04-26 DIAGNOSIS — E66.9 CLASS 1 OBESITY WITH SERIOUS COMORBIDITY AND BODY MASS INDEX (BMI) OF 31.0 TO 31.9 IN ADULT, UNSPECIFIED OBESITY TYPE: ICD-10-CM

## 2023-04-26 DIAGNOSIS — F41.9 ANXIETY AND DEPRESSION: ICD-10-CM

## 2023-04-26 PROCEDURE — 3079F DIAST BP 80-89 MM HG: CPT | Performed by: PHYSICIAN ASSISTANT

## 2023-04-26 PROCEDURE — 3074F SYST BP LT 130 MM HG: CPT | Performed by: PHYSICIAN ASSISTANT

## 2023-04-26 PROCEDURE — 3008F BODY MASS INDEX DOCD: CPT | Performed by: PHYSICIAN ASSISTANT

## 2023-04-26 PROCEDURE — 99204 OFFICE O/P NEW MOD 45 MIN: CPT | Performed by: PHYSICIAN ASSISTANT

## 2023-05-13 DIAGNOSIS — F33.1 MAJOR DEPRESSIVE DISORDER, RECURRENT EPISODE, MODERATE WITH ANXIOUS DISTRESS (HCC): ICD-10-CM

## 2023-05-13 DIAGNOSIS — Z79.899 ENCOUNTER FOR LONG-TERM CURRENT USE OF MEDICATION: ICD-10-CM

## 2023-05-13 DIAGNOSIS — Z51.81 THERAPEUTIC DRUG MONITORING: ICD-10-CM

## 2023-05-15 RX ORDER — ESCITALOPRAM OXALATE 10 MG/1
10 TABLET ORAL DAILY
Qty: 90 TABLET | Refills: 0 | Status: SHIPPED | OUTPATIENT
Start: 2023-05-15

## 2023-05-16 NOTE — TELEPHONE ENCOUNTER
Appt scheduled  Future Appointments   Date Time Provider Byron Henson   6/29/2023  3:30 PM Jose Guadalupe Fuentes, DO [facility] [place]     Pt requesting a refill on ALPRAZolam 0.5 MG Oral Tab. Pt says Dr. Sharif Perez has prescribed this for her before. Please advise. Thank you.

## 2023-05-16 NOTE — TELEPHONE ENCOUNTER
Refill for generic Lexapro approved. Patient needs appointment for follow-up on depression/anxiety and wellness visit. No further refills until patient seen in the office for follow-up.

## 2023-05-17 RX ORDER — ALPRAZOLAM 0.5 MG/1
0.5 TABLET ORAL
Qty: 20 TABLET | Refills: 0 | Status: SHIPPED | OUTPATIENT
Start: 2023-05-17

## 2023-06-09 ENCOUNTER — TELEPHONE (OUTPATIENT)
Dept: INTERNAL MEDICINE CLINIC | Facility: CLINIC | Age: 53
End: 2023-06-09

## 2023-06-09 NOTE — TELEPHONE ENCOUNTER
Pt had to rsc to oct due to clinic cx.   Pt is having a difficult time finding wegovy, pt wants to know any suggestions on dosage or she could  a sample

## 2023-06-12 NOTE — TELEPHONE ENCOUNTER
Last seen 4/26/23 and given RX for wegovy 0.25 and 0.5 mg  Cannot find anywhere.   Recommend alternative?    10/16/2023  1:00 PM Maite Phillips PA-C [facility]

## 2023-06-15 RX ORDER — PEN NEEDLE, DIABETIC 30 GX3/16"
1 NEEDLE, DISPOSABLE MISCELLANEOUS DAILY
Qty: 90 EACH | Refills: 0 | Status: SHIPPED | OUTPATIENT
Start: 2023-06-15 | End: 2023-09-13

## 2023-06-15 RX ORDER — LIRAGLUTIDE 6 MG/ML
INJECTION, SOLUTION SUBCUTANEOUS
Qty: 15 ML | Refills: 0 | Status: SHIPPED | OUTPATIENT
Start: 2023-06-15 | End: 2023-07-19

## 2023-06-15 NOTE — TELEPHONE ENCOUNTER
If she can't find wegovy, lets switch to saxenda, I went ahead and sent to pharmacy    Saxenda:   Start therapy:  Week 1- .6mg daily   Week 2- 1.2mg daily   Week 3- 1.8mg daily   Week 4- 2.4mg daily   Week 5- 3mg daily

## 2023-06-28 RX ORDER — METHOCARBAMOL 750 MG/1
TABLET, FILM COATED ORAL
COMMUNITY
Start: 2023-05-18

## 2023-06-29 ENCOUNTER — OFFICE VISIT (OUTPATIENT)
Dept: FAMILY MEDICINE CLINIC | Facility: CLINIC | Age: 53
End: 2023-06-29
Payer: COMMERCIAL

## 2023-06-29 VITALS
BODY MASS INDEX: 30.87 KG/M2 | TEMPERATURE: 98 F | HEIGHT: 64 IN | WEIGHT: 180.81 LBS | DIASTOLIC BLOOD PRESSURE: 70 MMHG | OXYGEN SATURATION: 96 % | HEART RATE: 88 BPM | SYSTOLIC BLOOD PRESSURE: 110 MMHG

## 2023-06-29 DIAGNOSIS — Z00.00 ROUTINE GENERAL MEDICAL EXAMINATION AT A HEALTH CARE FACILITY: Primary | ICD-10-CM

## 2023-06-29 DIAGNOSIS — Z51.81 THERAPEUTIC DRUG MONITORING: ICD-10-CM

## 2023-06-29 DIAGNOSIS — Z79.899 ENCOUNTER FOR LONG-TERM CURRENT USE OF MEDICATION: ICD-10-CM

## 2023-06-29 DIAGNOSIS — M25.511 CHRONIC RIGHT SHOULDER PAIN: ICD-10-CM

## 2023-06-29 DIAGNOSIS — F33.1 MAJOR DEPRESSIVE DISORDER, RECURRENT EPISODE, MODERATE WITH ANXIOUS DISTRESS (HCC): ICD-10-CM

## 2023-06-29 DIAGNOSIS — Z23 NEED FOR VACCINATION: ICD-10-CM

## 2023-06-29 DIAGNOSIS — Z12.31 ENCOUNTER FOR SCREENING MAMMOGRAM FOR MALIGNANT NEOPLASM OF BREAST: ICD-10-CM

## 2023-06-29 DIAGNOSIS — Z85.3 HISTORY OF BREAST CANCER: ICD-10-CM

## 2023-06-29 DIAGNOSIS — G89.29 CHRONIC RIGHT SHOULDER PAIN: ICD-10-CM

## 2023-06-29 DIAGNOSIS — G47.9 SLEEP DISTURBANCE: ICD-10-CM

## 2023-06-29 PROCEDURE — 90472 IMMUNIZATION ADMIN EACH ADD: CPT | Performed by: FAMILY MEDICINE

## 2023-06-29 PROCEDURE — 3074F SYST BP LT 130 MM HG: CPT | Performed by: FAMILY MEDICINE

## 2023-06-29 PROCEDURE — 90632 HEPA VACCINE ADULT IM: CPT | Performed by: FAMILY MEDICINE

## 2023-06-29 PROCEDURE — 90746 HEPB VACCINE 3 DOSE ADULT IM: CPT | Performed by: FAMILY MEDICINE

## 2023-06-29 PROCEDURE — 99214 OFFICE O/P EST MOD 30 MIN: CPT | Performed by: FAMILY MEDICINE

## 2023-06-29 PROCEDURE — 3078F DIAST BP <80 MM HG: CPT | Performed by: FAMILY MEDICINE

## 2023-06-29 PROCEDURE — 90471 IMMUNIZATION ADMIN: CPT | Performed by: FAMILY MEDICINE

## 2023-06-29 PROCEDURE — 3008F BODY MASS INDEX DOCD: CPT | Performed by: FAMILY MEDICINE

## 2023-06-29 PROCEDURE — 99396 PREV VISIT EST AGE 40-64: CPT | Performed by: FAMILY MEDICINE

## 2023-06-29 RX ORDER — HYDROCODONE BITARTRATE AND ACETAMINOPHEN 5; 325 MG/1; MG/1
1 TABLET ORAL
Qty: 30 TABLET | Refills: 0 | Status: SHIPPED | OUTPATIENT
Start: 2023-06-29 | End: 2023-06-30

## 2023-06-29 RX ORDER — ALPRAZOLAM 0.5 MG/1
TABLET ORAL
Qty: 30 TABLET | Refills: 1 | Status: SHIPPED | OUTPATIENT
Start: 2023-06-29

## 2023-06-29 RX ORDER — ESCITALOPRAM OXALATE 10 MG/1
10 TABLET ORAL DAILY
Qty: 90 TABLET | Refills: 1 | Status: SHIPPED | OUTPATIENT
Start: 2023-06-29

## 2023-06-30 ENCOUNTER — TELEPHONE (OUTPATIENT)
Dept: FAMILY MEDICINE CLINIC | Facility: CLINIC | Age: 53
End: 2023-06-30

## 2023-06-30 DIAGNOSIS — M25.511 CHRONIC RIGHT SHOULDER PAIN: ICD-10-CM

## 2023-06-30 DIAGNOSIS — G89.29 CHRONIC RIGHT SHOULDER PAIN: ICD-10-CM

## 2023-06-30 DIAGNOSIS — G47.9 SLEEP DISTURBANCE: ICD-10-CM

## 2023-06-30 RX ORDER — HYDROCODONE BITARTRATE AND ACETAMINOPHEN 5; 325 MG/1; MG/1
1 TABLET ORAL
Qty: 30 TABLET | Refills: 0 | Status: SHIPPED | OUTPATIENT
Start: 2023-06-30

## 2023-07-01 PROBLEM — M25.511 CHRONIC RIGHT SHOULDER PAIN: Status: ACTIVE | Noted: 2023-07-01

## 2023-07-01 PROBLEM — G89.29 CHRONIC RIGHT SHOULDER PAIN: Status: ACTIVE | Noted: 2023-07-01

## 2023-07-01 PROBLEM — E66.3 OVERWEIGHT WITH BODY MASS INDEX (BMI) OF 29 TO 29.9 IN ADULT: Status: RESOLVED | Noted: 2022-11-03 | Resolved: 2023-07-01

## 2023-07-01 PROBLEM — E66.3 OVERWEIGHT WITH BODY MASS INDEX (BMI) OF 28 TO 28.9 IN ADULT: Status: RESOLVED | Noted: 2022-05-25 | Resolved: 2023-07-01

## 2023-07-18 NOTE — TELEPHONE ENCOUNTER
Requesting   Requested Prescriptions     Pending Prescriptions Disp Refills    SAXENDA 18 MG/3ML Subcutaneous Solution Pen-injector [Pharmacy Med Name: SAXENDA 18 MG/3 ML PEN]  0     Sig: INJECT 0.6 MG INTO THE SKIN DAILY FOR 7 DAYS, THEN 1.2 MG DAILY FOR 7 DAYS, THEN 1.8 MG DAILY FOR 7 DAYS, THEN 2.4 MG DAILY FOR 7 DAYS, THEN 3 MG DAILY.      LOV: 4/26/23  RTC: not noted  Filled: 6/15/23 #15 with 0 refills    Future Appointments   Date Time Provider Byron Henson   7/31/2023  3:45 PM [facility] NURSE [facility] [place]   10/13/2023  1:00 PM Karishma Alejo MD [facility]   10/16/2023  1:00 PM David Limon PA-C [facility]

## 2023-07-19 RX ORDER — LIRAGLUTIDE 6 MG/ML
3 INJECTION, SOLUTION SUBCUTANEOUS DAILY
Qty: 15 ML | Refills: 0 | Status: SHIPPED | OUTPATIENT
Start: 2023-07-19 | End: 2023-07-26

## 2023-07-25 NOTE — TELEPHONE ENCOUNTER
See message 6/16/23 - patient was aware of change to [address] [facility]. Encounter closed.

## 2023-07-31 ENCOUNTER — NURSE ONLY (OUTPATIENT)
Dept: FAMILY MEDICINE CLINIC | Facility: CLINIC | Age: 53
End: 2023-07-31
Payer: COMMERCIAL

## 2023-07-31 ENCOUNTER — TELEPHONE (OUTPATIENT)
Dept: FAMILY MEDICINE CLINIC | Facility: CLINIC | Age: 53
End: 2023-07-31

## 2023-07-31 DIAGNOSIS — Z23 NEED FOR VACCINATION: Primary | ICD-10-CM

## 2023-07-31 PROCEDURE — 90471 IMMUNIZATION ADMIN: CPT | Performed by: FAMILY MEDICINE

## 2023-07-31 PROCEDURE — 90746 HEPB VACCINE 3 DOSE ADULT IM: CPT | Performed by: FAMILY MEDICINE

## 2023-07-31 NOTE — TELEPHONE ENCOUNTER
Patient had 2nd Hep B done at nurse visit today. Will be due for 2nd Hep A and 3rd Hep B at the end of December. Orders  pended.

## 2023-08-03 NOTE — TELEPHONE ENCOUNTER
12/31/2023 falls on a Sunday and our office is closed on January 1, 2024, Future order for vaccinations placed to be completed on or shortly after January 2, 2024.

## 2023-08-21 RX ORDER — LIRAGLUTIDE 6 MG/ML
3 INJECTION, SOLUTION SUBCUTANEOUS DAILY
Qty: 15 ML | Refills: 1 | Status: SHIPPED | OUTPATIENT
Start: 2023-08-21 | End: 2023-08-28

## 2023-08-21 NOTE — TELEPHONE ENCOUNTER
Requesting   Requested Prescriptions     Pending Prescriptions Disp Refills    SAXENDA 18 MG/3ML Subcutaneous Solution Pen-injector [Pharmacy Med Name: SAXENDA 18 MG/3 ML PEN]  0     Sig: INJECT 3 MG INTO THE SKIN DAILY FOR 7 DAYS.      LOV: 4/26/23  RTC: not noted  Filled: 7/1/23 #15 with 0 refills    Future Appointments   Date Time Provider [facility]   10/13/2023  1:00 PM Bryon Alejo MD [address] [place] [address]   10/16/2023  1:00 PM Claudell Sill, PA-C [facility]   1/22/2024  1:00 PM Amanda Kebede DO [facility] [place]

## 2023-09-28 ENCOUNTER — TELEPHONE (OUTPATIENT)
Dept: FAMILY MEDICINE CLINIC | Facility: CLINIC | Age: 53
End: 2023-09-28

## 2023-10-03 ENCOUNTER — TELEPHONE (OUTPATIENT)
Dept: INTERNAL MEDICINE CLINIC | Facility: CLINIC | Age: 53
End: 2023-10-03

## 2023-10-03 NOTE — TELEPHONE ENCOUNTER
[facility] sent fax that Jonah Buerger is on back order asking for alternative. Her last visit was April. Do you want to discuss at upcoming visit?     10/16/2023  1:00 PM [place] Castillo [facility]

## 2023-10-11 NOTE — TELEPHONE ENCOUNTER
Can we see when her last dose of Saxenda was please, because we could switch her to higher dose [facility]PERRY RODRIGUEZ

## 2023-10-16 ENCOUNTER — OFFICE VISIT (OUTPATIENT)
Dept: INTERNAL MEDICINE CLINIC | Facility: CLINIC | Age: 53
End: 2023-10-16
Payer: COMMERCIAL

## 2023-10-16 VITALS
WEIGHT: 176 LBS | SYSTOLIC BLOOD PRESSURE: 92 MMHG | HEART RATE: 98 BPM | RESPIRATION RATE: 16 BRPM | DIASTOLIC BLOOD PRESSURE: 66 MMHG | BODY MASS INDEX: 30.05 KG/M2 | HEIGHT: 64 IN

## 2023-10-16 DIAGNOSIS — Z51.81 ENCOUNTER FOR THERAPEUTIC DRUG LEVEL MONITORING: Primary | ICD-10-CM

## 2023-10-16 DIAGNOSIS — E78.00 HYPERCHOLESTEROLEMIA: ICD-10-CM

## 2023-10-16 DIAGNOSIS — E66.9 CLASS 1 OBESITY WITH SERIOUS COMORBIDITY AND BODY MASS INDEX (BMI) OF 30.0 TO 30.9 IN ADULT, UNSPECIFIED OBESITY TYPE: ICD-10-CM

## 2023-10-16 DIAGNOSIS — R73.01 IFG (IMPAIRED FASTING GLUCOSE): ICD-10-CM

## 2023-10-16 DIAGNOSIS — K75.81 NASH (NONALCOHOLIC STEATOHEPATITIS): ICD-10-CM

## 2023-10-16 PROCEDURE — 3078F DIAST BP <80 MM HG: CPT | Performed by: PHYSICIAN ASSISTANT

## 2023-10-16 PROCEDURE — 3008F BODY MASS INDEX DOCD: CPT | Performed by: PHYSICIAN ASSISTANT

## 2023-10-16 PROCEDURE — 3074F SYST BP LT 130 MM HG: CPT | Performed by: PHYSICIAN ASSISTANT

## 2023-10-16 PROCEDURE — 99214 OFFICE O/P EST MOD 30 MIN: CPT | Performed by: PHYSICIAN ASSISTANT

## 2023-10-16 RX ORDER — PEN NEEDLE, DIABETIC 30 GX3/16"
1 NEEDLE, DISPOSABLE MISCELLANEOUS DAILY
Qty: 90 EACH | Refills: 0 | Status: SHIPPED | OUTPATIENT
Start: 2023-10-16 | End: 2024-01-14

## 2023-10-16 RX ORDER — LIRAGLUTIDE 6 MG/ML
INJECTION, SOLUTION SUBCUTANEOUS
Qty: 15 ML | Refills: 0 | Status: SHIPPED | OUTPATIENT
Start: 2023-10-16 | End: 2023-12-13

## 2023-10-16 RX ORDER — SEMAGLUTIDE 0.68 MG/ML
INJECTION, SOLUTION SUBCUTANEOUS
Qty: 1 EACH | Refills: 12 | Status: SHIPPED | OUTPATIENT
Start: 2023-10-16

## 2023-10-17 ENCOUNTER — TELEPHONE (OUTPATIENT)
Dept: INTERNAL MEDICINE CLINIC | Facility: CLINIC | Age: 53
End: 2023-10-17

## 2023-10-18 NOTE — TELEPHONE ENCOUNTER
I did give the pt a printed script for wegovy and saxenda so she could see if she could find it anywhere  We could also start metformin if she would like

## 2023-11-06 ENCOUNTER — TELEPHONE (OUTPATIENT)
Dept: INTERNAL MEDICINE CLINIC | Facility: CLINIC | Age: 53
End: 2023-11-06

## 2023-11-06 NOTE — TELEPHONE ENCOUNTER
Patient called to check on status of PA for insurance  LVM for Monie Carrillo to call her back. I called her back and discussed all. She is going to see if she can find [place]. If not she will let us know. She never read my chart about  medicine:          October 18, 2023  Me   to [address]      10/18/23  7:48 AM  Hi Meka Michellenano,  Since you are not diabetic your insurance will not cover the order for Ozempic. Monie Carrillo said she gave you printed prescriptions for wegovy and saxenda so you could see if you can find stock at any pharmacy. She said if you cannot find either medicine - she would put you on the pill metformin daily which helps control your appetite. Please let us know what you want to do. HOLLY Singh        This [place] book message has not been read.   October 17, 2023           10/17/23 10:55 PM  Hallie Marquis PA-C routed this conversation to . [address] [place]         10/17/23 10:55 PM  Note  I did give the pt a printed script for wegovy and saxenda so she could see if she could find it anywhere  We could also start metformin if she would like        Me     AB    10/17/23  7:03 AM  Note  [facility] requests PA for Ozempic - ordered yesterday  Patient is not diabetic - please advise

## 2023-11-17 ENCOUNTER — TELEPHONE (OUTPATIENT)
Dept: INTERNAL MEDICINE CLINIC | Facility: CLINIC | Age: 53
End: 2023-11-17

## 2023-11-17 RX ORDER — METFORMIN HYDROCHLORIDE 750 MG/1
750 TABLET, EXTENDED RELEASE ORAL
Qty: 30 TABLET | Refills: 2 | Status: SHIPPED | OUTPATIENT
Start: 2023-11-17

## 2023-11-17 NOTE — TELEPHONE ENCOUNTER
Patient called office to state she cannot find Saxenda and would like an order for metformin sent to her pharmacy. See notice 10/17/23 from [place] stating she would start patient on Metformin  I called patient to inform and she did not answer. Left message on her voicemail that we are sending metformin to [facility] to take once daily with a meal.    Order sent.

## 2023-12-28 ENCOUNTER — HOSPITAL ENCOUNTER (OUTPATIENT)
Dept: MAMMOGRAPHY | Age: 53
Discharge: HOME | End: 2023-12-28
Attending: FAMILY MEDICINE
Payer: COMMERCIAL

## 2023-12-28 DIAGNOSIS — Z12.31 ENCOUNTER FOR SCREENING MAMMOGRAM FOR MALIGNANT NEOPLASM OF BREAST: ICD-10-CM

## 2023-12-28 DIAGNOSIS — Z85.3 HISTORY OF BREAST CANCER: ICD-10-CM

## 2023-12-28 PROCEDURE — 77067 SCR MAMMO BI INCL CAD: CPT | Performed by: FAMILY MEDICINE

## 2023-12-28 PROCEDURE — 77063 BREAST TOMOSYNTHESIS BI: CPT | Performed by: FAMILY MEDICINE

## 2024-01-18 ENCOUNTER — LAB ENCOUNTER (OUTPATIENT)
Dept: LAB | Age: 54
End: 2024-01-18
Attending: FAMILY MEDICINE
Payer: COMMERCIAL

## 2024-01-18 DIAGNOSIS — Z00.00 ROUTINE GENERAL MEDICAL EXAMINATION AT A HEALTH CARE FACILITY: ICD-10-CM

## 2024-01-18 DIAGNOSIS — K75.81 NASH (NONALCOHOLIC STEATOHEPATITIS): ICD-10-CM

## 2024-01-18 LAB
BASOPHILS # BLD AUTO: 0.03 X10(3) UL (ref 0–0.2)
BASOPHILS NFR BLD AUTO: 0.5 %
EOSINOPHIL # BLD AUTO: 0.12 X10(3) UL (ref 0–0.7)
EOSINOPHIL NFR BLD AUTO: 2 %
ERYTHROCYTE [DISTWIDTH] IN BLOOD BY AUTOMATED COUNT: 12.5 %
HCT VFR BLD AUTO: 39.7 %
HGB BLD-MCNC: 13.6 G/DL
IMM GRANULOCYTES # BLD AUTO: 0.05 X10(3) UL (ref 0–1)
IMM GRANULOCYTES NFR BLD: 0.8 %
LYMPHOCYTES # BLD AUTO: 1.88 X10(3) UL (ref 1–4)
LYMPHOCYTES NFR BLD AUTO: 31.9 %
MCH RBC QN AUTO: 31.3 PG (ref 26–34)
MCHC RBC AUTO-ENTMCNC: 34.3 G/DL (ref 31–37)
MCV RBC AUTO: 91.3 FL
MONOCYTES # BLD AUTO: 0.55 X10(3) UL (ref 0.1–1)
MONOCYTES NFR BLD AUTO: 9.3 %
NEUTROPHILS # BLD AUTO: 3.26 X10 (3) UL (ref 1.5–7.7)
NEUTROPHILS # BLD AUTO: 3.26 X10(3) UL (ref 1.5–7.7)
NEUTROPHILS NFR BLD AUTO: 55.5 %
PLATELET # BLD AUTO: 300 10(3)UL (ref 150–450)
RBC # BLD AUTO: 4.35 X10(6)UL
WBC # BLD AUTO: 5.9 X10(3) UL (ref 4–11)

## 2024-01-18 PROCEDURE — 36415 COLL VENOUS BLD VENIPUNCTURE: CPT

## 2024-01-18 PROCEDURE — 84443 ASSAY THYROID STIM HORMONE: CPT

## 2024-01-18 PROCEDURE — 80053 COMPREHEN METABOLIC PANEL: CPT

## 2024-01-18 PROCEDURE — 85025 COMPLETE CBC W/AUTO DIFF WBC: CPT

## 2024-01-18 PROCEDURE — 84439 ASSAY OF FREE THYROXINE: CPT

## 2024-01-18 PROCEDURE — 80061 LIPID PANEL: CPT

## 2024-01-19 LAB
ALBUMIN SERPL-MCNC: 4.1 G/DL (ref 3.4–5)
ALBUMIN/GLOB SERPL: 1.1 {RATIO} (ref 1–2)
ALP LIVER SERPL-CCNC: 80 U/L
ALT SERPL-CCNC: 76 U/L
ANION GAP SERPL CALC-SCNC: 6 MMOL/L (ref 0–18)
AST SERPL-CCNC: 43 U/L (ref 15–37)
BILIRUB SERPL-MCNC: 0.6 MG/DL (ref 0.1–2)
BUN BLD-MCNC: 15 MG/DL (ref 9–23)
CALCIUM BLD-MCNC: 9.6 MG/DL (ref 8.5–10.1)
CHLORIDE SERPL-SCNC: 109 MMOL/L (ref 98–112)
CHOLEST SERPL-MCNC: 188 MG/DL (ref ?–200)
CO2 SERPL-SCNC: 27 MMOL/L (ref 21–32)
CREAT BLD-MCNC: 0.96 MG/DL
EGFRCR SERPLBLD CKD-EPI 2021: 70 ML/MIN/1.73M2 (ref 60–?)
FASTING PATIENT LIPID ANSWER: YES
FASTING STATUS PATIENT QL REPORTED: YES
GLOBULIN PLAS-MCNC: 3.7 G/DL (ref 2.8–4.4)
GLUCOSE BLD-MCNC: 88 MG/DL (ref 70–99)
HDLC SERPL-MCNC: 40 MG/DL (ref 40–59)
LDLC SERPL CALC-MCNC: 121 MG/DL (ref ?–100)
NONHDLC SERPL-MCNC: 148 MG/DL (ref ?–130)
OSMOLALITY SERPL CALC.SUM OF ELEC: 294 MOSM/KG (ref 275–295)
POTASSIUM SERPL-SCNC: 4 MMOL/L (ref 3.5–5.1)
PROT SERPL-MCNC: 7.8 G/DL (ref 6.4–8.2)
SODIUM SERPL-SCNC: 142 MMOL/L (ref 136–145)
T4 FREE SERPL-MCNC: 1 NG/DL (ref 0.8–1.7)
TRIGL SERPL-MCNC: 153 MG/DL (ref 30–149)
TSI SER-ACNC: 2.14 MIU/ML (ref 0.36–3.74)
VLDLC SERPL CALC-MCNC: 27 MG/DL (ref 0–30)

## 2024-01-19 NOTE — PROGRESS NOTES
Liver inflammation slightly worse than before.    Repeat in 3 months, prior to your upcoming office visit.  Please call with any questions,    Guerrero Alejo MD

## 2024-01-22 ENCOUNTER — OFFICE VISIT (OUTPATIENT)
Dept: FAMILY MEDICINE CLINIC | Facility: CLINIC | Age: 54
End: 2024-01-22
Payer: COMMERCIAL

## 2024-01-22 VITALS
OXYGEN SATURATION: 98 % | HEART RATE: 98 BPM | SYSTOLIC BLOOD PRESSURE: 100 MMHG | BODY MASS INDEX: 30.08 KG/M2 | HEIGHT: 64 IN | DIASTOLIC BLOOD PRESSURE: 80 MMHG | TEMPERATURE: 97 F | WEIGHT: 176.19 LBS

## 2024-01-22 DIAGNOSIS — R41.840 DIFFICULTY CONCENTRATING: ICD-10-CM

## 2024-01-22 DIAGNOSIS — Z79.899 ENCOUNTER FOR LONG-TERM CURRENT USE OF MEDICATION: ICD-10-CM

## 2024-01-22 DIAGNOSIS — Z51.81 THERAPEUTIC DRUG MONITORING: Primary | ICD-10-CM

## 2024-01-22 DIAGNOSIS — E78.2 MIXED HYPERLIPIDEMIA: ICD-10-CM

## 2024-01-22 DIAGNOSIS — F33.1 MAJOR DEPRESSIVE DISORDER, RECURRENT EPISODE, MODERATE WITH ANXIOUS DISTRESS (HCC): ICD-10-CM

## 2024-01-22 DIAGNOSIS — Z23 NEED FOR VACCINATION: ICD-10-CM

## 2024-01-22 PROCEDURE — 3074F SYST BP LT 130 MM HG: CPT | Performed by: FAMILY MEDICINE

## 2024-01-22 PROCEDURE — 3008F BODY MASS INDEX DOCD: CPT | Performed by: FAMILY MEDICINE

## 2024-01-22 PROCEDURE — 90472 IMMUNIZATION ADMIN EACH ADD: CPT | Performed by: FAMILY MEDICINE

## 2024-01-22 PROCEDURE — 90471 IMMUNIZATION ADMIN: CPT | Performed by: FAMILY MEDICINE

## 2024-01-22 PROCEDURE — 90746 HEPB VACCINE 3 DOSE ADULT IM: CPT | Performed by: FAMILY MEDICINE

## 2024-01-22 PROCEDURE — 3079F DIAST BP 80-89 MM HG: CPT | Performed by: FAMILY MEDICINE

## 2024-01-22 PROCEDURE — 90686 IIV4 VACC NO PRSV 0.5 ML IM: CPT | Performed by: FAMILY MEDICINE

## 2024-01-22 PROCEDURE — 99214 OFFICE O/P EST MOD 30 MIN: CPT | Performed by: FAMILY MEDICINE

## 2024-01-22 PROCEDURE — 90632 HEPA VACCINE ADULT IM: CPT | Performed by: FAMILY MEDICINE

## 2024-01-22 RX ORDER — ESCITALOPRAM OXALATE 10 MG/1
10 TABLET ORAL DAILY
Qty: 90 TABLET | Refills: 1 | Status: SHIPPED | OUTPATIENT
Start: 2024-01-22

## 2024-01-22 RX ORDER — ALPRAZOLAM 0.5 MG/1
TABLET ORAL
Qty: 30 TABLET | Refills: 1 | Status: SHIPPED | OUTPATIENT
Start: 2024-01-22

## 2024-01-22 NOTE — PROGRESS NOTES
Aury Wallis is a 53 year old female.     Chief Complaint   Patient presents with    Depression    Anxiety    Lab Results    Hyperlipidemia         HPI:     Depression/anxiety:  Stable.  Patient taking generic Lexapro 10 mg daily.  Tries to infrequently take alprazolam when symptoms of anxiety are worsening, alprazolam gives her good relief.    Increase stress with family life.  Brother who lives in [place] is now homeless, has severe TBI.  Has another brother with significant problems.  Son has significant mental health issues.  Daughter has mental health issues as well.        Complaints of difficulty concentrating.          Wt Readings from Last 6 Encounters:   01/22/24 176 lb 3.2 oz (79.9 kg)   10/16/23 176 lb (79.8 kg)   10/13/23 175 lb 3.2 oz (79.5 kg)   06/29/23 180 lb 12.8 oz (82 kg)   04/26/23 182 lb (82.6 kg)   01/11/23 179 lb (81.2 kg)      Body mass index is 30.24 kg/m².        Current Outpatient Medications   Medication Sig Dispense Refill    ALPRAZolam 0.5 MG Oral Tab Take 0.5-1 tablets (0.25-0.5 mg total) by mouth daily as needed for Anxiety. 30 tablet 1    escitalopram 10 MG Oral Tab Take 1 tablet (10 mg total) by mouth daily. 90 tablet 1    metFORMIN  MG Oral Tablet 24 Hr Take 1 tablet (750 mg total) by mouth daily with food. 30 tablet 2    methocarbamol 750 MG Oral Tab TAKE 1 TABLET (750 MG TOTAL) BY MOUTH EVERY DAY AT BEDTIME      Calcium-Magnesium-Vitamin D (CALCIUM MAGNESIUM OR) Take by mouth. Liquid      Ascorbic Acid (VITAMIN C) 100 MG Oral Tab Take 1 tablet (100 mg total) by mouth daily.      cholecalciferol 125 MCG (5000 UT) Oral Tab Take 1 tablet (5,000 Units total) by mouth. 2 tab daily Mon-Friday to =10,000      Nutritional Supplements (ANTIOXIDANTS OR) Take 1 tablet by mouth daily. Take as directed        Past Medical History:   Diagnosis Date    Breast cancer (HCC) 05/2014    Invasive ductal CA// taking Tamoxifen until 08/2019 x5 years    Cervical polyp 07/15/2019     Cholelithiasis with acute on chronic cholecystitis without biliary obstruction 03/20/2018    Class 1 obesity due to excess calories without serious comorbidity with body mass index (BMI) of 31.0 to 31.9 in adult 07/15/2019    Depression     Diarrhea     since 03/17/20/ advised to notify surgeon or Pcp- 03/18/20    Disorder of liver     SOTO  fatty liver    Diverticulosis of colon 07/13/2021    Easy bruising     Elevated liver enzymes 11/16/2015    Exposure to medical diagnostic radiation     last 2014    Fatigue     Hearing impairment     70% hearing loss in left ear and some in right     Hearing loss     Hemorrhoids     History of depression     History of HPV infection 06/02/2015    Hx of colonoscopy 05/27/2021    Incarcerated umbilical hernia 03/20/2018    Insomnia 07/20/2015    Iron deficiency anemia due to chronic blood loss 10/25/2020    Irregular menstrual bleeding 03/06/2017    Menorrhagia with irregular cycle 01/24/2013    Night sweats     Normal delivery 02/14/1990    child birth    Normal delivery 10/19/1996    child birth    Overweight (BMI 25.0-29.9) 02/12/2018    Overweight with body mass index (BMI) of 28 to 28.9 in adult 05/25/2022    Primary malignant neoplasm of upper outer quadrant of left female breast (HCC) 11/16/2015    Sleep disturbance     Stress     Ventral incisional hernia without obstruction or gangrene 04/03/2018    Visual impairment     reading glasses    Weight gain       Past Surgical History:   Procedure Laterality Date    BREAST BIOPSY  6/27/2014    Procedure: BREAST BIOPSY;  Surgeon: Hugo Larios MD;  Location:  MAIN OR    CHOLECYSTECTOMY  03/28/2018    Dr Cason    COLONOSCOPY N/A 5/27/2021    Procedure: COLONOSCOPY, Surgeon: Camilo Cason MD;  Location: [facility]    HERNIA SURGERY  03/28/2018    ventral & umbilical hernia repair    LUMPECTOMY LEFT Left 06/02/2014    Invasive ductal CA    WILFREDO LOCALIZATION WIRE 1 SITE RIGHT (CPT=19281) Right 6/2014    Fibroadenoma     NEEDLE BIOPSY LEFT Left 2014    US guided biopsy - [facility]    OTHER  2020    dilation and curettage     OTHER SURGICAL HISTORY  age 11    right arm surgery    OTHER SURGICAL HISTORY  age 2    dental work under anesthesia    RADIATION LEFT Left     lump and radiation    TUBAL LIGATION  8-    [place], [place]      Social History:    Social History     Socioeconomic History    Marital status:     Number of children: 2   Occupational History    Occupation:  for [employer]     Employer: [employer]    Tobacco Use    Smoking status: Former     Packs/day: 1.00     Years: 15.00     Additional pack years: 0.00     Total pack years: 15.00     Types: Cigarettes     Quit date: 3/1/2003     Years since quittin.9     Passive exposure: Never    Smokeless tobacco: Never   Vaping Use    Vaping Use: Never used   Substance and Sexual Activity    Alcohol use: Yes     Alcohol/week: 0.0 standard drinks of alcohol     Comment: rarely    Drug use: No    Sexual activity: Not Currently   Other Topics Concern    Caffeine Concern Yes     Comment: 1 large sweet tea daily    Exercise No    Seat Belt Yes         Family History   Problem Relation Age of Onset    Breast Cancer Self 43    Glaucoma Mother     Diabetes Maternal Grandmother     Breast Cancer Maternal Grandmother 65        estimated age    Cancer Paternal Grandmother         breast    Breast Cancer Paternal Grandmother 65     REVIEW OF SYSTEMS:   GENERAL HEALTH: Overall feels well.  No fever.   SKIN: No complaints of any unusual skin lesions or rashes   RESPIRATORY: Denies: BESSY/TRAYLOR  CARDIOVASCULAR: Denies CP/palpitations  GI: No complaints of abdominal pain/nausea/vomiting/blood in stool/black stool/bloating/constipation/diarrhea  : No complaints of urinary symptoms  NEURO: denies headaches/dizziness  PSYCH: denies SI/HI    Immunization History   Administered Date(s) Administered    Covid-19 Vaccine Pfizer 30 mcg/0.3 ml  06/07/2021, 06/28/2021    FLULAVAL 6 months & older 0.5 ml Prefilled syringe (95162) 10/21/2020, 11/03/2022    FLUZONE 6 months and older PFS 0.5 ml ([id number]) 01/22/2024    HEP B, Adult 06/29/2023, 07/31/2023, 01/22/2024    Hep A, Adult 06/29/2023, 01/22/2024    TDAP 07/15/2019    Tb Intradermal Test 03/06/2017   Deferred Date(s) Deferred    Influenza Vaccine Refused 09/21/2020       EXAM:   /80 (BP Location: Right arm, Patient Position: Sitting, Cuff Size: adult)   Pulse 98   Temp 97 °F (36.1 °C)   Ht 5' 4\" (1.626 m)   Wt 176 lb 3.2 oz (79.9 kg)   LMP 10/25/2021 (Approximate)   SpO2 98%   Breastfeeding No   BMI 30.24 kg/m²   GENERAL: NAD, pleasant not acutely ill-appearing overweight  female  SKIN: no visible rashes  HEAD: NCAT  LUNGS: CTA A/P no wheezes/ronchi/rales/crackles  CARDIO: RRR, +S1/S2, no mm  VASCULAR: No lower extremity edema  NEURO: Alert and Oriented x3.  No gross motor or gross sensory abnormalities.  PSYCH: Affect normal.  Normal thought content.        DATA:    Cholesterol:     Lab Results   Component Value Date    CHOLEST 188 01/18/2024    CHOLEST 187 05/16/2022    CHOLEST 167 12/08/2020     Lab Results   Component Value Date    HDL 40 01/18/2024    HDL 48 (L) 05/16/2022    HDL 45 12/08/2020     Lab Results   Component Value Date    TRIG 153 (H) 01/18/2024    TRIG 88 05/16/2022    TRIG 111 12/08/2020     Lab Results   Component Value Date     (H) 01/18/2024     (H) 05/16/2022     (H) 12/08/2020     Lab Results   Component Value Date    AST 43 (H) 01/18/2024    AST 30 02/22/2023    AST 31 11/01/2022     Lab Results   Component Value Date    ALT 76 (H) 01/18/2024    ALT 54 02/22/2023    ALT 59 (H) 11/01/2022           ASSESSMENT AND PLAN:       Encounter Diagnoses   Name Primary?    Therapeutic drug monitoring Yes    Major depressive disorder, recurrent episode, moderate with anxious distress (HCC)     Encounter for long-term current use of medication      Difficulty concentrating     Mixed hyperlipidemia     Need for vaccination          1. Therapeutic drug monitoring  2. Major depressive disorder, recurrent episode, moderate with anxious distress (HCC)  Stable and doing well on generic Lexapro 10 mg daily which she will continue.  Very infrequently uses alprazolam.  Follow-up in 5 months when also due for annual wellness visit.    - ALPRAZolam 0.5 MG Oral Tab; Take 0.5-1 tablets (0.25-0.5 mg total) by mouth daily as needed for Anxiety.  Dispense: 30 tablet; Refill: 1  - escitalopram 10 MG Oral Tab; Take 1 tablet (10 mg total) by mouth daily.  Dispense: 90 tablet; Refill: 1    3. Encounter for long-term current use of medication  Medication use, risks, benefits, side effects and precautions discussed, patient verbalizes understanding. Questions encouraged and answered to patient's satisfaction.    - ALPRAZolam 0.5 MG Oral Tab; Take 0.5-1 tablets (0.25-0.5 mg total) by mouth daily as needed for Anxiety.  Dispense: 30 tablet; Refill: 1  - escitalopram 10 MG Oral Tab; Take 1 tablet (10 mg total) by mouth daily.  Dispense: 90 tablet; Refill: 1    4. Difficulty concentrating  Order placed for behavioral health navigator to assist patient with obtaining neuropsych testing.  Please assist patient with obtaining neuropsych testing for possible ADHD/ADD.  Please call patient at [phone], okay to leave a message if needed.    - Julio César Sun Navigator    5. Mixed hyperlipidemia  Mildly elevated LDL.    Very minimally elevated triglycerides.  Patient counseled on therapeutic lifestyle changes.    6. Need for vaccination  - Hepatitis A, Adult vaccine  - Hepatitis B Adult (20 and over)          Orders Placed This Encounter   Procedures    INFLUENZA VAC, QUAD, PRSV FREE, 0.5 ML    Hepatitis A, Adult vaccine    Hepatitis B Adult (20 and over)       Meds & Refills for this Visit:  Requested Prescriptions     Signed Prescriptions Disp Refills    ALPRAZolam 0.5 MG Oral Tab 30 tablet  1     Sig: Take 0.5-1 tablets (0.25-0.5 mg total) by mouth daily as needed for Anxiety.    escitalopram 10 MG Oral Tab 90 tablet 1     Sig: Take 1 tablet (10 mg total) by mouth daily.       Imaging & Consults:  INFLUENZA VAC, QUAD, PRSV FREE, 0.5 ML  HEPATITIS A VACCINE  HEPATITIS B VACCINE, OVER 20  BH NAVIGATOR    Return in about 5 months (around 6/22/2024) for Annuall wellness visit. Sooner if needed..

## 2024-02-05 ENCOUNTER — OFFICE VISIT (OUTPATIENT)
Dept: INTERNAL MEDICINE CLINIC | Facility: CLINIC | Age: 54
End: 2024-02-05
Payer: COMMERCIAL

## 2024-02-05 ENCOUNTER — TELEPHONE (OUTPATIENT)
Dept: INTERNAL MEDICINE CLINIC | Facility: CLINIC | Age: 54
End: 2024-02-05

## 2024-02-05 VITALS
WEIGHT: 175 LBS | DIASTOLIC BLOOD PRESSURE: 76 MMHG | HEART RATE: 97 BPM | HEIGHT: 64 IN | SYSTOLIC BLOOD PRESSURE: 110 MMHG | RESPIRATION RATE: 16 BRPM | BODY MASS INDEX: 29.88 KG/M2

## 2024-02-05 DIAGNOSIS — R73.01 IFG (IMPAIRED FASTING GLUCOSE): ICD-10-CM

## 2024-02-05 DIAGNOSIS — Z79.899 ENCOUNTER FOR LONG-TERM CURRENT USE OF MEDICATION: Primary | ICD-10-CM

## 2024-02-05 DIAGNOSIS — E78.00 HYPERCHOLESTEROLEMIA: ICD-10-CM

## 2024-02-05 DIAGNOSIS — E66.9 CLASS 1 OBESITY WITH BODY MASS INDEX (BMI) OF 30.0 TO 30.9 IN ADULT, UNSPECIFIED OBESITY TYPE, UNSPECIFIED WHETHER SERIOUS COMORBIDITY PRESENT: ICD-10-CM

## 2024-02-05 DIAGNOSIS — F41.9 ANXIETY AND DEPRESSION: ICD-10-CM

## 2024-02-05 DIAGNOSIS — K75.81 NASH (NONALCOHOLIC STEATOHEPATITIS): ICD-10-CM

## 2024-02-05 DIAGNOSIS — F32.A ANXIETY AND DEPRESSION: ICD-10-CM

## 2024-02-05 PROCEDURE — 99214 OFFICE O/P EST MOD 30 MIN: CPT | Performed by: PHYSICIAN ASSISTANT

## 2024-02-05 RX ORDER — METFORMIN HYDROCHLORIDE 750 MG/1
750 TABLET, EXTENDED RELEASE ORAL
Qty: 30 TABLET | Refills: 2 | Status: CANCELLED
Start: 2024-02-05

## 2024-02-05 RX ORDER — TIRZEPATIDE 2.5 MG/.5ML
2.5 INJECTION, SOLUTION SUBCUTANEOUS WEEKLY
Qty: 2 ML | Refills: 0 | Status: SHIPPED | OUTPATIENT
Start: 2024-02-05

## 2024-02-05 RX ORDER — ESCITALOPRAM OXALATE 10 MG/1
10 TABLET ORAL DAILY
Qty: 90 TABLET | Refills: 1 | Status: CANCELLED
Start: 2024-02-05

## 2024-02-05 NOTE — PROGRESS NOTES
HISTORY OF PRESENT ILLNESS  Chief Complaint   Patient presents with    Weight Check     Lost 1 pound       Aury Wallis is a 53 year old female here for follow up in medical weight loss program.   -1lb  Compliant on metformin  Denies chest pain, shortness of breath, dizziness, blurred vision, headache, paresthesia, nausea/vomiting.   Has been doing well with decreasing sugar  Struggles more with higher fat foods, red meat, fried chicken  Knows she needs to get veggies  Exercise/Activity: admits minimal  Nutrition: 24 hour food log reviewed, eating regular meals, +protein  Stress: manageable  Sleep: overnights, so that can mess with her sleep schedule      Wt Readings from Last 6 Encounters:   02/05/24 175 lb (79.4 kg)   01/22/24 176 lb 3.2 oz (79.9 kg)   10/16/23 176 lb (79.8 kg)   10/13/23 175 lb 3.2 oz (79.5 kg)   06/29/23 180 lb 12.8 oz (82 kg)   04/26/23 182 lb (82.6 kg)            Breakfast Lunch Dinner Snacks Fluids   Reviewed           REVIEW OF SYSTEMS  GENERAL HEALTH: feels well otherwise, denied any fevers chills or night sweats   RESPIRATORY: denies shortness of breath   CARDIOVASCULAR: denies chest pain  GI: denies abdominal pain    EXAM  /76   Pulse 97   Resp 16   Ht 5' 4\" (1.626 m)   Wt 175 lb (79.4 kg)   LMP 10/25/2021 (Approximate)   BMI 30.04 kg/m²   GENERAL: well developed, well nourished,in no apparent distress, A/O x3  SKIN: no rashes,no suspicious lesions  HEENT: atraumatic, normocephalic, OP-clear, PERRL  NECK: supple,no adenopathy  LUNGS: clear to auscultation bilaterally   CARDIO: RRR without murmur  EXTREMITIES: no cyanosis, no clubbing, no edema    Lab Results   Component Value Date    WBC 5.9 01/18/2024    RBC 4.35 01/18/2024    HGB 13.6 01/18/2024    HCT 39.7 01/18/2024    MCV 91.3 01/18/2024    MCH 31.3 01/18/2024    MCHC 34.3 01/18/2024    RDW 12.5 01/18/2024    .0 01/18/2024     Lab Results   Component Value Date    GLU 88 01/18/2024    BUN 15 01/18/2024     BUNCREA NOT APPLICABLE 05/16/2022    CREATSERUM 0.96 01/18/2024    ANIONGAP 6 01/18/2024    GFR 87 09/08/2017    GFRNAA 83 05/16/2022    GFRAA 96 05/16/2022    CA 9.6 01/18/2024    OSMOCALC 294 01/18/2024    ALKPHO 80 01/18/2024    AST 43 (H) 01/18/2024    ALT 76 (H) 01/18/2024    BILT 0.6 01/18/2024    TP 7.8 01/18/2024    ALB 4.1 01/18/2024    GLOBULIN 3.7 01/18/2024    AGRATIO 1.8 05/16/2022     01/18/2024    K 4.0 01/18/2024     01/18/2024    CO2 27.0 01/18/2024     No results found for: \"EAG\", \"A1C\"  Lab Results   Component Value Date    CHOLEST 188 01/18/2024    TRIG 153 (H) 01/18/2024    HDL 40 01/18/2024     (H) 01/18/2024    VLDL 27 01/18/2024    TCHDLRATIO 3.9 05/16/2022    NONHDLC 148 (H) 01/18/2024     Lab Results   Component Value Date    T4F 1.0 01/18/2024    TSH 2.140 01/18/2024     Lab Results   Component Value Date    B12 518 12/08/2020     Lab Results   Component Value Date    VITD 48.5 12/08/2020       Current Outpatient Medications on File Prior to Visit   Medication Sig Dispense Refill    ALPRAZolam 0.5 MG Oral Tab Take 0.5-1 tablets (0.25-0.5 mg total) by mouth daily as needed for Anxiety. 30 tablet 1    escitalopram 10 MG Oral Tab Take 1 tablet (10 mg total) by mouth daily. 90 tablet 1    metFORMIN  MG Oral Tablet 24 Hr Take 1 tablet (750 mg total) by mouth daily with food. 30 tablet 2    Calcium-Magnesium-Vitamin D (CALCIUM MAGNESIUM OR) Take by mouth. Liquid      Ascorbic Acid (VITAMIN C) 100 MG Oral Tab Take 1 tablet (100 mg total) by mouth daily.      cholecalciferol 125 MCG (5000 UT) Oral Tab Take 1 tablet (5,000 Units total) by mouth. 2 tab daily Mon-Friday to =10,000      Nutritional Supplements (ANTIOXIDANTS OR) Take 1 tablet by mouth daily. Take as directed      methocarbamol 750 MG Oral Tab TAKE 1 TABLET (750 MG TOTAL) BY MOUTH EVERY DAY AT BEDTIME (Patient not taking: Reported on 2/5/2024)       No current facility-administered medications on file prior to  visit.       ASSESSMENT  Analyzed weight data:       Diagnoses and all orders for this visit:    Encounter for long-term current use of medication  -     Tirzepatide-Weight Management (ZEPBOUND) 2.5 MG/0.5ML Subcutaneous Solution Auto-injector; Inject 2.5 mg into the skin once a week.    Class 1 obesity with body mass index (BMI) of 30.0 to 30.9 in adult, unspecified obesity type, unspecified whether serious comorbidity present  -     Tirzepatide-Weight Management (ZEPBOUND) 2.5 MG/0.5ML Subcutaneous Solution Auto-injector; Inject 2.5 mg into the skin once a week.    IFG (impaired fasting glucose)  -     Tirzepatide-Weight Management (ZEPBOUND) 2.5 MG/0.5ML Subcutaneous Solution Auto-injector; Inject 2.5 mg into the skin once a week.    SOTO (nonalcoholic steatohepatitis)  -     Tirzepatide-Weight Management (ZEPBOUND) 2.5 MG/0.5ML Subcutaneous Solution Auto-injector; Inject 2.5 mg into the skin once a week.    Hypercholesterolemia  -     Tirzepatide-Weight Management (ZEPBOUND) 2.5 MG/0.5ML Subcutaneous Solution Auto-injector; Inject 2.5 mg into the skin once a week.    Anxiety and depression        PLAN  Initial Weight: 182lbs  Initial Weight Date: 4/26/23  Today's Weight: 175lbs  5% Goal: 9.1lbs  10% Goal: 18.2lbs  Total Weight Loss: Down 1lb/Net loss 7lbs    Discontinue metformin  Will begin Zepbound 2.5mg weekly - denies any personal or family history of pancreatitis, pancreatic cancer, thyroid cancer, MEN2 - discussed MOA, advised side effects and adverse effects of medication.  IFG - continue to work on low carb diet  SOTO - low carb, low fat diet, incorporate more exercise  HLD - lifestyle changes  Mood is stable, continue current medications  Consistency with logging foods - protein and produce  Nutrition: low carb diet/ recommended to eat breakfast daily/ regular protein intake  Medication use and side effects reviewed with patient.  Medication contraindications: caution stimulant - anxiety   Follow up  with dietitian and psychologist as recommended.  Discussed the role of sleep and stress in weight management.  Counseled on comprehensive weight loss plan including attention to nutrition, exercise and behavior/stress management for success. See patient instruction below for more details.  Discussed strategies to overcome barriers to successful weight loss and weight maintenance  FITTE: ACSM recommendations (150-300 minutes/ week in active weight loss)   Weight Loss consent to treat reviewed and signed       NOTE TO PATIENT: The 21st Century Cures Act makes clinical notes like these available to patients in the interest of transparency. Clinical notes are medical documents used by physicians and care providers to communicate with each other. These documents include medical language and terminology, abbreviations, and treatment information that may sound technical and at times possibly unfamiliar. In addition, at times, the verbiage may appear blunt or direct. These documents are one tool providers use to communicate relevant information and clinical opinions of the care providers in a way that allows common understanding of the clinical context.     There are no Patient Instructions on file for this visit.    No follow-ups on file.    Patient verbalizes understanding.    Noemí Moreno PA-C  2/5/2024

## 2024-02-05 NOTE — TELEPHONE ENCOUNTER
Zepbound Approval Details    Authorized from January 6, 2024 to October 2, 2024     Mychart sent.

## 2024-02-07 ENCOUNTER — TELEPHONE (OUTPATIENT)
Dept: FAMILY MEDICINE CLINIC | Facility: CLINIC | Age: 54
End: 2024-02-07

## 2024-02-07 NOTE — TELEPHONE ENCOUNTER
Bernadette Murray, [facility]  Radhika Cantrell DO Hello Dr. Yarshen,    I received your navigation order for behavioral health services. I was able to reach your patient and discuss available options for behavioral care including neuropsychological testing.Pt declined referrals for neuropsychological resources at this time. Pt did express additional concerns regarding anxiety/stress/feeling overwhelmed and trouble coping. Due to these additional concerns we scheduled her with a [facility] provider for an initial evaluation. Pt will meet with PEPPER Dumont on 3/28.    Warm regards,  Bernadette Murray [facility]  Behavioral Health Navigator  [facility]  [phone]

## 2024-02-12 NOTE — TELEPHONE ENCOUNTER
Requesting   Requested Prescriptions     Pending Prescriptions Disp Refills    METFORMIN  MG Oral Tablet 24 Hr [Pharmacy Med Name: METFORMIN HCL  MG TABLET] 90 tablet 0     Sig: TAKE 1 TABLET (750 MG TOTAL) BY MOUTH DAILY WITH FOOD.     LOV: 2/5/24  RTC: not noted  Filled: 11/17/23 #30 with 2 refills    Future Appointments   Date Time Provider Department Center   3/28/2024  1:30 PM Светлана Aguirre APRN [facility]   4/16/2024  8:40 AM Guerrero Alejo MD [facility]   6/7/2024 12:40 PM Noemí Moreno PA-C [facility]

## 2024-02-13 RX ORDER — METFORMIN HYDROCHLORIDE 750 MG/1
750 TABLET, EXTENDED RELEASE ORAL
Qty: 90 TABLET | Refills: 0 | Status: SHIPPED | OUTPATIENT
Start: 2024-02-13

## 2024-02-26 ENCOUNTER — TELEPHONE (OUTPATIENT)
Dept: INTERNAL MEDICINE CLINIC | Facility: CLINIC | Age: 54
End: 2024-02-26

## 2024-02-26 NOTE — TELEPHONE ENCOUNTER
Requesting zepbound 2.5  LOV: 2/5/24   RTC: none on file  Filled: 2/5/24 #2ml with 0 refills    Future Appointments   Date Time Provider Department Center   3/28/2024  1:30 PM Светлана Aguirre APRN [facility]   4/16/2024  8:40 AM Guerrero Alejo MD [facility]   6/7/2024 12:40 PM Noemí Moreno PA-C [facility]     Patient requesting next dose

## 2024-03-03 RX ORDER — TIRZEPATIDE 2.5 MG/.5ML
2.5 INJECTION, SOLUTION SUBCUTANEOUS WEEKLY
Qty: 2 ML | Refills: 0 | Status: SHIPPED | OUTPATIENT
Start: 2024-03-03

## 2024-03-14 ENCOUNTER — TELEPHONE (OUTPATIENT)
Dept: INTERNAL MEDICINE CLINIC | Facility: CLINIC | Age: 54
End: 2024-03-14

## 2024-03-14 RX ORDER — TIRZEPATIDE 5 MG/.5ML
5 INJECTION, SOLUTION SUBCUTANEOUS WEEKLY
Qty: 2 ML | Refills: 0 | OUTPATIENT
Start: 2024-03-14

## 2024-03-14 NOTE — TELEPHONE ENCOUNTER
Patient left a message to call her about a refill.  There is a problem with the zepbound order at [facility]  I told patient I would call  2.5 mg repeating dose - probably approval needed  She had no tolerance issues and could move up.  I called [facility] to check it is quantity pa needed since repeating dose and does not have to  I asked her to run 5 mg and it does go through.  I called patient and let her know to discuss the coupon with her but it goes through insurance now.

## 2024-04-03 DIAGNOSIS — E66.9 CLASS 1 OBESITY WITH BODY MASS INDEX (BMI) OF 30.0 TO 30.9 IN ADULT, UNSPECIFIED OBESITY TYPE, UNSPECIFIED WHETHER SERIOUS COMORBIDITY PRESENT: Primary | ICD-10-CM

## 2024-04-03 NOTE — TELEPHONE ENCOUNTER
Patient left message she would like to increase her dose of zepbound    Requesting Zepbound  LOV: 2/5/24  RTC: not noted  Last Relevant Labs: na  Filled: 3/14/24 #2ml with 0 refills  5 mg dose    6/7/2024 12:40 PM Noemí Moreno PA-C [facility]

## 2024-04-04 RX ORDER — TIRZEPATIDE 7.5 MG/.5ML
7.5 INJECTION, SOLUTION SUBCUTANEOUS WEEKLY
Qty: 2 ML | Refills: 0 | Status: SHIPPED | OUTPATIENT
Start: 2024-04-04

## 2024-04-21 ENCOUNTER — APPOINTMENT (OUTPATIENT)
Dept: GENERAL RADIOLOGY | Age: 54
End: 2024-04-21
Attending: PHYSICIAN ASSISTANT
Payer: COMMERCIAL

## 2024-04-21 ENCOUNTER — HOSPITAL ENCOUNTER (OUTPATIENT)
Age: 54
Discharge: HOME | End: 2024-04-21
Payer: COMMERCIAL

## 2024-04-21 VITALS
TEMPERATURE: 99 F | HEIGHT: 62 IN | HEART RATE: 94 BPM | BODY MASS INDEX: 31.28 KG/M2 | OXYGEN SATURATION: 97 % | WEIGHT: 170 LBS | RESPIRATION RATE: 20 BRPM | SYSTOLIC BLOOD PRESSURE: 117 MMHG | DIASTOLIC BLOOD PRESSURE: 68 MMHG

## 2024-04-21 DIAGNOSIS — S89.92XA LOWER EXTREMITY INJURY, LEFT, INITIAL ENCOUNTER: Primary | ICD-10-CM

## 2024-04-21 PROCEDURE — 73630 X-RAY EXAM OF FOOT: CPT | Performed by: PHYSICIAN ASSISTANT

## 2024-04-21 PROCEDURE — 29515 APPLICATION SHORT LEG SPLINT: CPT

## 2024-04-21 PROCEDURE — 99214 OFFICE O/P EST MOD 30 MIN: CPT

## 2024-04-21 PROCEDURE — 73610 X-RAY EXAM OF ANKLE: CPT | Performed by: PHYSICIAN ASSISTANT

## 2024-04-21 NOTE — ED PROVIDER NOTES
Patient Seen in: [facility]      History     Chief Complaint   Patient presents with    Ankle Injury     Stated Complaint: ankle pain    Subjective:   HPI    52 yo  female here with complaint of pain and swelling to her left lower extremity.  Patient missed a step and rolled it.  Patient denies any further injury trauma or LOC.  Patient reports pain with weightbearing.  Patient denies chest pain, shortness of breath, cough, abdominal pain, nausea, vomiting or diarrhea.  Afebrile.    Objective:   Past Medical History:    Breast cancer (HCC)    Invasive ductal CA// taking Tamoxifen until 08/2019 x5 years    Cervical polyp    Cholelithiasis with acute on chronic cholecystitis without biliary obstruction    Class 1 obesity due to excess calories without serious comorbidity with body mass index (BMI) of 31.0 to 31.9 in adult    Depression    Diarrhea    since 03/17/20/ advised to notify surgeon or Pcp- 03/18/20    Disorder of liver    SOTO  fatty liver    Diverticulosis of colon    Easy bruising    Elevated liver enzymes    Exposure to medical diagnostic radiation    last 2014    Fatigue    Hearing impairment    70% hearing loss in left ear and some in right     Hearing loss    Hemorrhoids    History of depression    History of HPV infection    Hx of colonoscopy    Incarcerated umbilical hernia    Insomnia    Iron deficiency anemia due to chronic blood loss    Irregular menstrual bleeding    Menorrhagia with irregular cycle    Night sweats    Normal delivery (HCC)    child birth    Normal delivery (HCC)    child birth    Overweight (BMI 25.0-29.9)    Overweight with body mass index (BMI) of 28 to 28.9 in adult    Primary malignant neoplasm of upper outer quadrant of left female breast (HCC)    Sleep disturbance    Stress    Ventral incisional hernia without obstruction or gangrene    Visual impairment    reading glasses    Weight gain            The patient's medication list, past medical history and  social history elements  as listed in today's nurse's notes are reviewed and agree.   The patient's family history is reviewed and is noncontributory to the presenting problem, except as indicated as above.     Past Surgical History:   Procedure Laterality Date    Breast biopsy  06/27/2014    Procedure: BREAST BIOPSY;  Surgeon: Hugo Larios MD;  Location:  MAIN OR    Cholecystectomy  03/28/2018    Dr Cason    Colonoscopy N/A 05/27/2021    Procedure: COLONOSCOPY, Surgeon: Camilo Cason MD;  Location: [facility]    Eye surgery      Hernia surgery  03/28/2018    ventral & umbilical hernia repair    Lumpectomy left Left 06/02/2014    Invasive ductal CA    Isra localization wire 1 site right (cpt=19281) Right 06/2014    Fibroadenoma    Needle biopsy left Left 05/2014    US guided biopsy - [facility]    Other  03/2020    dilation and curettage     Other surgical history  age 11    right arm surgery    Other surgical history  age 2    dental work under anesthesia    Radiation left Left 2014    lump and radiation    Tubal ligation  10/19/1996    [place]                No pertinent social history.            Review of Systems    Positive for stated complaint: ankle pain  Other systems are as noted in HPI.  Constitutional and vital signs reviewed.      All other systems reviewed and negative except as noted above.    Physical Exam     ED Triage Vitals [04/21/24 1126]   /68   Pulse 94   Resp 20   Temp 99 °F (37.2 °C)   Temp src Temporal   SpO2 97 %   O2 Device None (Room air)       Current:/68   Pulse 94   Temp 99 °F (37.2 °C) (Temporal)   Resp 20   Ht 157.5 cm (5' 2\")   Wt 77.1 kg   LMP 10/25/2021 (Approximate)   SpO2 97%   BMI 31.09 kg/m²         Physical Exam  Vitals and nursing note reviewed.   Constitutional:       Appearance: Normal appearance. She is well-developed.   HENT:      Head: Normocephalic.      Right Ear: External ear normal.      Left Ear: External ear normal.       Nose: Nose normal.      Mouth/Throat:      Mouth: Mucous membranes are moist.   Eyes:      Conjunctiva/sclera: Conjunctivae normal.      Pupils: Pupils are equal, round, and reactive to light.   Cardiovascular:      Rate and Rhythm: Normal rate and regular rhythm.      Heart sounds: Normal heart sounds.   Pulmonary:      Effort: Pulmonary effort is normal.      Breath sounds: Normal breath sounds.   Musculoskeletal:      Cervical back: Normal range of motion and neck supple.      Right ankle: Normal.      Right Achilles Tendon: Normal.      Left ankle: Swelling and ecchymosis present.      Left Achilles Tendon: Normal.      Right foot: Normal.      Left foot: Swelling present.      Comments: LLE: N/V intact, strength 5/5   Skin:     General: Skin is warm.      Capillary Refill: Capillary refill takes less than 2 seconds.   Neurological:      General: No focal deficit present.      Mental Status: She is alert and oriented to person, place, and time.   Psychiatric:         Mood and Affect: Mood normal.         Behavior: Behavior normal.         Thought Content: Thought content normal.         Judgment: Judgment normal.              ED Course   I personally reviewed the xray images and and saw these findings: medial malleolus/distal fibula fracture  XR FOOT, COMPLETE (MIN 3 VIEWS), LEFT (CPT=73630)    Result Date: 4/21/2024  PROCEDURE:  XR FOOT, COMPLETE (MIN 3 VIEWS), LEFT (CPT=73630)  TECHNIQUE:  AP, oblique, and lateral views were obtained.  COMPARISON:  [facility], XR, XR ANKLE (MIN 3 VIEWS), LEFT (CPT=73610), 4/21/2024, 11:53 AM.  INDICATIONS:  ankle pain  PATIENT STATED HISTORY: (As transcribed by Technologist)  Patient states lateral/dorsal pain, swelling and bruising to the left ankle and foot after twisting ankle and falling early this morning. Unable to bear much weight.    FINDINGS:  Medial malleolus and distal fibula fractures are better defined on the ankle radiographs set.  No additional fracture or  dislocation of the foot.             CONCLUSION:  As above.   LOCATION:  [facility]   Dictated by (CST): Enrique Dukes MD on 4/21/2024 at 12:08 PM     Finalized by (CST): Enrique Dukes MD on 4/21/2024 at 12:09 PM       XR ANKLE (MIN 3 VIEWS), LEFT (CPT=73610)    Result Date: 4/21/2024  PROCEDURE:  XR ANKLE (MIN 3 VIEWS), LEFT (CPT=73610)  TECHNIQUE:  Three views were obtained.  COMPARISON:  None.  INDICATIONS:  ankle pain  PATIENT STATED HISTORY: (As transcribed by Technologist)  Patient states lateral/dorsal pain, swelling and bruising to the left ankle and foot after twisting ankle and falling early this morning. Unable to bear much weight.    FINDINGS:  Joint spaces are normal.  Lucencies are present consistent with minimally displaced fracture of the medial malleolus and nondisplaced fracture of the distal fibula, below the level of the plafond.  Smooth talar dome.  Small corticated plantar heel spur.             CONCLUSION:  Distal fibula and medial malleolus fractures.   LOCATION:  [facility]   Dictated by (CST): Enrique Dukes MD on 4/21/2024 at 12:07 PM     Finalized by (CST): Enrique Dukes MD on 4/21/2024 at 12:08 PM           Site:LLE  Device:short leg post mold  N/V intact: Yes              MDM   Clinical Impression: LLE fracture  Course of Treatment:   Wear the post-mold as provided.  rest ice compression elevation.  Call Ortho on Monday for further evaluation and treatment.        The patient is encouraged to return if any concerning symptoms arise. Additional verbal discharge instructions are given and discussed. Discharge medications are discussed. The patient is in good condition throughout the visit today and remains so upon discharge. I discuss the plan of care with the patient, who expresses understanding. All questions and concerns are addressed to the patient's satisfaction prior to discharge today.  Previous conversations with PCP and charts were reviewed.                                           Disposition  and Plan     Clinical Impression:  1. Lower extremity injury, left, initial encounter         Disposition:  Discharge  4/21/2024 12:35 pm    Follow-up:  Radhika Cantrell DO  [address] [facility] [address]  [facility] [address] [phone]          David Lance, PA  [address] [place] [id number] [phone]                Medications Prescribed:  Current Discharge Medication List

## 2024-04-21 NOTE — DISCHARGE INSTRUCTIONS
Please return to the ER/clinic if symptoms worsen. Follow-up with your PCP in 24-48 hours as needed.    Wear the post-mold as provided.  rest ice compression elevation.  Call Ortho on Monday for further evaluation and treatment.

## 2024-04-21 NOTE — ED INITIAL ASSESSMENT (HPI)
Pt here c/o lt ankle injury.   States missed a step walking down stairs last night, rolled her ankle and fell.   Denies hitting head.   C/o pain to lt ankle, swelling/bruising noted.

## 2024-04-22 ENCOUNTER — TELEPHONE (OUTPATIENT)
Dept: ORTHOPEDICS CLINIC | Facility: CLINIC | Age: 54
End: 2024-04-22

## 2024-04-22 NOTE — TELEPHONE ENCOUNTER
Patient is scheduled for a left ankle fracture. Please advise if imaging is needed.       Future Appointments   Date Time Provider Department Center   4/23/2024  2:30 PM Babrara Flaherty APRN [facility]   4/24/2024 11:30 AM [facility]   6/7/2024 12:40 PM Noemí Moreno PA-C [facility]       
exacerbation (HCC)     Diarrhea     Chronic headache     Facial asymmetry     Asymptomatic bacteriuria     Hyperglycemia     EDINSON (acute kidney injury) ([facility] [[email or web address]

## 2024-04-24 ENCOUNTER — OFFICE VISIT (OUTPATIENT)
Dept: ORTHOPEDICS CLINIC | Facility: CLINIC | Age: 54
End: 2024-04-24
Payer: COMMERCIAL

## 2024-04-24 VITALS — HEIGHT: 62 IN | BODY MASS INDEX: 31.28 KG/M2 | WEIGHT: 170 LBS

## 2024-04-24 DIAGNOSIS — S82.842A CLOSED BIMALLEOLAR FRACTURE OF LEFT ANKLE, INITIAL ENCOUNTER: Primary | ICD-10-CM

## 2024-04-24 PROCEDURE — 27808 TREATMENT OF ANKLE FRACTURE: CPT | Performed by: ORTHOPAEDIC SURGERY

## 2024-04-24 PROCEDURE — 99204 OFFICE O/P NEW MOD 45 MIN: CPT | Performed by: ORTHOPAEDIC SURGERY

## 2024-04-24 NOTE — H&P
[facility] - ORTHOPEDICS  [address] [place] [id number]  [phone]     NEW PATIENT VISIT - HISTORY AND PHYSICAL EXAMINATION     Name: Aury Wallis   MRN: [id number]  Date: 4/24/2024     CC: Left ankle pain.     REFERRED BY: Radhika Cantrell DO    HPI:   Aury Wallis is a very pleasant 53 year old female who presents today for evaluation, consultation, and management of left ankle injury that occurred after a fall down stairs on April 21, 2024.  She has since had persistent swelling, stiffness and instability.  She rates her pain to be a 6 out of 10 notes less than 50% normal function.  She was initially splinted and this was removed at today's clinical appt.     She works as a , and self referred - as her  works for [employer].     PMH:   Past Medical History:    Breast cancer (HCC)    Invasive ductal CA// taking Tamoxifen until 08/2019 x5 years    Cervical polyp    Cholelithiasis with acute on chronic cholecystitis without biliary obstruction    Class 1 obesity due to excess calories without serious comorbidity with body mass index (BMI) of 31.0 to 31.9 in adult    Depression    Diarrhea    since 03/17/20/ advised to notify surgeon or Pcp- 03/18/20    Disorder of liver    SOOT  fatty liver    Diverticulosis of colon    Easy bruising    Elevated liver enzymes    Exposure to medical diagnostic radiation    last 2014    Fatigue    Hearing impairment    70% hearing loss in left ear and some in right     Hearing loss    Hemorrhoids    History of depression    History of HPV infection    Hx of colonoscopy    Incarcerated umbilical hernia    Insomnia    Iron deficiency anemia due to chronic blood loss    Irregular menstrual bleeding    Menorrhagia with irregular cycle    Night sweats    Normal delivery (HCC)    child birth    Normal delivery (HCC)    child birth    Overweight (BMI 25.0-29.9)    Overweight with body mass index (BMI) of 28 to 28.9 in adult     Primary malignant neoplasm of upper outer quadrant of left female breast (HCC)    Sleep disturbance    Stress    Ventral incisional hernia without obstruction or gangrene    Visual impairment    reading glasses    Weight gain       PAST SURGICAL HX:  Past Surgical History:   Procedure Laterality Date    Breast biopsy  06/27/2014    Procedure: BREAST BIOPSY;  Surgeon: Hugo Larios MD;  Location:  MAIN OR    Cholecystectomy  03/28/2018    Dr Cason    Colonoscopy N/A 05/27/2021    Procedure: COLONOSCOPY, Surgeon: Camilo Cason MD;  Location: [facility]    Eye surgery      Hernia surgery  03/28/2018    ventral & umbilical hernia repair    Lumpectomy left Left 06/02/2014    Invasive ductal CA    Isra localization wire 1 site right (cpt=19281) Right 06/2014    Fibroadenoma    Needle biopsy left Left 05/2014    US guided biopsy - [facility]    Other  03/2020    dilation and curettage     Other surgical history  age 11    right arm surgery    Other surgical history  age 2    dental work under anesthesia    Radiation left Left 2014    lump and radiation    Tubal ligation  10/19/1996    [place], [place]       FAMILY HX:  Family History   Problem Relation Age of Onset    Other (Suspected Bipolar) Mother     Glaucoma Mother     Alcohol and Other Disorders Associated Mother     Diabetes Maternal Grandmother     Breast Cancer Maternal Grandmother 65        estimated age    Cancer Paternal Grandmother         breast    Breast Cancer Paternal Grandmother 65    Breast Cancer Self 45    Anxiety Daughter     Other (borderline) Daughter     Bipolar Disorder Daughter     Other (IED) Son     Other (violent) Son     Depression Son        ALLERGIES:  Augmentin [amoxicillin-pot clavulanate], Tegaderm chg dressing [chlorhexidine], Adhesive tape, and Penicillins    MEDICATIONS:   Current Outpatient Medications   Medication Sig Dispense Refill    buPROPion  MG Oral Tablet 24 Hr Take 1 tablet (150 mg total) by mouth  every morning. 90 tablet 0    Tirzepatide-Weight Management (ZEPBOUND) 7.5 MG/0.5ML Subcutaneous Solution Auto-injector Inject 7.5 mg into the skin once a week. 2 mL 0    Tirzepatide-Weight Management (ZEPBOUND) 5 MG/0.5ML Subcutaneous Solution Auto-injector Inject 5 mg into the skin once a week. 2 mL 0    metFORMIN  MG Oral Tablet 24 Hr Take 1 tablet (750 mg total) by mouth daily with food. 90 tablet 0    ALPRAZolam 0.5 MG Oral Tab Take 0.5-1 tablets (0.25-0.5 mg total) by mouth daily as needed for Anxiety. 30 tablet 1    escitalopram 10 MG Oral Tab Take 1 tablet (10 mg total) by mouth daily. 90 tablet 1    Calcium-Magnesium-Vitamin D (CALCIUM MAGNESIUM OR) Take by mouth. Liquid      Ascorbic Acid (VITAMIN C) 100 MG Oral Tab Take 1 tablet (100 mg total) by mouth daily.      cholecalciferol 125 MCG (5000 UT) Oral Tab Take 1 tablet (5,000 Units total) by mouth. 2 tab daily Mon-Friday to =10,000      Nutritional Supplements (ANTIOXIDANTS OR) Take 1 tablet by mouth daily. Take as directed      methocarbamol 750 MG Oral Tab TAKE 1 TABLET (750 MG TOTAL) BY MOUTH EVERY DAY AT BEDTIME (Patient not taking: Reported on 2024)         ROS: A comprehensive 14 point review of systems was performed and was negative aside from the aforementioned per history of present illness.    SOCIAL HX:  Social History     Occupational History    Occupation:  for [employer]     Employer: [employer]    Tobacco Use    Smoking status: Former     Current packs/day: 0.00     Average packs/day: 1 pack/day for 15.0 years (15.0 ttl pk-yrs)     Types: Cigarettes     Start date: 3/1/1988     Quit date: 3/1/2003     Years since quittin.1     Passive exposure: Never    Smokeless tobacco: Never   Vaping Use    Vaping status: Never Used   Substance and Sexual Activity    Alcohol use: Yes     Alcohol/week: 0.0 standard drinks of alcohol     Comment: rarely    Drug use: No    Sexual activity: Not Currently       PE:    Vitals:    04/24/24 1119   Weight: 170 lb (77.1 kg)   Height: 5' 2\" (1.575 m)     Estimated body mass index is 31.09 kg/m² as calculated from the following:    Height as of this encounter: 5' 2\" (1.575 m).    Weight as of this encounter: 170 lb (77.1 kg).    Physical Exam  Constitutional:       Appearance: Normal appearance.   HENT:      Head: Normocephalic and atraumatic.   Eyes:      Extraocular Movements: Extraocular movements intact.   Neck:      Musculoskeletal: Normal range of motion and neck supple.   Cardiovascular:      Pulses: Normal pulses.   Pulmonary:      Effort: Pulmonary effort is normal. No respiratory distress.   Abdominal:      General: There is no distension.   Skin:     General: Skin is warm.      Capillary Refill: Capillary refill takes less than 2 seconds.      Findings: No bruising.   Neurological:      General: No focal deficit present.      Mental Status: Alert.   Psychiatric:         Mood and Affect: Mood normal.     Examination of the left  ankle demonstrates:     Skin is intact, warm and dry.     Inspection:   Atrophy: none    Effusion: none    Edema: moderate    Erythema: none    Hematoma: none      Palpation:   Tenderness at fracture site.     ROM:   Not tested d/t nature of injury and fracture.     Strength: Not tested d/t nature of injury and fracture.     Special Tests:   Not tested d/t nature of injury and fracture.     No obvious peripheral edema noted.   Distal neurovascular exam demonstrates normal perfusion, intact sensation to light touch and full strength.     Examination of the contralateral extremity demonstrates:  No significant atrophy, swelling or effusion. Full range of motion. Neurovascularly intact distally.  The contralateral upper extremity is without limitation in range of motion or strength, no positive provocative maneuvers.       Radiographic Examination/Diagnostics:  I personally viewed, independently interpreted and radiology report was reviewed.      XR  FOOT, COMPLETE (MIN 3 VIEWS), LEFT (CPT=73630)    Result Date: 4/21/2024  PROCEDURE:  XR FOOT, COMPLETE (MIN 3 VIEWS), LEFT (CPT=73630)  TECHNIQUE:  AP, oblique, and lateral views were obtained.  COMPARISON:  [facility], XR, XR ANKLE (MIN 3 VIEWS), LEFT (CPT=73610), 4/21/2024, 11:53 AM.  INDICATIONS:  ankle pain  PATIENT STATED HISTORY: (As transcribed by Technologist)  Patient states lateral/dorsal pain, swelling and bruising to the left ankle and foot after twisting ankle and falling early this morning. Unable to bear much weight.    FINDINGS:  Medial malleolus and distal fibula fractures are better defined on the ankle radiographs set.  No additional fracture or dislocation of the foot.             CONCLUSION:  As above.   LOCATION:  [facility]   Dictated by (CST): Enrique Dukes MD on 4/21/2024 at 12:08 PM     Finalized by (CST): Enrique Dukes MD on 4/21/2024 at 12:09 PM       XR ANKLE (MIN 3 VIEWS), LEFT (CPT=73610)    Result Date: 4/21/2024  PROCEDURE:  XR ANKLE (MIN 3 VIEWS), LEFT (CPT=73610)  TECHNIQUE:  Three views were obtained.  COMPARISON:  None.  INDICATIONS:  ankle pain  PATIENT STATED HISTORY: (As transcribed by Technologist)  Patient states lateral/dorsal pain, swelling and bruising to the left ankle and foot after twisting ankle and falling early this morning. Unable to bear much weight.    FINDINGS:  Joint spaces are normal.  Lucencies are present consistent with minimally displaced fracture of the medial malleolus and nondisplaced fracture of the distal fibula, below the level of the plafond.  Smooth talar dome.  Small corticated plantar heel spur.             CONCLUSION:  Distal fibula and medial malleolus fractures.   LOCATION:  [facility]   Dictated by (CST): Enrique Dukes MD on 4/21/2024 at 12:07 PM     Finalized by (CST): Enrique Dukes MD on 4/21/2024 at 12:08 PM       Independent review: Demonstrates nondisplaced medial malleolus fracture and minimally displaced distal fibular fracture without joint space  widening.     IMPRESSION: Aury Wallis is a 53 year old female who presents with left nondisplaced bimalleolar ankle fracture (distal fibula and medial malleolus) sustained April 21, 2024 after a fall down the stairs.     We elected to maximize nonsurgical management with a splint for 2 weeks and follow up with repeat x-rays.     PLAN:   We had a detailed discussion outlining the etiology, anatomy, pathophysiology, and natural history of the patient's findings. Imaging was reviewed in detail and correlated to a 3-dimensional model of the patient's pathology.     We reviewed the treatment of this disease condition.  I recommend a splint for an additional 2 weeks. At that point, we will obtain repeat x-rays.      The patient had the opportunity to ask questions and all questions were answered appropriately.       FOLLOW-UP:  Return to clinic in two weeks with David Lance. X-rays of the right ankle out of the splint required at next visit.           Shauna Rhodes MD  Knee, Shoulder, & Elbow Surgery / Sports Medicine Specialist  Orthopaedic Surgery  [address] [place] [address].org  [email or web address]  t: [phone]  o: [phone]  f: [phone]     This note was dictated using Dragon software.  While it was briefly proofread prior to completion, some grammatical, spelling, and word choice errors due to dictation may still occur.

## 2024-05-02 ENCOUNTER — TELEPHONE (OUTPATIENT)
Dept: ORTHOPEDICS CLINIC | Facility: CLINIC | Age: 54
End: 2024-05-02

## 2024-05-02 DIAGNOSIS — S82.842A CLOSED BIMALLEOLAR FRACTURE OF LEFT ANKLE, INITIAL ENCOUNTER: Primary | ICD-10-CM

## 2024-05-08 ENCOUNTER — OFFICE VISIT (OUTPATIENT)
Dept: ORTHOPEDICS CLINIC | Facility: CLINIC | Age: 54
End: 2024-05-08
Payer: COMMERCIAL

## 2024-05-08 ENCOUNTER — HOSPITAL ENCOUNTER (OUTPATIENT)
Dept: GENERAL RADIOLOGY | Age: 54
Discharge: HOME | End: 2024-05-08
Attending: PHYSICIAN ASSISTANT
Payer: COMMERCIAL

## 2024-05-08 DIAGNOSIS — S82.842A CLOSED BIMALLEOLAR FRACTURE OF LEFT ANKLE, INITIAL ENCOUNTER: ICD-10-CM

## 2024-05-08 DIAGNOSIS — S82.842D CLOSED BIMALLEOLAR FRACTURE OF LEFT ANKLE WITH ROUTINE HEALING, SUBSEQUENT ENCOUNTER: Primary | ICD-10-CM

## 2024-05-08 PROCEDURE — 73610 X-RAY EXAM OF ANKLE: CPT | Performed by: PHYSICIAN ASSISTANT

## 2024-05-08 PROCEDURE — 99024 POSTOP FOLLOW-UP VISIT: CPT | Performed by: PHYSICIAN ASSISTANT

## 2024-05-08 RX ORDER — ASPIRIN 81 MG/1
81 TABLET, CHEWABLE ORAL DAILY
Qty: 30 TABLET | Refills: 0 | Status: SHIPPED | OUTPATIENT
Start: 2024-05-08 | End: 2024-06-07

## 2024-05-08 NOTE — PROGRESS NOTES
[facility] - ORTHOPEDICS  [address] [place] [id number]  [phone]       Name: Aury Wallis   MRN: [id number]  Date: 5/8/2024     REASON FOR VISIT: Follow up for left nondisplaced bimalleolar ankle fracture (distal fibula and medial malleolus) sustained April 21, 2024 after a fall down the stairs.     INTERVAL HISTORY:  Aury Wallis is a 53 year old female who returns for evaluation of left nondisplaced bimalleolar ankle fracture (distal fibula and medial malleolus) sustained April 21, 2024 after a fall down the stairs.     To summarize, left ankle injury that occurred after a fall down stairs on April 21, 2024.  She has since had persistent swelling, stiffness and instability.  She rates her pain to be a 6 out of 10 notes less than 50% normal function.  She was initially splinted and this was removed at today's clinical appt.      She works as a , and self referred - as her  works for [employer]. At her last visit Dr. Rhoeds recommended nonoperative intervention.       ROS: ROS    PE:   There were no vitals filed for this visit.  Estimated body mass index is 31.09 kg/m² as calculated from the following:    Height as of 4/24/24: 5' 2\" (1.575 m).    Weight as of 4/24/24: 170 lb (77.1 kg).    Physical Exam  Constitutional:       Appearance: Normal appearance.   HENT:      Head: Normocephalic and atraumatic.   Eyes:      Extraocular Movements: Extraocular movements intact.   Neck:      Musculoskeletal: Normal range of motion and neck supple.   Cardiovascular:      Pulses: Normal pulses.   Pulmonary:      Effort: Pulmonary effort is normal. No respiratory distress.   Abdominal:      General: There is no distension.   Skin:     General: Skin is warm.      Capillary Refill: Capillary refill takes less than 2 seconds.      Findings: No bruising.   Neurological:      General: No focal deficit present.      Mental Status: She is alert.   Psychiatric:         Mood  and Affect: Mood normal.       Examination of the left  ankle demonstrates:      Skin is intact, warm and dry.      Inspection:   Atrophy: none    Effusion: none    Edema: moderate    Erythema: none    Hematoma: none       Palpation:   Tenderness at fracture site.      ROM:   Not tested d/t nature of injury and fracture.      Strength: Not tested d/t nature of injury and fracture.      Special Tests:   Not tested d/t nature of injury and fracture.      No obvious peripheral edema noted.   Distal neurovascular exam demonstrates normal perfusion, intact sensation to light touch and full strength.      Examination of the contralateral extremity demonstrates:  No significant atrophy, swelling or effusion. Full range of motion. Neurovascularly intact distally.  The contralateral upper extremity is without limitation in range of motion or strength, no positive provocative maneuvers.           Radiographic Examination/Diagnostics:    I personally viewed, independently interpreted and radiology report was reviewed.    XR FOOT, COMPLETE (MIN 3 VIEWS), LEFT (CPT=73630)    Result Date: 4/21/2024  PROCEDURE:  XR FOOT, COMPLETE (MIN 3 VIEWS), LEFT (CPT=73630)  TECHNIQUE:  AP, oblique, and lateral views were obtained.  COMPARISON:  [facility], XR, XR ANKLE (MIN 3 VIEWS), LEFT (CPT=73610), 4/21/2024, 11:53 AM.  INDICATIONS:  ankle pain  PATIENT STATED HISTORY: (As transcribed by Technologist)  Patient states lateral/dorsal pain, swelling and bruising to the left ankle and foot after twisting ankle and falling early this morning. Unable to bear much weight.    FINDINGS:  Medial malleolus and distal fibula fractures are better defined on the ankle radiographs set.  No additional fracture or dislocation of the foot.             CONCLUSION:  As above.   LOCATION:  [facility]   Dictated by (CST): Enrique Dukes MD on 4/21/2024 at 12:08 PM     Finalized by (CST): Enrique Dukes MD on 4/21/2024 at 12:09 PM       XR ANKLE (MIN 3 VIEWS), LEFT  (CPT=73610)    Result Date: 4/21/2024  PROCEDURE:  XR ANKLE (MIN 3 VIEWS), LEFT (CPT=73610)  TECHNIQUE:  Three views were obtained.  COMPARISON:  None.  INDICATIONS:  ankle pain  PATIENT STATED HISTORY: (As transcribed by Technologist)  Patient states lateral/dorsal pain, swelling and bruising to the left ankle and foot after twisting ankle and falling early this morning. Unable to bear much weight.    FINDINGS:  Joint spaces are normal.  Lucencies are present consistent with minimally displaced fracture of the medial malleolus and nondisplaced fracture of the distal fibula, below the level of the plafond.  Smooth talar dome.  Small corticated plantar heel spur.             CONCLUSION:  Distal fibula and medial malleolus fractures.   LOCATION:  [facility]   Dictated by (CST): Enrique Dukes MD on 4/21/2024 at 12:07 PM     Finalized by (CST): Enrique Dukes MD on 4/21/2024 at 12:08 PM         IMPRESSION: Aury Wallis is a 53 year old female who presented for follow up of  left nondisplaced bimalleolar ankle fracture (distal fibula and medial malleolus) sustained April 21, 2024 after a fall down the stairs.     PLAN:   We had a detailed discussion outlining the etiology, anatomy, pathophysiology, and natural history of the patient's findings.    We reviewed the treatment of this disease condition.  We discussed cast immobilization nonweightbearing for four weeks. Repeat x-rays inf our weeks. We discussed Bone Restore supplementation.     FOLLOW-UP:  4 weeks, repeat ankle x-rays out of cast.             David Lance [facility], PA-C Orthopedic Surgery / Sports Medicine Specialist  [facility]  [address] [place] [email or web address]  [email or web address]  t: [phone]  o: [phone]  f: [phone]    This note was dictated using Dragon software.  While it was briefly proofread prior to completion, some grammatical, spelling, and word choice errors due to dictation may still occur.

## 2024-06-12 ENCOUNTER — OFFICE VISIT (OUTPATIENT)
Dept: ORTHOPEDICS CLINIC | Facility: CLINIC | Age: 54
End: 2024-06-12
Payer: COMMERCIAL

## 2024-06-12 ENCOUNTER — TELEPHONE (OUTPATIENT)
Dept: ORTHOPEDICS CLINIC | Facility: CLINIC | Age: 54
End: 2024-06-12

## 2024-06-12 ENCOUNTER — HOSPITAL ENCOUNTER (OUTPATIENT)
Dept: GENERAL RADIOLOGY | Age: 54
Discharge: HOME | End: 2024-06-12
Attending: PHYSICIAN ASSISTANT
Payer: COMMERCIAL

## 2024-06-12 DIAGNOSIS — S82.842D CLOSED BIMALLEOLAR FRACTURE OF LEFT ANKLE WITH ROUTINE HEALING, SUBSEQUENT ENCOUNTER: Primary | ICD-10-CM

## 2024-06-12 DIAGNOSIS — S82.842D CLOSED BIMALLEOLAR FRACTURE OF LEFT ANKLE WITH ROUTINE HEALING, SUBSEQUENT ENCOUNTER: ICD-10-CM

## 2024-06-12 PROCEDURE — 73610 X-RAY EXAM OF ANKLE: CPT | Performed by: PHYSICIAN ASSISTANT

## 2024-06-12 PROCEDURE — 99024 POSTOP FOLLOW-UP VISIT: CPT | Performed by: PHYSICIAN ASSISTANT

## 2024-06-12 NOTE — PROGRESS NOTES
[facility] - ORTHOPEDICS  [address] [place] [address]  [phone]       Name: Aury Wallis   MRN: [id number]  Date: 6/12/2024     REASON FOR VISIT: Follow up for left nondisplaced bimalleolar ankle fracture (distal fibula and medial malleolus) sustained April 21, 2024 after a fall down the stairs.     INTERVAL HISTORY:  Aury Wallis is a 53 year old female who returns for evaluation of left nondisplaced bimalleolar ankle fracture (distal fibula and medial malleolus) sustained April 21, 2024 after a fall down the stairs.  Overall she is doing well.  At her last visit we recommended nutritional supplementation cast immobilization.  She presents today for follow-up.    ROS: ROS    PE:   There were no vitals filed for this visit.  Estimated body mass index is 31.09 kg/m² as calculated from the following:    Height as of 4/24/24: 5' 2\" (1.575 m).    Weight as of 4/24/24: 170 lb (77.1 kg).    Physical Exam  Constitutional:       Appearance: Normal appearance.   HENT:      Head: Normocephalic and atraumatic.   Eyes:      Extraocular Movements: Extraocular movements intact.   Neck:      Musculoskeletal: Normal range of motion and neck supple.   Cardiovascular:      Pulses: Normal pulses.   Pulmonary:      Effort: Pulmonary effort is normal. No respiratory distress.   Abdominal:      General: There is no distension.   Skin:     General: Skin is warm.      Capillary Refill: Capillary refill takes less than 2 seconds.      Findings: No bruising.   Neurological:      General: No focal deficit present.      Mental Status: She is alert.   Psychiatric:         Mood and Affect: Mood normal.     Examination of the left  ankle demonstrates:     Skin is intact, warm and dry.     Inspection:   Atrophy: none    Effusion: none    Edema: moderate    Erythema: none    Hematoma: none      Palpation:   Tenderness at fracture site.     ROM:   Not tested d/t nature of injury and fracture.      Strength: Not tested d/t nature of injury and fracture.     Special Tests:   Not tested d/t nature of injury and fracture.     No obvious peripheral edema noted.   Distal neurovascular exam demonstrates normal perfusion, intact sensation to light touch and full strength.     Examination of the contralateral extremity demonstrates:  No significant atrophy, swelling or effusion. Full range of motion. Neurovascularly intact distally.  The contralateral upper extremity is without limitation in range of motion or strength, no positive provocative maneuvers.       Radiographic Examination/Diagnostics:    I personally viewed, independently interpreted and radiology report was reviewed.    X-ray Left Ankle, 6/12/2024- stable findings, interval healing.     IMPRESSION: Aury Wallis is a 53 year old female who presented for follow up of 7.5 weeks s/p left nondisplaced bimalleolar ankle fracture (distal fibula and medial malleolus) sustained April 21, 2024 after a fall down the stairs.     PLAN:   We had a detailed discussion outlining the etiology, anatomy, pathophysiology, and natural history of the patient's findings.    We reviewed the treatment of this disease condition.  Transition out of boot in 2.5 weeks.  We recommended physical therapy to aid in strengthening, range of motion, functional improvement, and return to baseline activity.  The patient had opportunity to ask questions and all questions were answered appropriately.    FOLLOW-UP:  8 weeks, with repeat x-rays.             David Lance [facility], PA-C Orthopedic Surgery / Sports Medicine Specialist  [facility]  [address] [place] [id number]   [email or web address]  [email or web address]  t: [phone]  o: [phone]  f: [phone]    This note was dictated using Dragon software.  While it was briefly proofread prior to completion, some grammatical, spelling, and word choice errors due to dictation may still occur.

## 2024-06-17 ENCOUNTER — OFFICE VISIT (OUTPATIENT)
Dept: FAMILY MEDICINE CLINIC | Facility: CLINIC | Age: 54
End: 2024-06-17
Payer: COMMERCIAL

## 2024-06-17 VITALS
BODY MASS INDEX: 30.91 KG/M2 | HEIGHT: 62 IN | DIASTOLIC BLOOD PRESSURE: 62 MMHG | RESPIRATION RATE: 16 BRPM | TEMPERATURE: 98 F | OXYGEN SATURATION: 97 % | SYSTOLIC BLOOD PRESSURE: 114 MMHG | HEART RATE: 97 BPM | WEIGHT: 168 LBS

## 2024-06-17 DIAGNOSIS — Z00.00 ROUTINE GENERAL MEDICAL EXAMINATION AT A HEALTH CARE FACILITY: Primary | ICD-10-CM

## 2024-06-17 DIAGNOSIS — E66.09 CLASS 1 OBESITY DUE TO EXCESS CALORIES WITH SERIOUS COMORBIDITY AND BODY MASS INDEX (BMI) OF 30.0 TO 30.9 IN ADULT: ICD-10-CM

## 2024-06-17 PROBLEM — E66.811 CLASS 1 OBESITY DUE TO EXCESS CALORIES WITH SERIOUS COMORBIDITY AND BODY MASS INDEX (BMI) OF 30.0 TO 30.9 IN ADULT: Status: ACTIVE | Noted: 2019-07-15

## 2024-06-17 PROCEDURE — 99396 PREV VISIT EST AGE 40-64: CPT | Performed by: FAMILY MEDICINE

## 2024-06-17 NOTE — PROGRESS NOTES
Chief Complaint   Patient presents with    Wellness Visit    Depression    Anxiety         HPI:   Aury Wallis is a 53 year old female     Patient is accompanied by her  who is pleasant and supportive.    Status post cataract surgery:  Right eye 4/18/2024.  Left eye 5/2/2024.      Bimaleolar fracture of right ankle.  Did not require surgery.  Will be starting PT July 2024.  Still nonweightbearing.      Last colonoscopy: UTD  Last mammogram: due 12/29/2024      Wt Readings from Last 6 Encounters:   06/17/24 168 lb (76.2 kg)   04/24/24 170 lb (77.1 kg)   04/21/24 170 lb (77.1 kg)   02/05/24 175 lb (79.4 kg)   01/22/24 176 lb 3.2 oz (79.9 kg)   10/16/23 176 lb (79.8 kg)     Body mass index is 30.73 kg/m².       Patient Active Problem List   Diagnosis    History of HPV infection    Allergy to adhesive    Tinnitus    Class 1 obesity due to excess calories with serious comorbidity and body mass index (BMI) of 30.0 to 30.9 in adult    History of tamoxifen therapy    Major depressive disorder, recurrent episode, moderate with anxious distress (HCC)    Circadian rhythm sleep disorder, shift work type    Therapeutic drug monitoring    Encounter for long-term current use of medication    Diverticulosis of colon    Bilateral hearing loss    CKD (chronic kidney disease) stage 2, GFR 60-89 ml/min    Wears hearing aid    Elevated liver enzymes    History of breast cancer    Hypercholesterolemia    Episodic circadian rhythm sleep disorder, shift work type    SOTO (nonalcoholic steatohepatitis)    Chronic right shoulder pain    Mixed hyperlipidemia    Difficulty concentrating     Current Outpatient Medications   Medication Sig Dispense Refill    buPROPion  MG Oral Tablet 24 Hr Take 1 tablet (150 mg total) by mouth every morning. 90 tablet 0    ALPRAZolam 0.5 MG Oral Tab Take 0.5-1 tablets (0.25-0.5 mg total) by mouth daily as needed for Anxiety. 30 tablet 1    escitalopram 10 MG Oral Tab Take 1 tablet (10 mg total)  by mouth daily. 90 tablet 1    Calcium-Magnesium-Vitamin D (CALCIUM MAGNESIUM OR) Take by mouth. Liquid      Ascorbic Acid (VITAMIN C) 100 MG Oral Tab Take 1 tablet (100 mg total) by mouth daily.      cholecalciferol 125 MCG (5000 UT) Oral Tab Take 1 tablet (5,000 Units total) by mouth. 2 tab daily Mon-Friday to =10,000      Nutritional Supplements (ANTIOXIDANTS OR) Take 1 tablet by mouth daily. Take as directed        Past Medical History:    Breast cancer (HCC)    Invasive ductal CA// taking Tamoxifen until 08/2019 x5 years    Cervical polyp    Cholelithiasis with acute on chronic cholecystitis without biliary obstruction    Class 1 obesity due to excess calories without serious comorbidity with body mass index (BMI) of 31.0 to 31.9 in adult    Depression    Diarrhea    since 03/17/20/ advised to notify surgeon or Pcp- 03/18/20    Disorder of liver    SOTO  fatty liver    Diverticulosis of colon    Easy bruising    Elevated liver enzymes    Exposure to medical diagnostic radiation    last 2014    Fatigue    Hearing impairment    70% hearing loss in left ear and some in right     Hearing loss    Hemorrhoids    History of depression    History of HPV infection    Hx of colonoscopy    Incarcerated umbilical hernia    Insomnia    Iron deficiency anemia due to chronic blood loss    Irregular menstrual bleeding    Menorrhagia with irregular cycle    Night sweats    Normal delivery (HCC)    child birth    Normal delivery (HCC)    child birth    Overweight (BMI 25.0-29.9)    Overweight with body mass index (BMI) of 28 to 28.9 in adult    Primary malignant neoplasm of upper outer quadrant of left female breast (HCC)    Sleep disturbance    Stress    Ventral incisional hernia without obstruction or gangrene    Visual impairment    reading glasses    Weight gain      Past Surgical History:   Procedure Laterality Date    Breast biopsy  06/27/2014    Procedure: BREAST BIOPSY;  Surgeon: Hugo Larios MD;  Location:  MAIN OR     Cataract extraction w/  intraocular lens implant Right 2024    Dr. Rodolfo Bautista    Cataract extraction w/  intraocular lens implant Left 2024    Dr. Rodolfo Bautista    Cholecystectomy  2018    Dr Cason    Colonoscopy N/A 2021    Procedure: COLONOSCOPY, Surgeon: Camilo Cason MD;  Location: [facility]    Eye surgery      Hernia surgery  2018    ventral & umbilical hernia repair    Lumpectomy left Left 2014    Invasive ductal CA    Isra localization wire 1 site right (cpt=19281) Right 2014    Fibroadenoma    Needle biopsy left Left 2014    US guided biopsy - [facility]    Other  2020    dilation and curettage     Other surgical history  age 11    right arm surgery    Other surgical history  age 2    dental work under anesthesia    Radiation left Left 2014    lump and radiation    Tubal ligation  10/19/1996    [place], [place]      Family History   Problem Relation Age of Onset    Other (Suspected Bipolar) Mother     Glaucoma Mother     Alcohol and Other Disorders Associated Mother     Diabetes Maternal Grandmother     Breast Cancer Maternal Grandmother 65        estimated age    Cancer Paternal Grandmother         breast    Breast Cancer Paternal Grandmother 65    Breast Cancer Self 43    Anxiety Daughter     Other (borderline) Daughter     Bipolar Disorder Daughter     Other (IED) Son     Other (violent) Son     Depression Son       Social History:   Social History     Socioeconomic History    Marital status:     Number of children: 2   Occupational History    Occupation:  for [employer]     Employer: [employer]    Tobacco Use    Smoking status: Former     Current packs/day: 0.00     Average packs/day: 1 pack/day for 15.0 years (15.0 ttl pk-yrs)     Types: Cigarettes     Start date: 3/1/1988     Quit date: 3/1/2003     Years since quittin.3     Passive exposure: Never    Smokeless tobacco: Never   Vaping Use    Vaping  status: Never Used   Substance and Sexual Activity    Alcohol use: Yes     Alcohol/week: 0.0 standard drinks of alcohol     Comment: rarely    Drug use: No    Sexual activity: Not Currently   Other Topics Concern    Caffeine Concern Yes     Comment: 1 large sweet tea daily    Exercise No    Seat Belt Yes     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 Marital Status: . Children: 2.   Exercise:  none currently due to ankle fracture .    Diet:  tries to watch her diet.     REVIEW OF SYSTEMS:   GENERAL: Overall feels well  SKIN: denies any unusual skin lesions or rashes  EYES: denies vision changes  HEENT: denies upper respiratory symptoms  LUNGS: denies cough or shortness of breath with exertion  CHEST:  denies breast changes or pain  CARDIOVASCULAR: denies chest pain or tightness on exertion  VASCULAR: No lower extremity swelling  GI: denies abdominal pain, bowel movement changes, blood in stool  : No complaint of urinary problems, vaginal discharge or discomfort  MUSCULOSKELETAL: denies joint pain   NEURO: denies headaches or dizziness  PSYCH: denies depression or anxiety  ENDOCRINE: No complaints of temperature intolerance, polyuria, or excessive sweating.  LYMPHATICS: No complaints of swollen glands      EXAM:   /62   Pulse 97   Temp 97.7 °F (36.5 °C) (Temporal)   Resp 16   Ht 5' 2\" (1.575 m)   Wt 168 lb (76.2 kg)   LMP 10/25/2021 (Approximate)   SpO2 97%   BMI 30.73 kg/m²   Body mass index is 30.73 kg/m².   GENERAL: NAD, Pleasant overweight  female.  SKIN: No visible rashes or suspicious lesions.  HEENT: atraumatic, normocephalic, EACs and TMs clear normal bilaterally.  Nose: No nasal discharge.  OP: MMM.  Posteriorly no exudate or erythema.  EYES: PERRL, EOMI, sclera, conjunctiva are clear  NECK: supple, no adenopathy/thyromegaly/masses  LUNGS: Clear to auscultation bilateral, no rales, rhonchi or wheezing  CARDIO: RRR without murmur normal S1S2  ABD: Obese.  Soft, non tender to palpation.  No masses, HSM, or pulsations appreciated.  MUSCULOSKELETAL: Patient with boot on her left foot/ankle.  Using a scooter to aid with mobility.  EXTREMITIES: no clubbing, cyanosis, or edema  NEURO: Alert and oriented x3.    PSYCH: normal affect      Immunization History   Administered Date(s) Administered    Covid-19 Vaccine Pfizer 30 mcg/0.3 ml 06/07/2021, 06/28/2021    FLULAVAL 6 months & older 0.5 ml Prefilled syringe (39240) 10/21/2020, 11/03/2022    FLUZONE 6 months and older PFS 0.5 ml (74671) 01/22/2024    HEP B, Adult 06/29/2023, 07/31/2023, 01/22/2024    Hep A, Adult 06/29/2023, 01/22/2024    TDAP 07/15/2019    Tb Intradermal Test 03/06/2017   Deferred Date(s) Deferred    Influenza Vaccine Refused 09/21/2020       DATA:    Lab Results   Component Value Date    CHOLEST 188 01/18/2024    CHOLEST 187 05/16/2022    CHOLEST 167 12/08/2020     Lab Results   Component Value Date    HDL 40 01/18/2024    HDL 48 (L) 05/16/2022    HDL 45 12/08/2020     Lab Results   Component Value Date     (H) 01/18/2024     (H) 05/16/2022     (H) 12/08/2020     Lab Results   Component Value Date    TRIG 153 (H) 01/18/2024    TRIG 88 05/16/2022    TRIG 111 12/08/2020     Lab Results   Component Value Date    AST 43 (H) 01/18/2024    AST 30 02/22/2023    AST 31 11/01/2022     Lab Results   Component Value Date    ALT 76 (H) 01/18/2024    ALT 54 02/22/2023    ALT 59 (H) 11/01/2022             ASSESSMENT AND PLAN:   Aury Wallis is a 53 year old female who presents for a complete physical exam.        Encounter Diagnoses   Name Primary?    Routine general medical examination at a health care facility Yes    Class 1 obesity due to excess calories with serious comorbidity and body mass index (BMI) of 30.0 to 30.9 in adult        -Try calling [facility] at [phone] to see if they have Zepbound 5 mg dose in stock, and if so then let your weight loss clinic provider know so they  can order it and send the prescription to them.      1. Routine general medical examination at a health care facility  1. Patient provided handout on women's health and prevention.   Age-appropriate calcium and vitamin D intake discussed.    2. Class 1 obesity due to excess calories with serious comorbidity and body mass index (BMI) of 30.0 to 30.9 in adult  Recommend healthy diet including green leafy vegetables, fresh fruits and lean protein.  Aerobic exercise for total of 150 minutes/week is recommended for cardiovascular fitness, and 45-60 minutes 6-7 days a week to help obtain weight loss.   -Try calling [employer] at [phone] to see if they have Zepbound 5 mg dose in stock, and if so then let your weight loss clinic provider know so they can order it and send the prescription to them.      Return in about 1 year (around 6/17/2025) for Annual wellness visit.  Sooner if needed..

## 2024-06-18 NOTE — PATIENT INSTRUCTIONS
-Try calling [facility] at [phone] to see if they have Zepbound 5 mg dose in stock, and if so then let your weight loss clinic provider know so they can order it and send the prescription to them.

## 2024-06-19 ENCOUNTER — TELEPHONE (OUTPATIENT)
Dept: PHYSICAL THERAPY | Facility: HOSPITAL | Age: 54
End: 2024-06-19

## 2024-07-02 ENCOUNTER — TELEPHONE (OUTPATIENT)
Dept: PHYSICAL THERAPY | Facility: HOSPITAL | Age: 54
End: 2024-07-02

## 2024-07-11 ENCOUNTER — TELEPHONE (OUTPATIENT)
Dept: PHYSICAL THERAPY | Facility: HOSPITAL | Age: 54
End: 2024-07-11

## 2024-07-15 ENCOUNTER — OFFICE VISIT (OUTPATIENT)
Dept: PHYSICAL THERAPY | Age: 54
End: 2024-07-15
Attending: PHYSICIAN ASSISTANT
Payer: COMMERCIAL

## 2024-07-15 DIAGNOSIS — M25.572 ACUTE LEFT ANKLE PAIN: ICD-10-CM

## 2024-07-15 DIAGNOSIS — R26.2 DIFFICULTY WALKING: ICD-10-CM

## 2024-07-15 DIAGNOSIS — S82.845D CLOSED NONDISPLACED BIMALLEOLAR FRACTURE OF LEFT ANKLE WITH ROUTINE HEALING, SUBSEQUENT ENCOUNTER: Primary | ICD-10-CM

## 2024-07-15 PROCEDURE — 97161 PT EVAL LOW COMPLEX 20 MIN: CPT

## 2024-07-15 PROCEDURE — 97110 THERAPEUTIC EXERCISES: CPT

## 2024-07-15 NOTE — PROGRESS NOTES
LOWER EXTREMITY EVALUATION:     Diagnosis:   Closed nondisplaced bimalleolar fracture of left ankle with routine healing, subsequent encounter (S82.845D), acute left ankle pain (M25.572), difficulty walking (R26.2)  Referring Provider: UNA Pretty Date of Evaluation:    7/15/2024    Precautions:  None Next MD visit:   8-7-24  Date of Surgery: n/a  Date of injury: 4-21-24 s/p fall down stairs     PATIENT SUMMARY   Aury Wallis is a 53 year old female who presents to therapy today with complaints of L ankle pain s/p bimalleolar fracture 12 wks ago. She was initially casted, and then put in boot 6-12-24, instructed to wean from boot starting ~7-1-24, which she has done. She states she still wears the boot for walking longer periods, which she does 2-3x/week. \"It basically feels like I\"m walking on a sprained ankle.\" Takes ibuprofen daily PRN. Reports constant N/T between 1st-2nd digits.    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   Current functional limitations include cautious c stairs, not sleeping on 3rd level to avoid stairs during night from bedroom to bathroom, intermittent wearing of boot c walking.     Aury describes prior level of function gardening, being able to walk freely without pain or limitation, able to sleep in her bedroom on 3rd level. Pt goals include return to walking for exercise 45min bouts, easier stairs, resolve pain.  Past medical history was reviewed with Aury. Significant findings include major depressive disorder, recurrent episode, moderate anxious distress; hypercholesterolemia, mixed hyperlipidemia, obesity, CKD, hx of breast CA, bilateral hearing aid, circadian rhythm sleep disorder shift work    ASSESSMENT  Aury presents to physical therapy evaluation with primary c/o L ankle pain s/p closed bimalleolar fracture. The results of the objective tests and measures show decr L ankle AROM and strength, unable to  perform single-leg heel raise, impaired gait and balance.  Functional deficits include but are not limited to decr walking tolerance, difficulty and caution c stairs, continued intermittent use of CAM boot for pain.  Signs and symptoms are consistent with rehab diagnosis. Pt and PT discussed evaluation findings, pathology, POC and HEP.  Pt voiced understanding and performs HEP correctly without reported pain. Skilled Physical Therapy is medically necessary to address the above impairments and reach functional goals.     OBJECTIVE:   12 wks s/p injury 24  Observation: mild redness L foot/ankle vs R  Palpation: minimal TTP distal fibula  Sensation: intact to light touch but diminished interspace 1st-2nd digits on L vs R    AROM: (* denotes performed with pain)  @eval 7-15-24   Ankle DF: R 5; L 0  Ankle PF: R 70; L 50  Ankle INV: R 33; L 28  Ankle EV: R 25; L 15*lat foot     Flexibility:  Gastroc-soleus: R WNL; L decr    Strength/MMT: (* denotes performed with pain)  @eval 7-15-24   Ankle DF: R 5/5; L 5/5  Ankle PF: R 5/5; L 4+/5  Ankle INV: R 5/5; L 4+*/5  Ankle EV: R 5/5; L 4+/5     Special tests:   Xmalleolar girth: R 21.9cm / L 23.8cm  B heel raise: x5 c = excursion and moderate pain L lat ankle and forefoot  SL heel raise: R x10 s issue / L unable*    Gait: pt ambulates on level ground with  decr stance time LLE, decr pushoff LLE, no A.D., decr speed  Stairs: ascend reciprocally c min A 1 railing to assist LLE pain L anterior ankle ; descends reciprocal c min-mod A 1 railing c poor eccentric quad control LLE, pain L ankle    Balance:   Tandem: R-retro >20 sec / L-retro >20sec  SLS: R 10 sec, L 1* sec    Imagin24: XR ANKLE (MIN 3 VIEWS), LEFT   FINDINGS:  There is redemonstration of nondisplaced medial and lateral malleolar fractures.  There is persistent lucency along the lateral malleolar fracture, which demonstrates mild interval sclerosis at the fracture margin.  The medial malleolar fracture line  is less conspicuous on the current exam.  Continued follow-up is recommended.  Otherwise no significant change.     Today’s Treatment and Response:   Pt education was provided on exam findings, treatment diagnosis, treatment plan, expectations, and prognosis. Pt was also provided recommendations for importance of remaining active and shoe wear.  Patient was instructed in and issued a HEP for:   Access Code: [email or web address] ; URL: [email or web address]/ ; Prepared by: Marilou Guzman  Date: 07/15/2024  Exercises  - Long Sitting Calf Stretch with Strap  - 2 x daily - 5 reps - 10 hold  - Seated Ankle Alphabet  - 2 x daily - 1 reps  - Seated Ankle Inversion with Resistance  - 2 x daily - 20 reps  - Seated Ankle Eversion with Resistance  - 2 x daily - 20 reps  - Seated Arch Lifts  - 2 x daily - 2 sets - 10 reps - 5 hold  - Tandem Walking with Counter Support  - 2 x daily - 2 reps - 30 seconds    Charges: PT Eval Low Complexity, therex-1      Total Timed Treatment: 15 min     Total Treatment Time: 40 min     Based on clinical rationale and outcome measures, this evaluation involved Low Complexity decision making.   PLAN OF CARE:    Goals: (to be met in 8 visits)  Consistently decr pain < or = 1/10 intermittent for incr QOL and activity tolerance  Overall incr in function as indicated by incr LEFS at least 10 pts  Painfree full AROM L ankle comparable c uninvolved side within 5 degrees to promote improved gait and stairs  Painfree full MMT L ankle and incr functional strength for SL heel raise at least 10 reps minimal to no pain to promote incr walking tolerance  Pt able to ascend/descend stairs reciprocally c good form and control, painfree to allow pt to safetly go up to bedroom to sleep  Pt able to ambulate > or = 20 min s boot c minimal to no pain to progress walking tolerance for exercise  Pt able to SLS on L for at least 10 sec c minimal to no pain to decr fall risk  Indep HEP to promote cont progress toward  functional goals    Frequency / Duration: Patient will be seen for 1-2 x/week or a total of 8 visits over a 90 day period. Treatment will include: Gait training, Manual Therapy, Neuromuscular Re-education, Therapeutic Exercise, and Home Exercise Program instruction    Education or treatment limitation: None  Rehab Potential:good    LEFS Score  LEFS Score: 61.25 % (7/15/2024  6:38 PM)      Patient/Family/Caregiver was advised of these findings, precautions, and treatment options and has agreed to actively participate in planning and for this course of care.    Thank you for your referral. Please co-sign or sign and return this letter via fax as soon as possible to [phone]. If you have any questions, please contact me at Dept: [phone]    Sincerely,  Electronically signed by therapist: Marilou Guzman PT  Physician's certification required: Yes  I certify the need for these services furnished under this plan of treatment and while under my care.    X___________________________________________________ Date____________________    Certification From: 7/15/2024  To:10/13/2024

## 2024-07-19 NOTE — PROGRESS NOTES
PT DAILY NOTE  Diagnosis:   Closed nondisplaced bimalleolar fracture of left ankle with routine healing, subsequent encounter (S82.845D), acute left ankle pain (M25.572), difficulty walking (R26.2)  Referring Provider: UNA Pretty Date of Evaluation:    7/15/2024    Precautions:  None Next MD visit:   8-7-24  Date of Surgery: n/a  Date of injury: 4-21-24 s/p fall down stairs     Insurance Primary/Secondary: CIGNA/CIGNA ENDEAVOR     Visit 2 of 8   Date POC Expires: 10-13-24       Subjective: Pt states she has been doing her ex's. She is still unable to elevate onto toes on LLE without UE support.  Pain:      3/10 when she walks   @eval: Aury Wallis is a 53 year old female who presents to therapy today with complaints of L ankle pain s/p bimalleolar fracture 12 wks ago. She was initially casted, and then put in boot 6-12-24, instructed to wean from boot starting ~7-1-24, which she has done. She states she still wears the boot for walking longer periods, which she does 2-3x/week. \"It basically feels like I\"m walking on a sprained ankle.\" Takes ibuprofen daily PRN. Reports constant N/T between 1st-2nd digits.  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   Current functional limitations include cautious c stairs, not sleeping on 3rd level to avoid stairs during night from bedroom to bathroom, intermittent wearing of boot c walking.   Aury describes prior level of function gardening, being able to walk freely without pain or limitation, able to sleep in her bedroom on 3rd level. Pt goals include return to walking for exercise 45min bouts, easier stairs, resolve pain.  Past medical history was reviewed with Aury. Significant findings include major depressive disorder, recurrent episode, moderate anxious distress; hypercholesterolemia, mixed hyperlipidemia, obesity, CKD, hx of breast CA, bilateral hearing aid, circadian rhythm sleep disorder shift  work    Objective:   Pt demo decr pushoff LLE disrupting fluidity of gait  B HR 1/10 pain at top of range, but otherwise painfree, exercised in painfree range  Attempts at L SLS, or c assist of R toe down both painful, so not done    Imagin24: XR ANKLE (MIN 3 VIEWS), LEFT   FINDINGS:  There is redemonstration of nondisplaced medial and lateral malleolar fractures.  There is persistent lucency along the lateral malleolar fracture, which demonstrates mild interval sclerosis at the fracture margin.  The medial malleolar fracture line is less conspicuous on the current exam.  Continued follow-up is recommended.  Otherwise no significant change.     Treatment:  (\"NP\" indicates Not Performed this date)  Manual Therapy:  AP L TC jt gr 2-3  PROM L ankle    Neuromuscular Re-education:    Therapeutic Exercise: [place] 7-15-24   7-22-24    Recumbent bike for CKC strength and ankle ROM   5min   Long sit L calf stretch c strap 10\"x3     Standing L gastroc stretch  20\"x3   L ankle alphabet x1  x1   L ankle inversion vs TB Rx10  R2x10   L ankle eversion vs TB Rx10 R2x10    Seated CKC arch lifts 5\"x5  5\"x10   Tandem walking 30\"  1'x2   Tandem balance  1'ea     B HR painfree range    1x10    1/2 FR balance    2'   Stagger c 1LE on 6\" step in front c alt UE TB pull  x20ea   Alt fwd tap 6\"  1'             HEP: Access Code: [email or web address] ; URL: [email or web address] ; Prepared by: Marilou Guzman  Date: 07/15/2024  Exercises  - Long Sitting Calf Stretch with Strap  - 2 x daily - 5 reps - 10 hold  - Seated Ankle Alphabet  - 2 x daily - 1 reps  - Seated Ankle Inversion with Resistance  - 2 x daily - 20 reps  - Seated Ankle Eversion with Resistance  - 2 x daily - 20 reps  - Seated Arch Lifts  - 2 x daily - 2 sets - 10 reps - 5 hold  - Tandem Walking with Counter Support  - 2 x daily - 2 reps - 30 seconds  -: B HR in painfree range    Assessment/Plan: Pt tolerated several new ex's today to promote increased L ankle  mobility and strength, in order to progress quality of gait and balance. She only reported pain at end of range c standing B HR, but was able to complete in a painfree range. Adding to HEP. Pt did note pain c attempt at L SLS, even if allowed PWB through RLE, so held this exercise for now. Continue to address deficits to work toward PLOF and set goals.    PLAN OF CARE:    Goals: (to be met in 8 visits)  Consistently decr pain < or = 1/10 intermittent for incr QOL and activity tolerance  Overall incr in function as indicated by incr LEFS at least 10 pts  Painfree full AROM L ankle comparable c uninvolved side within 5 degrees to promote improved gait and stairs  Painfree full MMT L ankle and incr functional strength for SL heel raise at least 10 reps minimal to no pain to promote incr walking tolerance  Pt able to ascend/descend stairs reciprocally c good form and control, painfree to allow pt to safetly go up to bedroom to sleep  Pt able to ambulate > or = 20 min s boot c minimal to no pain to progress walking tolerance for exercise  Pt able to SLS on L for at least 10 sec c minimal to no pain to decr fall risk  Indep HEP to promote cont progress toward functional goals    Frequency / Duration: Patient will be seen for 1-2 x/week or a total of 8 visits over a 90 day period.     All Treatments performed at distinct and separate times during the therapy session.  Treatment Minutes  Units charged   Manual Therapy 15 minutes 1   Therapeutic Activity 0 minutes    Neuromuscular Re-education 0 minutes    Therapeutic Exercise 30 minutes 2   Total Direct Treatment Time 45 minutes

## 2024-07-22 ENCOUNTER — OFFICE VISIT (OUTPATIENT)
Dept: PHYSICAL THERAPY | Age: 54
End: 2024-07-22
Attending: PHYSICIAN ASSISTANT
Payer: COMMERCIAL

## 2024-07-22 DIAGNOSIS — M25.572 ACUTE LEFT ANKLE PAIN: ICD-10-CM

## 2024-07-22 DIAGNOSIS — R26.2 DIFFICULTY WALKING: ICD-10-CM

## 2024-07-22 DIAGNOSIS — S82.845D CLOSED NONDISPLACED BIMALLEOLAR FRACTURE OF LEFT ANKLE WITH ROUTINE HEALING, SUBSEQUENT ENCOUNTER: Primary | ICD-10-CM

## 2024-07-22 PROCEDURE — 97140 MANUAL THERAPY 1/> REGIONS: CPT

## 2024-07-22 PROCEDURE — 97110 THERAPEUTIC EXERCISES: CPT

## 2024-07-24 ENCOUNTER — OFFICE VISIT (OUTPATIENT)
Dept: PHYSICAL THERAPY | Age: 54
End: 2024-07-24
Attending: PHYSICIAN ASSISTANT
Payer: COMMERCIAL

## 2024-07-24 DIAGNOSIS — S82.845D CLOSED NONDISPLACED BIMALLEOLAR FRACTURE OF LEFT ANKLE WITH ROUTINE HEALING, SUBSEQUENT ENCOUNTER: Primary | ICD-10-CM

## 2024-07-24 DIAGNOSIS — R26.2 DIFFICULTY WALKING: ICD-10-CM

## 2024-07-24 DIAGNOSIS — M25.572 ACUTE LEFT ANKLE PAIN: ICD-10-CM

## 2024-07-24 PROCEDURE — 97110 THERAPEUTIC EXERCISES: CPT

## 2024-07-24 PROCEDURE — 97140 MANUAL THERAPY 1/> REGIONS: CPT

## 2024-07-24 NOTE — PROGRESS NOTES
PT DAILY NOTE  Diagnosis:   Closed nondisplaced bimalleolar fracture of left ankle with routine healing, subsequent encounter (S82.845D), acute left ankle pain (M25.572), difficulty walking (R26.2)  Referring Provider: UNA Pretty Date of Evaluation:    7/15/2024    Precautions:  None Next MD visit:   8-7-24  Date of Surgery: n/a  Date of injury: 4-21-24 s/p fall down stairs     Insurance Primary/Secondary: CIGNA/CIGNA ENDEAVOR     Visit 3 of 8   Date POC Expires: 10-13-24       Subjective: Pt states she kept her boot on today because she was walking a lot running errands. So now it is a little more swollen than she would like. 'it's easier to walk in the boot.'  Pain:      1/10 when she walks   @eval: Aury Wallis is a 53 year old female who presents to therapy today with complaints of L ankle pain s/p bimalleolar fracture 12 wks ago. She was initially casted, and then put in boot 6-12-24, instructed to wean from boot starting ~7-1-24, which she has done. She states she still wears the boot for walking longer periods, which she does 2-3x/week. \"It basically feels like I\"m walking on a sprained ankle.\" Takes ibuprofen daily PRN. Reports constant N/T between 1st-2nd digits.  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   Current functional limitations include cautious c stairs, not sleeping on 3rd level to avoid stairs during night from bedroom to bathroom, intermittent wearing of boot c walking.   Aury describes prior level of function gardening, being able to walk freely without pain or limitation, able to sleep in her bedroom on 3rd level. Pt goals include return to walking for exercise 45min bouts, easier stairs, resolve pain.  Past medical history was reviewed with Aury. Significant findings include major depressive disorder, recurrent episode, moderate anxious distress; hypercholesterolemia, mixed hyperlipidemia, obesity, CKD, hx of breast CA,  bilateral hearing aid, circadian rhythm sleep disorder shift work    Objective:   Pain across MTPs reported c fwd step-down onto RLE c L foot on 4\" step in DF+MTP extension  Added seated AROM heel raises and self-stretch MTP extension which helped when she tried step-down again afterward  Passive DF past neutral , but pt does feel anterior TC stretch c knee taps at wall to mobilize this joint    Imagin24: XR ANKLE (MIN 3 VIEWS), LEFT   FINDINGS:  There is redemonstration of nondisplaced medial and lateral malleolar fractures.  There is persistent lucency along the lateral malleolar fracture, which demonstrates mild interval sclerosis at the fracture margin.  The medial malleolar fracture line is less conspicuous on the current exam.  Continued follow-up is recommended.  Otherwise no significant change.     Treatment:  (\"NP\" indicates Not Performed this date)  Manual Therapy:  AP L TC jt gr 2-3  PROM L ankle    Neuromuscular Re-education:    Therapeutic Exercise: Eval 7-15-24   7-22-24    7-24-24   Recumbent bike for CKC strength and ankle ROM   5min 5min   Long sit L calf stretch c strap 10\"x3      Standing L gastroc stretch  20\"x3 20\"x3   L ankle alphabet x1  x1    L ankle inversion vs TB Rx10  R2x10 R2x10   L ankle eversion vs TB Rx10 R2x10  R2x10   Seated CKC arch lifts 5\"x5  5\"x10 5\"x20   Tandem walking 30\"  1'x2    Tandem balance  1'ea  1'ea    B HR painfree range    1x10 1x10    1/2 FR balance    2' 2'   Stagger c 1LE on 6\" step in front c alt UE TB pull  x20ea 1'ea   Alt fwd tap 6\"  1' 1'   March on airex   1'   L knee taps to wall for ankle DF   2x10   4\" lateral dips   L2x10   Seated B HR for MTP extension   x20   Seated self-stretch MTP extens   10\"x3              HEP: Access Code: [email or web address] ; URL: [email or web address] ; Prepared by: Marilou Guzman  upDate: 2024  - Seated Ankle Alphabet  - 2 x daily - 1 reps  - Seated Ankle Inversion with Resistance  - 2 x daily - 20  reps  - Seated Ankle Eversion with Resistance  - 2 x daily - 20 reps  - Seated Arch Lifts  - 2 x daily - 2 sets - 10 reps - 5 hold  - Tandem Walking with Counter Support  - 2 x daily - 2 reps - 30 seconds  - Seated Heel Raise  - 1 x daily - 2 sets - 10 reps  - Heel Raises with Counter Support  - 1 x daily - 1-2 sets - 10 reps  - Gastroc Stretch on Wall  - 2 x daily - 3 reps - 20 hold  - Seated Plantar Fascia Stretch  - 2 x daily - 5 reps - 10 hold    Assessment/Plan: Pt reports discomfort across mid-foot/forefoot when standing on LLE stepping forward off a 4\" step. This pain diminished but did not resolve after performing some AROM/self-stretches into MTP extension. Added these stretches to HEP today. She also tolerated talocrural stretch c standing knee taps/flexion to wall. Continue to address deficits to progress L foot/ankle mobility and pushoff strength for gait. Encouraged pt to abstain from using boot at this point, even if going for a longer walk, to encourage incr strength.    PLAN OF CARE:    Goals: (to be met in 8 visits)  Consistently decr pain < or = 1/10 intermittent for incr QOL and activity tolerance  Overall incr in function as indicated by incr LEFS at least 10 pts  Painfree full AROM L ankle comparable c uninvolved side within 5 degrees to promote improved gait and stairs  Painfree full MMT L ankle and incr functional strength for SL heel raise at least 10 reps minimal to no pain to promote incr walking tolerance  Pt able to ascend/descend stairs reciprocally c good form and control, painfree to allow pt to safetly go up to bedroom to sleep  Pt able to ambulate > or = 20 min s boot c minimal to no pain to progress walking tolerance for exercise  Pt able to SLS on L for at least 10 sec c minimal to no pain to decr fall risk  Indep HEP to promote cont progress toward functional goals    Frequency / Duration: Patient will be seen for 1-2 x/week or a total of 8 visits over a 90 day period.     All  Treatments performed at distinct and separate times during the therapy session.  Treatment Minutes  Units charged   Manual Therapy 8 minutes 1   Therapeutic Activity 0 minutes    Neuromuscular Re-education 0 minutes    Therapeutic Exercise 40 minutes 3   Total Direct Treatment Time 48 minutes

## 2024-07-26 ENCOUNTER — OFFICE VISIT (OUTPATIENT)
Facility: CLINIC | Age: 54
End: 2024-07-26
Payer: COMMERCIAL

## 2024-07-26 VITALS
HEART RATE: 91 BPM | OXYGEN SATURATION: 96 % | DIASTOLIC BLOOD PRESSURE: 76 MMHG | BODY MASS INDEX: 29.44 KG/M2 | WEIGHT: 160 LBS | SYSTOLIC BLOOD PRESSURE: 116 MMHG | RESPIRATION RATE: 18 BRPM | HEIGHT: 62 IN

## 2024-07-26 DIAGNOSIS — Z51.81 ENCOUNTER FOR THERAPEUTIC DRUG LEVEL MONITORING: Primary | ICD-10-CM

## 2024-07-26 DIAGNOSIS — E78.00 HYPERCHOLESTEROLEMIA: ICD-10-CM

## 2024-07-26 DIAGNOSIS — E66.3 OVERWEIGHT (BMI 25.0-29.9): ICD-10-CM

## 2024-07-26 DIAGNOSIS — K75.81 NASH (NONALCOHOLIC STEATOHEPATITIS): ICD-10-CM

## 2024-07-26 DIAGNOSIS — R73.01 IFG (IMPAIRED FASTING GLUCOSE): ICD-10-CM

## 2024-07-26 DIAGNOSIS — F41.9 ANXIETY AND DEPRESSION: ICD-10-CM

## 2024-07-26 DIAGNOSIS — F32.A ANXIETY AND DEPRESSION: ICD-10-CM

## 2024-07-26 RX ORDER — TIRZEPATIDE 7.5 MG/.5ML
7.5 INJECTION, SOLUTION SUBCUTANEOUS WEEKLY
COMMUNITY
Start: 2024-06-23

## 2024-07-26 RX ORDER — TIRZEPATIDE 10 MG/.5ML
10 INJECTION, SOLUTION SUBCUTANEOUS WEEKLY
Qty: 2 ML | Refills: 0 | Status: SHIPPED | OUTPATIENT
Start: 2024-07-26 | End: 2024-08-17

## 2024-07-26 NOTE — PROGRESS NOTES
HISTORY OF PRESENT ILLNESS  Chief Complaint   Patient presents with    Weight Check     -15lb       Aury Wallis is a 53 year old female here for follow up in medical weight loss program.   -15lbs  Compliant on zepbound  Denies chest pain, shortness of breath, dizziness, blurred vision, headache, paresthesia, nausea/vomiting.   Broke her ankle in April, is starting physical therapy next week  Trying to get more protein, lunch is smaller, after dinner snack is typically more carb    Exercise/Activity: limited due to ankle fracture and was non weightbearing  Nutrition: 24 hour food log reviewed, eating regular meals, +protein  Stress: up and down with ankle fracture  Sleep: going to bed too late       Wt Readings from Last 6 Encounters:   07/26/24 160 lb (72.6 kg)   06/17/24 168 lb (76.2 kg)   04/24/24 170 lb (77.1 kg)   04/21/24 170 lb (77.1 kg)   02/05/24 175 lb (79.4 kg)   01/22/24 176 lb 3.2 oz (79.9 kg)            Breakfast Lunch Dinner Snacks Fluids   Reviewed           REVIEW OF SYSTEMS  GENERAL HEALTH: feels well otherwise, denied any fevers chills or night sweats   RESPIRATORY: denies shortness of breath   CARDIOVASCULAR: denies chest pain  GI: denies abdominal pain    EXAM  /76   Pulse 91   Resp 18   Ht 5' 2\" (1.575 m)   Wt 160 lb (72.6 kg)   LMP 10/25/2021 (Approximate)   SpO2 96%   BMI 29.26 kg/m²   GENERAL: well developed, well nourished,in no apparent distress, A/O x3  SKIN: no rashes,no suspicious lesions  HEENT: atraumatic, normocephalic, OP-clear, PERRL  NECK: supple,no adenopathy  LUNGS: clear to auscultation bilaterally   CARDIO: RRR without murmur  GI: good BS's,NT/ND, no masses or HSM  EXTREMITIES: no cyanosis, no clubbing, no edema    Lab Results   Component Value Date    WBC 5.9 01/18/2024    RBC 4.35 01/18/2024    HGB 13.6 01/18/2024    HCT 39.7 01/18/2024    MCV 91.3 01/18/2024    MCH 31.3 01/18/2024    MCHC 34.3 01/18/2024    RDW 12.5 01/18/2024    .0 01/18/2024     Lab  Results   Component Value Date    GLU 88 01/18/2024    BUN 15 01/18/2024    BUNCREA NOT APPLICABLE 05/16/2022    CREATSERUM 0.96 01/18/2024    ANIONGAP 6 01/18/2024    GFR 87 09/08/2017    GFRNAA 83 05/16/2022    GFRAA 96 05/16/2022    CA 9.6 01/18/2024    OSMOCALC 294 01/18/2024    ALKPHO 80 01/18/2024    AST 43 (H) 01/18/2024    ALT 76 (H) 01/18/2024    BILT 0.6 01/18/2024    TP 7.8 01/18/2024    ALB 4.1 01/18/2024    GLOBULIN 3.7 01/18/2024    AGRATIO 1.8 05/16/2022     01/18/2024    K 4.0 01/18/2024     01/18/2024    CO2 27.0 01/18/2024     No results found for: \"EAG\", \"A1C\"  Lab Results   Component Value Date    CHOLEST 188 01/18/2024    TRIG 153 (H) 01/18/2024    HDL 40 01/18/2024     (H) 01/18/2024    VLDL 27 01/18/2024    TCHDLRATIO 3.9 05/16/2022    NONHDLC 148 (H) 01/18/2024     Lab Results   Component Value Date    T4F 1.0 01/18/2024    TSH 2.140 01/18/2024     Lab Results   Component Value Date    B12 518 12/08/2020     Lab Results   Component Value Date    VITD 48.5 12/08/2020       Current Outpatient Medications on File Prior to Visit   Medication Sig Dispense Refill    ZEPBOUND 7.5 MG/0.5ML Subcutaneous Solution Auto-injector Inject 7.5 mg into the skin once a week.      escitalopram 10 MG Oral Tab Take 1 tablet (10 mg total) by mouth daily. 90 tablet 0    buPROPion  MG Oral Tablet 24 Hr Take 1 tablet (150 mg total) by mouth every morning. 90 tablet 0    ALPRAZolam 0.5 MG Oral Tab Take 0.5-1 tablets (0.25-0.5 mg total) by mouth daily as needed for Anxiety. 30 tablet 1    Calcium-Magnesium-Vitamin D (CALCIUM MAGNESIUM OR) Take by mouth. Liquid      Ascorbic Acid (VITAMIN C) 100 MG Oral Tab Take 1 tablet (100 mg total) by mouth daily.      cholecalciferol 125 MCG (5000 UT) Oral Tab Take 1 tablet (5,000 Units total) by mouth. 2 tab daily Mon-Friday to =10,000      Nutritional Supplements (ANTIOXIDANTS OR) Take 1 tablet by mouth daily. Take as directed       No current  facility-administered medications on file prior to visit.       ASSESSMENT  Analyzed weight data:       Diagnoses and all orders for this visit:    Encounter for therapeutic drug level monitoring  -     Tirzepatide-Weight Management (ZEPBOUND) 10 MG/0.5ML Subcutaneous Solution Auto-injector; Inject 10 mg into the skin once a week for 4 doses.    Overweight (BMI 25.0-29.9)  -     Tirzepatide-Weight Management (ZEPBOUND) 10 MG/0.5ML Subcutaneous Solution Auto-injector; Inject 10 mg into the skin once a week for 4 doses.    IFG (impaired fasting glucose)  -     Tirzepatide-Weight Management (ZEPBOUND) 10 MG/0.5ML Subcutaneous Solution Auto-injector; Inject 10 mg into the skin once a week for 4 doses.    SOTO (nonalcoholic steatohepatitis)  -     Tirzepatide-Weight Management (ZEPBOUND) 10 MG/0.5ML Subcutaneous Solution Auto-injector; Inject 10 mg into the skin once a week for 4 doses.    Hypercholesterolemia  -     Tirzepatide-Weight Management (ZEPBOUND) 10 MG/0.5ML Subcutaneous Solution Auto-injector; Inject 10 mg into the skin once a week for 4 doses.    Anxiety and depression        PLAN  Initial Weight: 182lbs  Initial Weight Date: 4/26/23  Today's Weight: 160lbs  5% Goal: 9.1lbs  10% Goal: 18.2lbs  Total Weight Loss: Down 16lb/Net loss 22lbs    Will continue and increase zepbound - advised side effects and adverse effects of medication   IFG - continue current medications, low carb diet  HLD - lifestyle changes  SOTO - low carb, low fat diet  Mood is stable on current medications, continue to work on stress management  Consistency with logging foods - protein and produce  Increase exercise as tolerated and released by ortho  Nutrition: low carb diet/ recommended to eat breakfast daily/ regular protein intake  Medication use and side effects reviewed with patient.  Medication contraindications: caution stimulant - anxiety  Follow up with dietitian and psychologist as recommended.  Discussed the role of sleep and  stress in weight management.  Counseled on comprehensive weight loss plan including attention to nutrition, exercise and behavior/stress management for success. See patient instruction below for more details.  Discussed strategies to overcome barriers to successful weight loss and weight maintenance  FITTE: ACSM recommendations (150-300 minutes/ week in active weight loss)   Weight Loss consent to treat reviewed and signed       NOTE TO PATIENT: The 21st Century Cures Act makes clinical notes like these available to patients in the interest of transparency. Clinical notes are medical documents used by physicians and care providers to communicate with each other. These documents include medical language and terminology, abbreviations, and treatment information that may sound technical and at times possibly unfamiliar. In addition, at times, the verbiage may appear blunt or direct. These documents are one tool providers use to communicate relevant information and clinical opinions of the care providers in a way that allows common understanding of the clinical context.     There are no Patient Instructions on file for this visit.    No follow-ups on file.    Patient verbalizes understanding.    Noemí Moreno PA-C  7/26/2024

## 2024-07-26 NOTE — PROGRESS NOTES
PT DAILY NOTE  Diagnosis:   Closed nondisplaced bimalleolar fracture of left ankle with routine healing, subsequent encounter (S82.845D), acute left ankle pain (M25.572), difficulty walking (R26.2)  Referring Provider: UNA Pretty Date of Evaluation:    7/15/2024    Precautions:  None Next MD visit:   8-7-24  Date of Surgery: n/a  Date of injury: 4-21-24 s/p fall down stairs     Insurance Primary/Secondary: CIGNA/CIGNA ENDEAVOR     Visit 4 of 8   Date POC Expires: 10-13-24       Subjective: Pt states she overdid it 2 days ago doing yardwork (bending, stepping on shovel, kneeling, up/down repeatedly), for about 8 hours. Her ankle swelled up that day, was fine the next day. Still sore today but not swollen.  Pain:      3/10 when she walks   @eval: Aury Wallis is a 53 year old female who presents to therapy today with complaints of L ankle pain s/p bimalleolar fracture 12 wks ago. She was initially casted, and then put in boot 6-12-24, instructed to wean from boot starting ~7-1-24, which she has done. She states she still wears the boot for walking longer periods, which she does 2-3x/week. \"It basically feels like I\"m walking on a sprained ankle.\" Takes ibuprofen daily PRN. Reports constant N/T between 1st-2nd digits.  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   Current functional limitations include cautious c stairs, not sleeping on 3rd level to avoid stairs during night from bedroom to bathroom, intermittent wearing of boot c walking.   Aury describes prior level of function gardening, being able to walk freely without pain or limitation, able to sleep in her bedroom on 3rd level. Pt goals include return to walking for exercise 45min bouts, easier stairs, resolve pain.  Past medical history was reviewed with Aury. Significant findings include major depressive disorder, recurrent episode, moderate anxious distress; hypercholesterolemia, mixed  hyperlipidemia, obesity, CKD, hx of breast CA, bilateral hearing aid, circadian rhythm sleep disorder shift work    Objective:     Imagin24: XR ANKLE (MIN 3 VIEWS), LEFT   FINDINGS:  There is redemonstration of nondisplaced medial and lateral malleolar fractures.  There is persistent lucency along the lateral malleolar fracture, which demonstrates mild interval sclerosis at the fracture margin.  The medial malleolar fracture line is less conspicuous on the current exam.  Continued follow-up is recommended.  Otherwise no significant change.     Treatment:  (\"NP\" indicates Not Performed this date)  Manual Therapy:  AP L TC jt gr 2-3  PROM L ankle    Neuromuscular Re-education:    Therapeutic Exercise: Eval 7-15-24   7-22-24    7-24-24   7-29-24   Recumbent bike for CKC strength and ankle ROM   5min 5min 5min   Long sit L calf stretch c strap 10\"x3       Standing L gastroc stretch  20\"x3 20\"x3 20\"x3   L ankle alphabet x1  x1     L ankle inversion vs TB Rx10  R2x10 R2x10 Rx20   L ankle eversion vs TB Rx10 R2x10  R2x10 Rx20   L ankle DF vs TB    Rx20   Seated CKC arch lifts 5\"x5  5\"x10 5\"x20 5\"x20   Tandem walking 30\"  1'x2     Tandem balance  1'ea  1'ea 1'ea    B HR painfree range    1x10 1x10 2x10    1/2 FR balance    2' 2' 2'   Stagger c 1LE on 6\" step in front c alt UE TB pull  x20ea 1'ea    Alt fwd tap 6\"  ' 1'    March on airex   1' 2'   L knee taps to wall for ankle DF   2x10 2x10   4\" lateral dips   L2x10 L2x10   Seated B HR for MTP extension   x20 x20   Seated self-stretch MTP extens   10\"x3 10\"x3   sidestepping    2'                      HEP: Access Code: [email or web address] ; URL: [email or web address] ; Prepared by: Marilou Guzman  upDate: 2024  - Seated Ankle Alphabet  - 2 x daily - 1 reps  - Seated Ankle Inversion with Resistance  - 2 x daily - 20 reps  - Seated Ankle Eversion with Resistance  - 2 x daily - 20 reps  - Seated Arch Lifts  - 2 x daily - 2 sets - 10 reps - 5 hold  -  Tandem Walking with Counter Support  - 2 x daily - 2 reps - 30 seconds  - Seated Heel Raise  - 1 x daily - 2 sets - 10 reps  - Heel Raises with Counter Support  - 1 x daily - 1-2 sets - 10 reps  - Gastroc Stretch on Wall  - 2 x daily - 3 reps - 20 hold  - Seated Plantar Fascia Stretch  - 2 x daily - 5 reps - 10 hold    Assessment/Plan: Pt reports incr pain and swelling after several hours of yardwork over weekend, but recovered quickly, only mild pain next day. She tolerated new ex's today to promote incr LLE proprioception and strength s c/o. Progress note next visit.    PLAN OF CARE:    Goals: (to be met in 8 visits)  Consistently decr pain < or = 1/10 intermittent for incr QOL and activity tolerance  Overall incr in function as indicated by incr LEFS at least 10 pts  Painfree full AROM L ankle comparable c uninvolved side within 5 degrees to promote improved gait and stairs  Painfree full MMT L ankle and incr functional strength for SL heel raise at least 10 reps minimal to no pain to promote incr walking tolerance  Pt able to ascend/descend stairs reciprocally c good form and control, painfree to allow pt to safetly go up to bedroom to sleep--met for >past week 7-29-24  Pt able to ambulate > or = 20 min s boot c minimal to no pain to progress walking tolerance for exercise--  Pt able to SLS on L for at least 10 sec c minimal to no pain to decr fall risk  Indep HEP to promote cont progress toward functional goals    Frequency / Duration: Patient will be seen for 1-2 x/week or a total of 8 visits over a 90 day period.     All Treatments performed at distinct and separate times during the therapy session.  Treatment Minutes  Units charged   Manual Therapy 8 minutes 1   Therapeutic Activity 0 minutes    Neuromuscular Re-education 0 minutes    Therapeutic Exercise 31 minutes 2   Total Direct Treatment Time 40 minutes

## 2024-07-29 ENCOUNTER — OFFICE VISIT (OUTPATIENT)
Dept: PHYSICAL THERAPY | Age: 54
End: 2024-07-29
Attending: PHYSICIAN ASSISTANT
Payer: COMMERCIAL

## 2024-07-29 DIAGNOSIS — M25.572 ACUTE LEFT ANKLE PAIN: ICD-10-CM

## 2024-07-29 DIAGNOSIS — R26.2 DIFFICULTY WALKING: ICD-10-CM

## 2024-07-29 DIAGNOSIS — S82.845D CLOSED NONDISPLACED BIMALLEOLAR FRACTURE OF LEFT ANKLE WITH ROUTINE HEALING, SUBSEQUENT ENCOUNTER: Primary | ICD-10-CM

## 2024-07-29 PROCEDURE — 97110 THERAPEUTIC EXERCISES: CPT

## 2024-07-29 PROCEDURE — 97140 MANUAL THERAPY 1/> REGIONS: CPT

## 2024-07-30 NOTE — PROGRESS NOTES
Discharge Summary  Pt has attended 5 visits in Physical Therapy.     Diagnosis:   Closed nondisplaced bimalleolar fracture of left ankle with routine healing, subsequent encounter (S82.845D), acute left ankle pain (M25.572), difficulty walking (R26.2)  Referring Provider: UNA Pretty Date of Evaluation:    7/15/2024    Precautions:  None Next MD visit:   8-7-24  Date of Surgery: n/a  Date of injury: 4-21-24 s/p fall down stairs     Insurance Primary/Secondary: CIGNA/CIGNA ENDEAVOR     Visit 5 of 8   Date POC Expires: 10-13-24       Subjective: Pt states she went for a longer walk, she starts out 1/10 discomfort, and after a few minutes increases to 3/10, but it doesn't get worse than than that. Can go 30-40min.  Pain:      0-3/10  Assessment/Plan: Pt has shown excellent progress over the past 2 wks. Her quality of gait and stairs is much improved, her walking tolerance is excellent, and ankle strength and ROM are nomalizing. Overall functioning very well, at 80% function on LEFS. She is compliant c her HEP, and I would expect her to continue to gradually progress c continued compliance. Pt is appropriate to transition to independent management c HEP at this time. HEP progressed, reviewed c pt and new handout given today. Pt in agreement c this plan and all questions answered.    @eval: Aury Wallis is a 53 year old female who presents to therapy today with complaints of L ankle pain s/p bimalleolar fracture 12 wks ago. She was initially casted, and then put in boot 6-12-24, instructed to wean from boot starting ~7-1-24, which she has done. She states she still wears the boot for walking longer periods, which she does 2-3x/week. \"It basically feels like I\"m walking on a sprained ankle.\" Takes ibuprofen daily PRN. Reports constant N/T between 1st-2nd digits.  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   Current functional limitations include  cautious c stairs, not sleeping on 3rd level to avoid stairs during night from bedroom to bathroom, intermittent wearing of boot c walking.   Aury describes prior level of function gardening, being able to walk freely without pain or limitation, able to sleep in her bedroom on 3rd level. Pt goals include return to walking for exercise 45min bouts, easier stairs, resolve pain.  Past medical history was reviewed with Aury. Significant findings include major depressive disorder, recurrent episode, moderate anxious distress; hypercholesterolemia, mixed hyperlipidemia, obesity, CKD, hx of breast CA, bilateral hearing aid, circadian rhythm sleep disorder shift work    Objective:   14 wks s/p injury 7-28-24    Observation: good coloring L foot (vs eval: mild redness L foot/ankle vs R)  Palpation: non-TTP distal fibula (vs eval: minimal TTP distal fibula)  Sensation: intact to light touch but diminished interspace 1st-2nd digits on L vs R -- no change     AROM: (* denotes performed with pain)  @eval 7-15-24 7-31-24   Ankle DF: R 5; L 0  Ankle PF: R 70; L 50  Ankle INV: R 33; L 28  Ankle EV: R 25; L 15*lat foot  L 10   L 60   L 35   L 20      Strength/MMT: (* denotes performed with pain)  @eval 7-15-24 7-31-24   Ankle DF: R 5/5; L 5/5  Ankle PF: R 5/5; L 4+/5  Ankle INV: R 5/5; L 4+*/5  Ankle EV: R 5/5; L 4+/5    L 5   L 4+   L 4+      Special tests:   Xmalleolar girth: L 24.7cm (vs eval: R 21.9cm / L 23.8cm)  B heel raise: x10 c = excursion no pain L ankle (vs eval: x5 c = excursion and moderate pain L lat ankle and forefoot)  SL heel raise: (vs eval: R x10 s issue / L unable*)     Gait: incr speed, good fluidity of gait, mild decr pushoff LLE (vs eval: pt ambulates on level ground with decr stance time LLE, decr pushoff LLE, no A.D., decr speed)  Stairs: ascends reciprocally c CGA 1 railing  painfree ; descends reciprocal c min A 1 railing and decreased eccentric quad control, painfree  (Vs eval: ascend reciprocally c min  A 1 railing to assist LLE pain L anterior ankle ; descends reciprocal c min-mod A 1 railing c poor eccentric quad control LLE, pain L ankle)     Balance:   SLS: L 4 sec (vs eval: R 10 sec, L 1* sec)     Imagin24: XR ANKLE (MIN 3 VIEWS), LEFT   FINDINGS:  There is redemonstration of nondisplaced medial and lateral malleolar fractures.  There is persistent lucency along the lateral malleolar fracture, which demonstrates mild interval sclerosis at the fracture margin.  The medial malleolar fracture line is less conspicuous on the current exam.  Continued follow-up is recommended.  Otherwise no significant change.     Treatment:  (\"NP\" indicates Not Performed this date)  Manual Therapy:  PROM L ankle    Neuromuscular Re-education:    Therapeutic Exercise: Eval 7-15-24   7-22-24    7-24-24   7-29-24   7-31-24   Recumbent bike for CKC strength and ankle ROM   5min 5min 5min 5min   Long sit L calf stretch c strap 10\"x3        Standing L gastroc stretch  20\"x3 20\"x3 20\"x3 20\"x3   L ankle alphabet x1  x1      L ankle inversion vs TB Rx10  R2x10 R2x10 Rx20    L ankle eversion vs TB Rx10 R2x10  R2x10 Rx20    L ankle DF vs TB    Rx20    Seated CKC arch lifts 5\"x5  5\"x10 5\"x20 5\"x20    Tandem walking 30\"  1'x2      Tandem balance  1'ea  1'ea 1'ea 1'ea    B HR painfree range    1x10 1x10 2x10 1x10    1/2 FR balance    2' 2' 2' 2'   Stagger c 1LE on 6\" step in front c alt UE TB pull  x20ea 1'ea     Alt fwd tap 6\"  '  on airex   ' 2 2'   L knee taps to wall for ankle DF   2x10 2x10 2x10   4\" lateral dips   L2x10 L2x10 L2x10   Seated B HR for MTP extension   x20 x20    Seated self-stretch MTP extens   10\"x3 10\"x3    sidestepping    2'    L SLS     10\"x2   HEP review/revise/reissue     15'   reassessment     10'        HEP: Access Code: [email or web address] ; URL: [email or web address] ; Prepared by: Marilou Guzman  upDate: 2024  - Seated Ankle Alphabet  - 1 x daily - 1 reps  - Seated Arch Lifts  -  1 x daily - 2 sets - 10 reps - 5 hold  - Seated Heel Raise  - 1 x daily - 2 sets - 10 reps  - Heel Raises with Counter Support  - 1 x daily - 1-2 sets - 10 reps  - Gastroc Stretch on Wall  - 1 x daily - 3 reps - 20 hold  - Seated Plantar Fascia Stretch  - 2 x daily - 5 reps - 10 hold  - Standing Ankle Dorsiflexion Stretch  - 1 x daily - 10 reps - 5 hold  - Single Leg Stance with Support  - 1 x daily - 10 reps - 10 hold  - Single Leg Heel Raise with Unilateral Counter Support  - 3-4 x weekly - 1-2 sets - 10 reps  - Side Step Down with Counter Support  - 3-4 x weekly - 1-2 sets - 10 reps    PLAN OF CARE:    Goals: (to be met in 8 visits)  Consistently decr pain < or = 1/10 intermittent for incr QOL and activity tolerance--progressing 7-31-24  Overall incr in function as indicated by incr LEFS at least 10 pts--met 7-31-24  Painfree full AROM L ankle comparable c uninvolved side within 5 degrees to promote improved gait and stairs--met 3 of 4 measures 7-31-24  Painfree full MMT L ankle and incr functional strength for SL heel raise at least 10 reps minimal to no pain to promote incr walking tolerance--progressing 7-31-24  Pt able to ascend/descend stairs reciprocally c good form and control, painfree to allow pt to safetly go up to bedroom to sleep--met for >past week 7-29-24  Pt able to ambulate > or = 20 min s boot c minimal to no pain to progress walking tolerance for exercise--met 7-31-24  Pt able to SLS on L for at least 10 sec c minimal to no pain to decr fall risk--progressing 7-31-24  Indep HEP to promote cont progress toward functional goals    All Treatments performed at distinct and separate times during the therapy session.  Treatment Minutes  Units charged   Manual Therapy 0 minutes    Therapeutic Activity 0 minutes    Neuromuscular Re-education 0 minutes    Therapeutic Exercise 45 minutes 3   Total Direct Treatment Time 45 minutes      Post LEFS Score  Post LEFS Score: 82.5 % (7/31/2024  6:27 PM)    21.25  % improvement    Plan: d /c PT to indep HEP    Patient/Family/Caregiver was advised of these findings, precautions, and treatment options and has agreed to actively participate in planning and for this course of care.    Thank you for your referral. If you have any questions, please contact me at Dept: [phone].    Sincerely,  Electronically signed by therapist: Marilou Guzman PT     Physician's certification required:  Yes  Please co-sign or sign and return this letter via fax as soon as possible to [phone].   I certify the need for these services furnished under this plan of treatment and while under my care.    X___________________________________________________ Date____________________    Certification From: 7/31/2024  To:10/29/2024

## 2024-07-31 ENCOUNTER — OFFICE VISIT (OUTPATIENT)
Dept: PHYSICAL THERAPY | Age: 54
End: 2024-07-31
Attending: PHYSICIAN ASSISTANT
Payer: COMMERCIAL

## 2024-07-31 DIAGNOSIS — M25.572 ACUTE LEFT ANKLE PAIN: ICD-10-CM

## 2024-07-31 DIAGNOSIS — S82.845D CLOSED NONDISPLACED BIMALLEOLAR FRACTURE OF LEFT ANKLE WITH ROUTINE HEALING, SUBSEQUENT ENCOUNTER: Primary | ICD-10-CM

## 2024-07-31 DIAGNOSIS — R26.2 DIFFICULTY WALKING: ICD-10-CM

## 2024-07-31 PROCEDURE — 97110 THERAPEUTIC EXERCISES: CPT

## 2024-08-06 ENCOUNTER — TELEPHONE (OUTPATIENT)
Dept: ORTHOPEDICS CLINIC | Facility: CLINIC | Age: 54
End: 2024-08-06

## 2024-08-06 DIAGNOSIS — S82.842D CLOSED BIMALLEOLAR FRACTURE OF LEFT ANKLE WITH ROUTINE HEALING, SUBSEQUENT ENCOUNTER: Primary | ICD-10-CM

## 2024-08-07 ENCOUNTER — HOSPITAL ENCOUNTER (OUTPATIENT)
Dept: GENERAL RADIOLOGY | Age: 54
Discharge: HOME | End: 2024-08-07
Attending: PHYSICIAN ASSISTANT
Payer: COMMERCIAL

## 2024-08-07 ENCOUNTER — OFFICE VISIT (OUTPATIENT)
Dept: ORTHOPEDICS CLINIC | Facility: CLINIC | Age: 54
End: 2024-08-07
Payer: COMMERCIAL

## 2024-08-07 VITALS — BODY MASS INDEX: 29.44 KG/M2 | HEIGHT: 62 IN | WEIGHT: 160 LBS

## 2024-08-07 DIAGNOSIS — S82.842D CLOSED BIMALLEOLAR FRACTURE OF LEFT ANKLE WITH ROUTINE HEALING, SUBSEQUENT ENCOUNTER: Primary | ICD-10-CM

## 2024-08-07 DIAGNOSIS — S82.842D CLOSED BIMALLEOLAR FRACTURE OF LEFT ANKLE WITH ROUTINE HEALING, SUBSEQUENT ENCOUNTER: ICD-10-CM

## 2024-08-07 PROCEDURE — 73610 X-RAY EXAM OF ANKLE: CPT | Performed by: PHYSICIAN ASSISTANT

## 2024-08-07 PROCEDURE — 99213 OFFICE O/P EST LOW 20 MIN: CPT | Performed by: PHYSICIAN ASSISTANT

## 2024-08-07 NOTE — PROGRESS NOTES
[facility] - ORTHOPEDICS  [address] [place] [address]  [phone]       Name: Aury Wallis   MRN: [id number]  Date: 8/7/2024     REASON FOR VISIT: Follow up for  left nondisplaced bimalleolar ankle fracture (distal fibula and medial malleolus) sustained April 21, 2024 after a fall down the stairs.     INTERVAL HISTORY:  Aury Wallis is a 53 year old female who returns for evaluation of  left nondisplaced bimalleolar ankle fracture (distal fibula and medial malleolus) sustained April 21, 2024 after a fall down the stairs.  Overall doing well.  Minimal pain.  2 out of 10 pain.    ROS: ROS    PE:   Vitals:    08/07/24 1151   Weight: 160 lb (72.6 kg)   Height: 5' 2\" (1.575 m)     Estimated body mass index is 29.26 kg/m² as calculated from the following:    Height as of this encounter: 5' 2\" (1.575 m).    Weight as of this encounter: 160 lb (72.6 kg).    Physical Exam  Constitutional:       Appearance: Normal appearance.   HENT:      Head: Normocephalic and atraumatic.   Eyes:      Extraocular Movements: Extraocular movements intact.   Neck:      Musculoskeletal: Normal range of motion and neck supple.   Cardiovascular:      Pulses: Normal pulses.   Pulmonary:      Effort: Pulmonary effort is normal. No respiratory distress.   Abdominal:      General: There is no distension.   Skin:     General: Skin is warm.      Capillary Refill: Capillary refill takes less than 2 seconds.      Findings: No bruising.   Neurological:      General: No focal deficit present.      Mental Status: She is alert.   Psychiatric:         Mood and Affect: Mood normal.     Examination of the left foot/ankle demonstrates:     Skin is intact, warm and dry.     Inspection:   Atrophy: none    Effusion: none    Edema: none    Erythema: none    Hematoma: none      Palpation:   Anterior Talo-Fibular Ligament (ATFL): none    Fibular Ligament (PTFL): none    Calcaneo-Fibular Ligament (CFL): none    Achilles  Tendon: none    Peroneal Tendon: none    Posterior Tib Tendon: none    Talar dome: none    Metatarsal bones: none    Joint line tenderness: none  Crepitation: none     ROM:   Dorsiflexion: 10 degrees.  Plantarflexion: 40 degrees.  Eversion:  20 degrees  Inversion: 30 degrees.    Strength: normal     Special Tests:   Anterior Drawer Test ATFL Rupture: Negative  CFL Forced Inversion: Negative   Talar Dome:  Negative   Gait:  normal   Leg length: equal and symmetric  Alignment:  neutral     No obvious peripheral edema noted.   Distal neurovascular exam demonstrates normal perfusion, intact sensation to light touch and full strength.       Radiographic Examination/Diagnostics:    I personally viewed, independently interpreted and radiology report was reviewed.    XR ANKLE WEIGHTBEARING (3 VIEWS), LEFT (CPT=73610)    Result Date: 8/7/2024  PROCEDURE:  XR ANKLE WEIGHTBEARING (3 VIEWS), LEFT (CPT=73610)  TECHNIQUE:  Three views were obtained.  COMPARISON:  [place], XR, XR ANKLE (MIN 3 VIEWS), LEFT (CPT=73610), 6/12/2024, 9:48 AM.  INDICATIONS:  S82.842D Closed bimalleolar fracture of left ankle with routine healing, subsequent encounter  PATIENT STATED HISTORY: (As transcribed by Technologist)  Patient is having ortho follow-up. She fractured her left ankle 4/21/24.              CONCLUSION:    Redemonstration fracture medial and lateral malleolus.  The medial malleolar fracture appears less well visualized than on the prior, and the lateral malleolar fracture appears slightly less well visualized.  No new fractures seen.  No disruption ankle mortise.  Continued mild tissue swelling.  Otherwise stable appearance of the ankle. Continued clinical and x-ray follow-up advised.  LOCATION:  [facility]   Dictated by (CST): Noé Ramos MD on 8/07/2024 at 11:46 AM     Finalized by (CST): Noé Ramos MD on 8/07/2024 at 11:48 AM         IMPRESSION: Aury Wallis is a 53 year old female who presented for follow up of  left  nondisplaced bimalleolar ankle fracture (distal fibula and medial malleolus) sustained April 21, 2024 after a fall down the stairs.     PLAN:   We had a detailed discussion outlining the etiology, anatomy, pathophysiology, and natural history of the patient's findings.    The patient notes near complete resolution of symptoms, and return to full baseline function. The patient can follow up with our office as needed. The patient had the opportunity to ask questions, and all questions were answered appropriately.       FOLLOW-UP:  Return to clinic on an as needed basis.             David Lance, [facility], PA-C Orthopedic Surgery / Sports Medicine Specialist  [facility]  [address] [place] [address].org  [email or web address]  t: [phone]  o: [phone]  f: [phone]    This note was dictated using Dragon software.  While it was briefly proofread prior to completion, some grammatical, spelling, and word choice errors due to dictation may still occur.

## 2024-09-27 ENCOUNTER — TELEPHONE (OUTPATIENT)
Dept: FAMILY MEDICINE CLINIC | Facility: CLINIC | Age: 54
End: 2024-09-27

## 2024-09-27 NOTE — TELEPHONE ENCOUNTER
Patient would like to be get clarification, states she assumed  Dr. Radhika Cantrell had blood work orders for her to do at [facility]. No pending orders from Dr. Radhika Cantrell.     Please advise

## 2024-09-27 NOTE — TELEPHONE ENCOUNTER
Spoke with patient. Patient thought she had labs that needed to be completed. Patient while on phone stated she also had a call into Dr Alejo's office. Informed her that there is an order from Dr Alejo for CMP. Patient stated she was mixed up with who she had lab work to get completed for. Patient awaiting call from Dr Alejo's office.

## 2024-11-15 ENCOUNTER — ORDER TRANSCRIPTION (OUTPATIENT)
Dept: ADMINISTRATIVE | Facility: HOSPITAL | Age: 54
End: 2024-11-15

## 2024-11-15 DIAGNOSIS — Z12.31 ENCOUNTER FOR SCREENING MAMMOGRAM FOR MALIGNANT NEOPLASM OF BREAST: Primary | ICD-10-CM

## 2024-11-15 DIAGNOSIS — Z85.3 HISTORY OF CANCER OF LEFT BREAST: ICD-10-CM

## 2024-11-25 ENCOUNTER — TELEPHONE (OUTPATIENT)
Dept: FAMILY MEDICINE CLINIC | Facility: CLINIC | Age: 54
End: 2024-11-25

## 2024-11-25 DIAGNOSIS — Z12.31 ENCOUNTER FOR SCREENING MAMMOGRAM FOR MALIGNANT NEOPLASM OF BREAST: Primary | ICD-10-CM

## 2024-11-25 NOTE — TELEPHONE ENCOUNTER
Scheduling department called requesting Mammogram order for patient. Please send orders to [facility].   Please call patient once orders are in, so she can schedule mammogram appointment.     Informed patient to allow up to 24 to 48 Business hours for a call back with a status.

## 2024-12-30 ENCOUNTER — HOSPITAL ENCOUNTER (OUTPATIENT)
Dept: MAMMOGRAPHY | Age: 54
Discharge: HOME | End: 2024-12-30
Attending: FAMILY MEDICINE
Payer: COMMERCIAL

## 2024-12-30 DIAGNOSIS — Z12.31 ENCOUNTER FOR SCREENING MAMMOGRAM FOR MALIGNANT NEOPLASM OF BREAST: ICD-10-CM

## 2024-12-30 PROCEDURE — 77063 BREAST TOMOSYNTHESIS BI: CPT | Performed by: FAMILY MEDICINE

## 2024-12-30 PROCEDURE — 77067 SCR MAMMO BI INCL CAD: CPT | Performed by: FAMILY MEDICINE

## 2025-05-16 ENCOUNTER — OFFICE VISIT (OUTPATIENT)
Facility: CLINIC | Age: 55
End: 2025-05-16
Payer: COMMERCIAL

## 2025-05-16 VITALS
BODY MASS INDEX: 31.83 KG/M2 | RESPIRATION RATE: 16 BRPM | OXYGEN SATURATION: 96 % | WEIGHT: 173 LBS | HEIGHT: 62 IN | DIASTOLIC BLOOD PRESSURE: 70 MMHG | HEART RATE: 80 BPM | SYSTOLIC BLOOD PRESSURE: 100 MMHG

## 2025-05-16 DIAGNOSIS — E66.811 CLASS 1 OBESITY WITH SERIOUS COMORBIDITY AND BODY MASS INDEX (BMI) OF 31.0 TO 31.9 IN ADULT, UNSPECIFIED OBESITY TYPE: ICD-10-CM

## 2025-05-16 DIAGNOSIS — E78.00 HYPERCHOLESTEROLEMIA: ICD-10-CM

## 2025-05-16 DIAGNOSIS — Z51.81 ENCOUNTER FOR THERAPEUTIC DRUG LEVEL MONITORING: Primary | ICD-10-CM

## 2025-05-16 DIAGNOSIS — F41.9 ANXIETY AND DEPRESSION: ICD-10-CM

## 2025-05-16 DIAGNOSIS — K75.81 NASH (NONALCOHOLIC STEATOHEPATITIS): ICD-10-CM

## 2025-05-16 DIAGNOSIS — F32.A ANXIETY AND DEPRESSION: ICD-10-CM

## 2025-05-16 DIAGNOSIS — R73.01 IFG (IMPAIRED FASTING GLUCOSE): ICD-10-CM

## 2025-05-16 PROCEDURE — 99214 OFFICE O/P EST MOD 30 MIN: CPT | Performed by: PHYSICIAN ASSISTANT

## 2025-05-16 RX ORDER — TOPIRAMATE 25 MG/1
25 TABLET, FILM COATED ORAL 2 TIMES DAILY
Qty: 180 TABLET | Refills: 0 | Status: SHIPPED | OUTPATIENT
Start: 2025-05-16

## 2025-05-16 RX ORDER — METFORMIN HYDROCHLORIDE 750 MG/1
750 TABLET, EXTENDED RELEASE ORAL DAILY
Qty: 90 TABLET | Refills: 0 | Status: SHIPPED | OUTPATIENT
Start: 2025-05-16 | End: 2025-05-16 | Stop reason: CLARIF

## 2025-05-16 RX ORDER — METFORMIN HYDROCHLORIDE 750 MG/1
1500 TABLET, EXTENDED RELEASE ORAL DAILY
Qty: 180 TABLET | Refills: 0 | Status: SHIPPED | OUTPATIENT
Start: 2025-05-16

## 2025-05-16 NOTE — PROGRESS NOTES
HISTORY OF PRESENT ILLNESS  Chief Complaint   Patient presents with    Weight Check     +13lbs       Aury Wallis is a 54 year old female here for follow up in medical weight loss program.   Last OV 7/26/24  +13lbs  Denies chest pain, shortness of breath, dizziness, blurred vision, headache, paresthesia, nausea/vomiting.   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 Follow Up    General Information  Nutrition Recall  Exercise     Sleep              Wt Readings from Last 6 Encounters:   05/16/25 173 lb (78.5 kg)   11/14/24 168 lb 3.2 oz (76.3 kg)   10/01/24 167 lb 3.2 oz (75.8 kg)   08/07/24 160 lb (72.6 kg)   07/26/24 160 lb (72.6 kg)   06/17/24 168 lb (76.2 kg)            Breakfast Lunch Dinner Snacks Fluids   Reviewed           REVIEW OF SYSTEMS  GENERAL HEALTH: feels well otherwise, denied any fevers chills or night sweats   RESPIRATORY: denies shortness of breath   CARDIOVASCULAR: denies chest pain  GI: denies abdominal pain    EXAM  /70   Pulse 80   Resp 16   Ht 5' 2\" (1.575 m)   Wt 173 lb (78.5 kg)   LMP 10/25/2021 (Approximate)   SpO2 96%   BMI 31.64 kg/m²   GENERAL: well developed, well nourished,in no apparent distress, A/O x3  SKIN: no rashes,no suspicious lesions  HEENT: atraumatic, normocephalic, OP-clear, PERRL  NECK: supple,no adenopathy  LUNGS: clear to auscultation bilaterally   CARDIO: RRR without murmur  GI: good BS's,NT/ND, no masses or HSM  EXTREMITIES: no cyanosis, no clubbing, no edema    Lab Results   Component Value Date    WBC 5.5 01/03/2025    RBC 4.39 01/03/2025    HGB  13.7 01/03/2025    HCT 40.6 01/03/2025    MCV 92.5 01/03/2025    MCH 31.2 01/03/2025    MCHC 33.7 01/03/2025    RDW 12.4 01/03/2025     01/03/2025     Lab Results   Component Value Date     (H) 01/03/2025    BUN 18 01/03/2025    BUNCREA 16 01/03/2025    CREATSERUM 1.13 (H) 01/03/2025    ANIONGAP 6 01/18/2024    GFR 87 09/08/2017    GFRNAA 83 05/16/2022    GFRAA 96 05/16/2022    CA 9.5 01/03/2025    OSMOCALC 294 01/18/2024    ALKPHO 90 01/03/2025    AST 20 01/03/2025    ALT 26 01/03/2025    BILT 0.7 01/03/2025    TP 7.1 01/03/2025    ALB 4.5 01/03/2025    GLOBULIN 2.6 01/03/2025    AGRATIO 1.7 01/03/2025     01/03/2025    K 4.3 01/03/2025     01/03/2025    CO2 24 01/03/2025     No results found for: \"EAG\", \"A1C\"  Lab Results   Component Value Date    CHOLEST 188 01/18/2024    TRIG 153 (H) 01/18/2024    HDL 40 01/18/2024     (H) 01/18/2024    VLDL 27 01/18/2024    TCHDLRATIO 3.9 05/16/2022    NONHDLC 148 (H) 01/18/2024     Lab Results   Component Value Date    T4F 1.0 01/18/2024    TSH 2.140 01/18/2024     Lab Results   Component Value Date    B12 518 12/08/2020     Lab Results   Component Value Date    VITD 48.5 12/08/2020       Medications Ordered Prior to Encounter[1]    ASSESSMENT  Analyzed weight data:       Diagnoses and all orders for this visit:    Encounter for therapeutic drug level monitoring  -     Discontinue: metFORMIN  MG Oral Tablet 24 Hr; Take 1 tablet (750 mg total) by mouth daily.  -     topiramate 25 MG Oral Tab; Take 1 tablet (25 mg total) by mouth 2 (two) times daily.    Class 1 obesity with serious comorbidity and body mass index (BMI) of 31.0 to 31.9 in adult, unspecified obesity type  -     Discontinue: metFORMIN  MG Oral Tablet 24 Hr; Take 1 tablet (750 mg total) by mouth daily.  -     topiramate 25 MG Oral Tab; Take 1 tablet (25 mg total) by mouth 2 (two) times daily.    IFG (impaired fasting glucose)  -     Discontinue: metFORMIN  MG Oral  Tablet 24 Hr; Take 1 tablet (750 mg total) by mouth daily.  -     topiramate 25 MG Oral Tab; Take 1 tablet (25 mg total) by mouth 2 (two) times daily.    SOTO (nonalcoholic steatohepatitis)  -     Discontinue: metFORMIN  MG Oral Tablet 24 Hr; Take 1 tablet (750 mg total) by mouth daily.  -     topiramate 25 MG Oral Tab; Take 1 tablet (25 mg total) by mouth 2 (two) times daily.    Hypercholesterolemia  -     Discontinue: metFORMIN  MG Oral Tablet 24 Hr; Take 1 tablet (750 mg total) by mouth daily.  -     topiramate 25 MG Oral Tab; Take 1 tablet (25 mg total) by mouth 2 (two) times daily.    Anxiety and depression  -     Discontinue: metFORMIN  MG Oral Tablet 24 Hr; Take 1 tablet (750 mg total) by mouth daily.  -     topiramate 25 MG Oral Tab; Take 1 tablet (25 mg total) by mouth 2 (two) times daily.    Other orders  -     metFORMIN  MG Oral Tablet 24 Hr; Take 2 tablets (1,500 mg total) by mouth daily.        PLAN  Initial Weight: 182lbs  Initial Weight Date: 4/26/23  Today's Weight: 160lbs  5% Goal: 9.1lbs  10% Goal: 18.2lbs  Total Weight Loss: Down 16lb/Net loss 22lbs    Will restart metformin and begin topamax - discussed MOA, advised side effects and adverse effects of medication, denies any h/o kidney stones or glaucoma  IFG - begin metformin, low carb diet  SOTO - low carb, low fat diet  HLD - lifestyle changes  Mood is stable on current medications, continue to work on stress management  Consistency with logging foods - protein and produce  Incorporate strength/resistance training  Nutrition: low carb diet/ recommended to eat breakfast daily/ regular protein intake  Medication use and side effects reviewed with patient.  Medication contraindications: caution stimulant - anxiety  Follow up with dietitian and psychologist as recommended.  Discussed the role of sleep and stress in weight management.  Counseled on comprehensive weight loss plan including attention to nutrition, exercise and  behavior/stress management for success. See patient instruction below for more details.  Discussed strategies to overcome barriers to successful weight loss and weight maintenance  FITTE: ACSM recommendations (150-300 minutes/ week in active weight loss)   Weight Loss consent to treat reviewed and signed       NOTE TO PATIENT: The 21st Century Cures Act makes clinical notes like these available to patients in the interest of transparency. Clinical notes are medical documents used by physicians and care providers to communicate with each other. These documents include medical language and terminology, abbreviations, and treatment information that may sound technical and at times possibly unfamiliar. In addition, at times, the verbiage may appear blunt or direct. These documents are one tool providers use to communicate relevant information and clinical opinions of the care providers in a way that allows common understanding of the clinical context.     There are no Patient Instructions on file for this visit.    No follow-ups on file.    Patient verbalizes understanding.    Noemí Moreno PA-C  5/16/2025           [1]   Current Outpatient Medications on File Prior to Visit   Medication Sig Dispense Refill    ALPRAZolam 0.5 MG Oral Tab Take 1-2 tablets (0.5-1 mg total) by mouth daily as needed for Anxiety. 30 tablet 1    buPROPion  MG Oral Tablet 24 Hr Take 1 tablet (150 mg total) by mouth every morning. 90 tablet 0    escitalopram 10 MG Oral Tab Take 1 tablet (10 mg total) by mouth daily. 90 tablet 0    Calcium-Magnesium-Vitamin D (CALCIUM MAGNESIUM OR) Take by mouth. Liquid      Ascorbic Acid (VITAMIN C) 100 MG Oral Tab Take 1 tablet (100 mg total) by mouth daily.      cholecalciferol 125 MCG (5000 UT) Oral Tab Take 1 tablet (5,000 Units total) by mouth. 2 tab daily Mon-Friday to =10,000      Nutritional Supplements (ANTIOXIDANTS OR) Take 1 tablet by mouth daily. Take as directed      ZEPBOUND 7.5 MG/0.5ML  Subcutaneous Solution Auto-injector Inject 7.5 mg into the skin once a week. (Patient not taking: Reported on 5/16/2025)       No current facility-administered medications on file prior to visit.

## 2025-08-12 DIAGNOSIS — R73.01 IFG (IMPAIRED FASTING GLUCOSE): ICD-10-CM

## 2025-08-12 DIAGNOSIS — E66.811 CLASS 1 OBESITY WITH SERIOUS COMORBIDITY AND BODY MASS INDEX (BMI) OF 31.0 TO 31.9 IN ADULT, UNSPECIFIED OBESITY TYPE: ICD-10-CM

## 2025-08-12 DIAGNOSIS — Z51.81 ENCOUNTER FOR THERAPEUTIC DRUG LEVEL MONITORING: ICD-10-CM

## 2025-08-12 DIAGNOSIS — F32.A ANXIETY AND DEPRESSION: ICD-10-CM

## 2025-08-12 DIAGNOSIS — E78.00 HYPERCHOLESTEROLEMIA: ICD-10-CM

## 2025-08-12 DIAGNOSIS — E66.811 OBESITY, CLASS 1: ICD-10-CM

## 2025-08-12 DIAGNOSIS — K75.81 NASH (NONALCOHOLIC STEATOHEPATITIS): ICD-10-CM

## 2025-08-12 DIAGNOSIS — F41.9 ANXIETY AND DEPRESSION: ICD-10-CM

## 2025-08-13 RX ORDER — METFORMIN HYDROCHLORIDE 750 MG/1
750 TABLET, EXTENDED RELEASE ORAL DAILY
Qty: 90 TABLET | Refills: 0 | Status: SHIPPED | OUTPATIENT
Start: 2025-08-13

## 2025-08-13 RX ORDER — TOPIRAMATE 25 MG/1
25 TABLET, FILM COATED ORAL 2 TIMES DAILY
Qty: 180 TABLET | Refills: 0 | Status: SHIPPED | OUTPATIENT
Start: 2025-08-13

## (undated) DIAGNOSIS — Z51.81 THERAPEUTIC DRUG MONITORING: ICD-10-CM

## (undated) DIAGNOSIS — F33.1 MAJOR DEPRESSIVE DISORDER, RECURRENT EPISODE, MODERATE WITH ANXIOUS DISTRESS (HCC): ICD-10-CM

## (undated) DEVICE — TUBING CYSTO

## (undated) DEVICE — SUTURE VICRYL 0 UR-6

## (undated) DEVICE — SOL  .9 1000ML BTL

## (undated) DEVICE — CHLORAPREP 26ML APPLICATOR

## (undated) DEVICE — KENDALL SCD EXPRESS SLEEVES, KNEE LENGTH, MEDIUM: Brand: KENDALL SCD

## (undated) DEVICE — MONOFILAMENT ABSORBABLE SUTURE: Brand: MAXON

## (undated) DEVICE — GYN CDS: Brand: MEDLINE INDUSTRIES, INC.

## (undated) DEVICE — CAUTERY PENCIL

## (undated) DEVICE — TROCAR: Brand: KII® SLEEVE

## (undated) DEVICE — LAP CHOLE/APPY CDS-LF: Brand: MEDLINE INDUSTRIES, INC.

## (undated) DEVICE — SUTURE POLYESTER 1 CT-1

## (undated) DEVICE — STERILE SYNTHETIC POLYISOPRENE POWDER-FREE SURGICAL GLOVES WITH HYDROGEL COATING: Brand: PROTEXIS

## (undated) DEVICE — VIOLET BRAIDED (POLYGLACTIN 910), SYNTHETIC ABSORBABLE SUTURE: Brand: COATED VICRYL

## (undated) DEVICE — Device

## (undated) DEVICE — LIGAMAX 5 MM ENDOSCOPIC MULTIPLE CLIP APPLIER: Brand: LIGAMAX

## (undated) DEVICE — TROCARS: Brand: KII® BALLOON BLUNT TIP SYSTEM

## (undated) DEVICE — SUTURE VICRYL 5-0 PC-1

## (undated) DEVICE — NITINOL WIRE STR 035

## (undated) DEVICE — SUTURE VICRYL 4-0 PC-1

## (undated) DEVICE — SOL  .9 3000ML

## (undated) DEVICE — SUTURE VICRYL 3-0 CT-2

## (undated) DEVICE — DRAPE C-ARM UNIVERSAL

## (undated) DEVICE — TROCAR: Brand: KII SHIELDED BLADED ACCESS SYSTEM

## (undated) DEVICE — STERILE POLYISOPRENE POWDER-FREE SURGICAL GLOVES: Brand: PROTEXIS

## (undated) DEVICE — GAUZE SPONGES,USP TYPE VII GAUZE, 12 PLY: Brand: CURITY

## (undated) DEVICE — TIGERTAIL 5F FLXTIP 70CM

## (undated) NOTE — Clinical Note
04/05/2017        Nelly Tipton  [address]  [address]      Dear Eva Pedro,    Our records indicate that you have outstanding lab work and or testing that was ordered for you and has not yet been completed:      LIPID PANEL  TSH

## (undated) NOTE — LETTER
[facility] Testing Department  Phone: [phone]  Right Fax: [phone]  [facility] By: JOSE Camp RN Date: 3/23/20    Patient Name: Batsheva Bourne  Surgery Date: 3/31/2020    CSN: [id number]  Medical Record: [id number]   DO

## (undated) NOTE — LETTER
21    Patient: Vianney Wyatt  : 10/16/1970 Visit date: 2021    Dear  Jon Carter DO    Thank you for referring Vianney Wyatt to my practice. Please find my assessment and plan below.        Assessment   Encounter for screening colonos

## (undated) NOTE — LETTER
Date: 4/24/2024    Patient Name: Aury Wallis          To Whom it may concern:    This letter has been written at the patient's request. The above patient was seen at [facility] for treatment of a medical condition.    May be excuses from work due to ankle fracture. Will be re-evaluated in 2 weeks with repeat XR.       Sincerely,        Shauna Rhodes MD  Knee, Shoulder, & Elbow Surgery / Sports Medicine Specialist  Orthopaedic Surgery  [address] [place] [address].[facility]  [email or web address]  t: [phone]  o: [phone]  f: [phone]

## (undated) NOTE — LETTER
11/15/24    RE: Aury Wallis  : 10/16/1970    Dear Radhika Cantrell, DO    This patient has scheduled a self-referred/self-requested screening mammogram appointment with [facility]. This patient has identified you as their physician. If this is not correct, please contact the radiology administration office at   [phone] and the patient will be assigned to another physician. You will receive the results of this exam after it is completed.

## (undated) NOTE — LETTER
18    Patient: Ellen Chamberlain  : 10/16/1970 Visit date: 4/3/2018    Dear  Dr. Perry Garner,    Thank you for referring Ellen Chamberlain to my practice. Please find my assessment and plan below.              Assessment   Calculus of gallbladder with longer allow anybody to place benzoin, or other adhesive agents. She should also not get any future Steri-Strips. We are adding adhesive tape to her allergy list.    Despite this she is doing extremely well.     I have no further follow-up scheduled wit

## (undated) NOTE — ED AVS SNAPSHOT
Deanna Thomas   MRN: [id number]    Department:  [facility] Emergency Department   Date of Visit:  4/4/2018           Disclosure     Insurance plans vary and the physician(s) referred by the ER may not be covered by your plan.  Please contact your tell this physician (or your personal doctor if your instructions are to return to your personal doctor) about any new or lasting problems. The primary care or specialist physician will see patients referred from the [facility] Emergency Department.  Jose Grant

## (undated) NOTE — ED AVS SNAPSHOT
Lina Santana   MRN: [id number]    Department:  [facility] Emergency Department in [place]   Date of Visit:  9/8/2017           Disclosure     Insurance plans vary and the physician(s) referred by the ER may not be covered by your plan.  Please contact If you have been prescribed any medication(s), please fill your prescription right away and begin taking the medication(s) as directed    If the emergency physician has read X-rays, these will be re-interpreted by a radiologist.  If there is a significant

## (undated) NOTE — LETTER
18    Patient: Lynn Berry  : 10/16/1970 Visit date: 3/20/2018    Dear  Dr. Diomedes Garcia, DO,    Thank you for referring Lynn Berry to my practice. Please find my assessment and plan below.                 Assessment   Calculus of gallbladder have normal activity and normal pitch. This patient has a non-reducible umbilical region hernia that is actually an incisional hernia from her tubal ligation. The contents are 2 cm in greatest dimension. The region is nontender.   There is a small infrau

## (undated) NOTE — LETTER
Date: 6/12/2024    Patient Name: Aury Wallis          To Whom it may concern:    The above patient was seen at [facility] for treatment of a medical condition.    The patient can return to work with restrictions:     -Toe touch weightbearing (two weeks)- then weightbearing as tolerated while wearing walking boot.   -No pulling, pushing or lifting for four weeks.     Sincerely,          Rebekahr KAYLA Lance [facility], PA-C Orthopedic Surgery / Sports Medicine Specialist  [facility] [place] [place] Orthopaedic Surgery  [address] [place] [address].org  [email or web address]  t: [phone]  o: [phone]  f: [phone]    This note was dictated using Dragon software.  While it was briefly proofread prior to completion, some grammatical, spelling, and word choice errors due to dictation may still occur.

## (undated) NOTE — LETTER
Genet Villanueva, :10/16/1970    CONSENT FOR PROCEDURE/SEDATION    1. I authorize the performance upon Genet Villanueva  the following: Polypectomy    2.  I authorize DELANO La (and whomever is designated as the doctor’s assistant), to Toll Greganne-marie Witness: _________________________________________ Date:___________     Physician Signature: _______________________________ Date:___________

## (undated) NOTE — ED AVS SNAPSHOT
Jerica Winkler   MRN: [id number]    Department:  [facility] Emergency Department   Date of Visit:  3/21/2020           Disclosure     Insurance plans vary and the physician(s) referred by the ER may not be covered by your plan.  Please contact you tell this physician (or your personal doctor if your instructions are to return to your personal doctor) about any new or lasting problems. The primary care or specialist physician will see patients referred from the [facility] Emergency Department.  Kumar Hilario

## (undated) NOTE — MR AVS SNAPSHOT
[address] [facility]  Ivan Giles [facility]  [phone]               Thank you for choosing us for your health care visit with Iris Handley DO.   We are glad to serve you and happy to provide you with this s · Follow the prompts  · If you have questions, contact Central Scheduling at [phone]    [address] Scheduling at [phone]    Routine Healthcare for Women   Routine checkups can find treatable pr screening if you have had any abnormalities in the past or if you have an increased risk for cervical cancer. Cholesterol test: if you are age 39 or older. You may start having this test at an earlier age if you have a family history of high cholesterol. Vision test: if you are 72 or older. Remember, these are the minimum recommendations for routine tests. You and your healthcare provider must discuss what is right for you based on your symptoms and your personal and family medical history.    Many other sex partner or whose partner has more than 1 partner, or who have a sexually transmitted infection, abuse IV drugs, or plan to travel where hepatitis B is common. Pneumococcal pneumonia shot if you are age 72 or older.  You may need to get it at a younger use of estrogen and progesterone replacement with your healthcare provider. Osteoporosis prevention:  Advise 1,500 mg of calcium with 1,000 iu of vitamin D daily and daily weight bearing exercise.        Allergies as of Mar 06, 2017     Tegaderm Chg Dress You can access your MyChart to more actively manage your health care and view more details from this visit by going to [email or web address].   If you've recently had a stay at the Hospital you can access your discharge instructions in [address] by jaimie

## (undated) NOTE — LETTER
Date & Time: 4/21/2024, 12:34 PM  Patient: Aury Wallis  Encounter Provider(s):    Apple Owusu PA       To Whom It May Concern:    Aury Wallis was seen and treated in our department on 4/21/2024.  Patient has sustained a fracture to her left lower extremity.  Patient will return to work at the discretion of the orthopedist.    If you have any questions or concerns, please do not hesitate to call.        _____________________________  Physician/APC Signature

## (undated) NOTE — LETTER
Date & Time: 3/27/2023, 8:16 PM  Patient: Ade Heaton  Encounter Provider(s):    [email or web address]       To Whom It May Concern:    Randy Alston was seen and treated in our department on 3/27/2023. She should not return to work until 04/03/23.     If you have any questions or concerns, please do not hesitate to call.        _____________________________  Physician/APC Signature

## (undated) NOTE — MR AVS SNAPSHOT
[facility] [place] HintonI[place] epstein [facility]  [phone]               Thank you for choosing us for your health care visit with Ovi Arzate DO.   We are glad to serve you and happy to provide you with this s may get better as you walk throughout the day. But as you continue to put weight on the foot, the pain often returns. Pain may also occur after standing or sitting for long periods.   Treating plantar fasciitis  Treatments for plantar fasciitis include:  · · Symptoms that don’t get better with treatment, or get worse  · New symptoms, such as numbness, tingling, or weakness in the foot   Date Last Reviewed: 3/10/2016  © [id number] [address], [address] [place], [place] [address] [employer].  All rights silicone seem to be the most effective. Custom-made inserts can be provided by a podiatrist or foot specialist, physical therapist, or orthopedist.  · Premade or custom-made night splints keep the heel stretched out while you sleep.  They may prevent mornin First, your healthcare provider tries to determine the cause of your problem in order to suggest ways to relieve pain. If your pain is due to poor foot mechanics, custom-made shoe inserts (orthoses) may help.     Reduce symptoms  · To relieve mild symptoms, Follow Up with Our Office     Return as needed.       Your Appointments     May 08, 2017  1:45 PM   HEMATOLOGY ONCOLOGY FOLLOW UP with Tristan Uriostegui MD   [facility])    [address] [place] [address] Road  Pl Place 1 tablet under the tongue daily. Vitamin D3 1000 UNITS Tabs   Take 1,000 Units by mouth daily. Commonly known as:  VITAMIN D3           * Notice: This list has 2 medication(s) that are the same as other medications prescribed for you.  Re

## (undated) NOTE — MR AVS SNAPSHOT
[facility] [place] Fontenot [address] [place] [id number] [phone]               Thank you for choosing us for your health care visit with [facility] NURSE.   We are glad to serve you and happy to provide you with this summary Take 2 capsules by mouth daily. omega-3 fatty acids 1000 MG Caps   Take 1,000 mg by mouth daily.    Commonly known as:  FISH OIL           Tamoxifen Citrate 20 MG Tabs   TAKE 1 TABLET BY MOUTH DAILY   Commonly known as:  Demario Hernandez

## (undated) NOTE — MR AVS SNAPSHOT
After Visit Summary   5/8/2017    Annabel Jay    MRN: [id number]           Diagnoses this Visit     Primary malignant neoplasm of upper outer quadrant of left female breast ([address]    -  Primary     Elevated liver enzymes         Menorrhagia with LAB:  CBC WITH DIFFERENTIAL WITH PLATELET        Monday May 08, 2017     LAB:  COMP METABOLIC PANEL (14)        Monday May 08, 2017     LAB:  FERRITIN        Monday May 08, 2017     LAB:  IRON AND TIBC        Monday May 08, 2017     LAB:  VITAMIN D, 25-H

## (undated) NOTE — LETTER
Date: 5/8/2024    Patient Name: Aury Wallis          To Whom it may concern:    This letter has been written at the patient's request. The above patient was seen at [facility] for treatment of a medical condition and should be excused for the duration of the appointment.    This patient should be excused from attending work due to an orthopedic condition for the next 4 weeks where she will be re-evaluated.      Sincerely,          David Lance [facility], PA-C Orthopedic Surgery / Sports Medicine Specialist  [facility]  [address] [place] [address].org  [email or web address]  t: [phone]  o: [phone]  f: [phone]